# Patient Record
Sex: FEMALE | Race: BLACK OR AFRICAN AMERICAN | NOT HISPANIC OR LATINO | Employment: FULL TIME | URBAN - METROPOLITAN AREA
[De-identification: names, ages, dates, MRNs, and addresses within clinical notes are randomized per-mention and may not be internally consistent; named-entity substitution may affect disease eponyms.]

---

## 2017-12-14 DIAGNOSIS — G47.33 OSA (OBSTRUCTIVE SLEEP APNEA): ICD-10-CM

## 2017-12-14 DIAGNOSIS — E66.01 OBESITY, CLASS III, BMI 40-49.9 (MORBID OBESITY) (HCC): Primary | ICD-10-CM

## 2017-12-14 DIAGNOSIS — I10 ESSENTIAL HYPERTENSION: ICD-10-CM

## 2017-12-14 DIAGNOSIS — R53.82 CHRONIC FATIGUE: ICD-10-CM

## 2017-12-14 DIAGNOSIS — R60.9 EDEMA, UNSPECIFIED TYPE: ICD-10-CM

## 2017-12-14 PROBLEM — E66.813 OBESITY, CLASS III, BMI 40-49.9 (MORBID OBESITY): Status: ACTIVE | Noted: 2017-12-14

## 2017-12-19 ENCOUNTER — CONSULT (OUTPATIENT)
Dept: BARIATRICS/WEIGHT MGMT | Facility: CLINIC | Age: 42
End: 2017-12-19

## 2017-12-19 ENCOUNTER — APPOINTMENT (OUTPATIENT)
Dept: LAB | Facility: HOSPITAL | Age: 42
End: 2017-12-19

## 2017-12-19 VITALS
SYSTOLIC BLOOD PRESSURE: 157 MMHG | RESPIRATION RATE: 16 BRPM | WEIGHT: 293 LBS | BODY MASS INDEX: 48.82 KG/M2 | HEART RATE: 68 BPM | DIASTOLIC BLOOD PRESSURE: 97 MMHG | TEMPERATURE: 98.5 F | HEIGHT: 65 IN

## 2017-12-19 DIAGNOSIS — R10.13 DYSPEPSIA: ICD-10-CM

## 2017-12-19 DIAGNOSIS — R53.82 CHRONIC FATIGUE: ICD-10-CM

## 2017-12-19 DIAGNOSIS — G47.33 OSA (OBSTRUCTIVE SLEEP APNEA): ICD-10-CM

## 2017-12-19 DIAGNOSIS — I10 ESSENTIAL HYPERTENSION: ICD-10-CM

## 2017-12-19 DIAGNOSIS — R60.9 EDEMA, UNSPECIFIED TYPE: ICD-10-CM

## 2017-12-19 DIAGNOSIS — E66.01 MORBID OBESITY WITH BMI OF 50.0-59.9, ADULT (HCC): Primary | ICD-10-CM

## 2017-12-19 PROBLEM — E66.813 OBESITY, CLASS III, BMI 40-49.9 (MORBID OBESITY): Status: RESOLVED | Noted: 2017-12-14 | Resolved: 2017-12-19

## 2017-12-19 LAB
ALBUMIN SERPL-MCNC: 4 G/DL (ref 3.5–5.2)
ALBUMIN/GLOB SERPL: 1.2 G/DL
ALP SERPL-CCNC: 78 U/L (ref 39–117)
ALT SERPL W P-5'-P-CCNC: 15 U/L (ref 1–33)
ANION GAP SERPL CALCULATED.3IONS-SCNC: 12.9 MMOL/L
AST SERPL-CCNC: 12 U/L (ref 1–32)
BASOPHILS # BLD AUTO: 0.06 10*3/MM3 (ref 0–0.2)
BASOPHILS NFR BLD AUTO: 0.6 % (ref 0–1.5)
BILIRUB SERPL-MCNC: 0.4 MG/DL (ref 0.1–1.2)
BUN BLD-MCNC: 18 MG/DL (ref 6–20)
BUN/CREAT SERPL: 15.4 (ref 7–25)
CALCIUM SPEC-SCNC: 9.3 MG/DL (ref 8.6–10.5)
CHLORIDE SERPL-SCNC: 101 MMOL/L (ref 98–107)
CHOLEST SERPL-MCNC: 150 MG/DL (ref 0–200)
CO2 SERPL-SCNC: 27.1 MMOL/L (ref 22–29)
CREAT BLD-MCNC: 1.17 MG/DL (ref 0.57–1)
DEPRECATED RDW RBC AUTO: 45.7 FL (ref 37–54)
EOSINOPHIL # BLD AUTO: 0.24 10*3/MM3 (ref 0–0.7)
EOSINOPHIL NFR BLD AUTO: 2.6 % (ref 0.3–6.2)
ERYTHROCYTE [DISTWIDTH] IN BLOOD BY AUTOMATED COUNT: 14.8 % (ref 11.7–13)
GFR SERPL CREATININE-BSD FRML MDRD: 61 ML/MIN/1.73
GLOBULIN UR ELPH-MCNC: 3.3 GM/DL
GLUCOSE BLD-MCNC: 117 MG/DL (ref 65–99)
HBA1C MFR BLD: 6.4 % (ref 4.8–5.6)
HCT VFR BLD AUTO: 37.6 % (ref 35.6–45.5)
HDLC SERPL-MCNC: 35 MG/DL (ref 40–60)
HGB BLD-MCNC: 12.6 G/DL (ref 11.9–15.5)
IMM GRANULOCYTES # BLD: 0.04 10*3/MM3 (ref 0–0.03)
IMM GRANULOCYTES NFR BLD: 0.4 % (ref 0–0.5)
LDLC SERPL CALC-MCNC: 92 MG/DL (ref 0–100)
LDLC/HDLC SERPL: 2.63 {RATIO}
LYMPHOCYTES # BLD AUTO: 3.48 10*3/MM3 (ref 0.9–4.8)
LYMPHOCYTES NFR BLD AUTO: 37.5 % (ref 19.6–45.3)
MCH RBC QN AUTO: 28.1 PG (ref 26.9–32)
MCHC RBC AUTO-ENTMCNC: 33.5 G/DL (ref 32.4–36.3)
MCV RBC AUTO: 83.9 FL (ref 80.5–98.2)
MONOCYTES # BLD AUTO: 0.5 10*3/MM3 (ref 0.2–1.2)
MONOCYTES NFR BLD AUTO: 5.4 % (ref 5–12)
NEUTROPHILS # BLD AUTO: 4.96 10*3/MM3 (ref 1.9–8.1)
NEUTROPHILS NFR BLD AUTO: 53.5 % (ref 42.7–76)
PLATELET # BLD AUTO: 410 10*3/MM3 (ref 140–500)
PMV BLD AUTO: 10.3 FL (ref 6–12)
POTASSIUM BLD-SCNC: 4.7 MMOL/L (ref 3.5–5.2)
PROT SERPL-MCNC: 7.3 G/DL (ref 6–8.5)
RBC # BLD AUTO: 4.48 10*6/MM3 (ref 3.9–5.2)
SODIUM BLD-SCNC: 141 MMOL/L (ref 136–145)
TRIGL SERPL-MCNC: 114 MG/DL (ref 0–150)
TSH SERPL DL<=0.05 MIU/L-ACNC: 0.92 MIU/ML (ref 0.27–4.2)
VLDLC SERPL-MCNC: 22.8 MG/DL (ref 5–40)
WBC NRBC COR # BLD: 9.28 10*3/MM3 (ref 4.5–10.7)

## 2017-12-19 PROCEDURE — 83036 HEMOGLOBIN GLYCOSYLATED A1C: CPT | Performed by: NURSE PRACTITIONER

## 2017-12-19 PROCEDURE — 85025 COMPLETE CBC W/AUTO DIFF WBC: CPT | Performed by: NURSE PRACTITIONER

## 2017-12-19 PROCEDURE — 94690 O2 UPTK REST INDIRECT: CPT | Performed by: NURSE PRACTITIONER

## 2017-12-19 PROCEDURE — 99205 OFFICE O/P NEW HI 60 MIN: CPT | Performed by: NURSE PRACTITIONER

## 2017-12-19 PROCEDURE — 36415 COLL VENOUS BLD VENIPUNCTURE: CPT | Performed by: NURSE PRACTITIONER

## 2017-12-19 PROCEDURE — 80061 LIPID PANEL: CPT | Performed by: NURSE PRACTITIONER

## 2017-12-19 PROCEDURE — 80053 COMPREHEN METABOLIC PANEL: CPT | Performed by: NURSE PRACTITIONER

## 2017-12-19 PROCEDURE — 84443 ASSAY THYROID STIM HORMONE: CPT | Performed by: NURSE PRACTITIONER

## 2017-12-19 RX ORDER — HYDRALAZINE HYDROCHLORIDE 10 MG/1
TABLET, FILM COATED ORAL
Refills: 5 | COMMUNITY
Start: 2017-11-15 | End: 2018-04-11

## 2017-12-19 NOTE — PROGRESS NOTES
[facility]AT[facility] BARIATRIC SURGERY  [address]  [facility] [id number] [phone]  [address]  [facility] [address] [phone]  Dept: [phone]  12/19/2017      Shruthi Dockery.  [id number]  [id number]  1975  female      Chief Complaint of weight gain; unable to maintain weight loss    History of Present Illness:   Shruthi is a 42 y.o. female who presents today for evaluation, education and consultation regarding weight loss surgery. The patient is interested in the sleeve gastrectomy.      Diet History:Shruthi has been overweight for at least 15 years, has been 35 pounds or more overweight for at least 15 years, has been 100 pounds or more overweight for 15 or more years and started dieting at age 28.  The most weight Shruthi lost was 50 pounds on WW and maintained the weight loss for 6 months. Shruthi describes her eating habits as snacker and sweets eater. Shruthi Overall has tried Weight Watchers, Fasting and medications among others with success of losing up to 50 pounds, but in each instance regained the weight.   See dietician documentation for complete history.    Bariatric Surgery Evaluation: The patient is being seen for an initial visit for bariatric surgery evaluation.     Bariatric Co-morbidities:  sleep apnea, hypertension and edema    Patient Active Problem List   Diagnosis   • Chronic fatigue   • Essential hypertension   • Edema   • TERRI (obstructive sleep apnea)   • Morbid obesity with BMI of 50.0-59.9, adult   • Dyspepsia       Past Medical History:   Diagnosis Date   • Hemorrhoids    • HTN (hypertension)    • TERRI (obstructive sleep apnea)    • Weight gain        Past Surgical History:   Procedure Laterality Date   • TUBAL ABDOMINAL LIGATION     • VAGINAL HYSTERECTOMY         Allergies   Allergen Reactions   • Penicillins    • Skelaxin [Metaxalone]    • Sulfa Antibiotics          Current Outpatient Prescriptions:   •   Cholecalciferol (VITAMIN D) 2000 units capsule, Take  by mouth., Disp: , Rfl:   •  hydrALAZINE (APRESOLINE) 10 MG tablet, TAKE ONE TABLET BY MOUTH THREE TIMES A DAY, Disp: , Rfl: 5  •  nebivolol (BYSTOLIC) 20 MG tablet, Take 20 mg by mouth Daily., Disp: , Rfl:   •  spironolactone (ALDACTONE) 25 MG tablet, Take 25 mg by mouth Daily., Disp: , Rfl:     Social History     Social History   • Marital status:      Spouse name: N/A   • Number of children: 2   • Years of education: N/A     Occupational History   • Not on file.     Social History Main Topics   • Smoking status: Never Smoker   • Smokeless tobacco: Never Used   • Alcohol use No   • Drug use: No   • Sexual activity: Defer     Other Topics Concern   • Not on file     Social History Narrative       Family History   Problem Relation Age of Onset   • Obesity Mother    • Hypertension Mother    • Stroke Mother    • Coronary artery disease Mother    • Sleep apnea Mother    • Hypertension Sister    • Hypertension Maternal Grandfather          Review of Systems:  Review of Systems   All other systems reviewed and are negative.      Physical Exam:  Vital Signs:  Weight: (!) 138 kg (304 lb)   Body mass index is 50.59 kg/(m^2).  Temp: 98.5 °F (36.9 °C)   Heart Rate: 68   BP: 157/97     Physical Exam   Constitutional: She is oriented to person, place, and time. She appears well-developed and well-nourished.   HENT:   Head: Normocephalic and atraumatic.   Eyes: EOM are normal.   Cardiovascular: Normal rate, regular rhythm and normal heart sounds.    Pulmonary/Chest: Effort normal and breath sounds normal.   Abdominal: Soft. Bowel sounds are normal. She exhibits no distension. There is no tenderness.   Musculoskeletal: Normal range of motion.   Neurological: She is alert and oriented to person, place, and time.   Skin: Skin is warm and dry.   Psychiatric: She has a normal mood and affect. Her behavior is normal. Judgment and thought content normal.   Vitals  reviewed.         Assessment:         Shruthi Dockery is a 42 y.o. year old female with medically complicated severe obesity. Weight: (!) 138 kg (304 lb), Body mass index is 50.59 kg/(m^2). and weight related problems including sleep apnea, hypertension and edema.    I explained in detail the procedures that we are performing.  All of those procedures can be performed laparoscopically but there is a chance to convert to open if any technical challenges or complications do occur.  Bariatric surgery is not cosmetic surgery but rather a tool to help a patient make a life-long commitment lifestyle changes including diet, exercise, behavior changes, and taking supplemental vitamins and minerals.    Due to the patient's BMI and co-morbidities they are at a high risk for surgery and will obtain the following:  The patient has been advised that a letter of medical support and a history and physical must be obtained from her primary care physician. A psychological evaluation will be arranged for this patient. CBC, CMP, FLP, TSH and HgbA1C will be drawn. Shruthi Dockery will obtain a pre-operative CXR and EKG.     Shruthi Dockery will be set up for a pre-operative diagnostic esophagogastroduodenoscopy with biopsy for evaluation. The risks and benefits of the procedure were discussed with the patient in detail and all questions were answered.  Possibility of perforation, bleeding, aspiration, anoxic brain injury, respiratory and/or cardiac arrest and death were discussed.   She received handouts regarding, all questions were answered and informed consent was obtained.     The risks, benefits, alternatives, and potential complications of all of the procedures were explained in detail including, but not limited to death, anesthesia and medication adverse effect/DVT, pulmonary embolism, trocar site/incisional hernia, wound infection, abdominal infection, bleeding, failure to lose weight or gain weight and change in body image,  metabolic complications with calcium, thiamine, vitamin B12, folate, iron, and anemia.    The patient was advised to start a high protein, low fat and low carbohydrate diet. The patient was given individualized information by our dietician along with general group information and handouts.     The patient had the Basal Metabolic Rate test performed in our office today then I reviewed the results with them including changes to help increase metabolism and a recommended daily caloric intake range- see scanned results.     The patient was given information regarding the LAI educational video. LAI is an internet based educational video which explains the surgical procedure and answers basic questions regarding the procedure. The patient was provided with instructions and a password to watch the video.    The patient was encouraged to start routine exercise including but not limited to 150 minutes per week. The patient received a resistance band along with a handout of exercises.     The consultation plan was reviewed with the patient.    The patient understands the surgical procedures and the different surgical options that are available.  She understands the lifestyle changes that would be required after surgery and has agreed to participate in a pre-operative and postoperative weight management program.  She also expressed understanding of possible risks, had several questions answered and desires to proceed.    I think she is a good candidate for this surgery, and is interested in a sleeve gastrectomy.    Encounter Diagnoses   Name Primary?   • Morbid obesity with BMI of 50.0-59.9, adult Yes   • Essential hypertension    • Edema, unspecified type    • TERRI (obstructive sleep apnea)    • Dyspepsia    • Chronic fatigue        Plan:    Patient will have evaluations and follow up with bariatric dieticians and a psychologist before undergoing a multidisciplinary review of her candidacy.  We also discussed the weight loss  requirement and rationale, and other program requirements.      Adelaida Sierra, APRN  12/19/2017

## 2017-12-20 ENCOUNTER — TELEPHONE (OUTPATIENT)
Dept: BARIATRICS/WEIGHT MGMT | Facility: CLINIC | Age: 42
End: 2017-12-20

## 2018-01-24 ENCOUNTER — OUTSIDE FACILITY SERVICE (OUTPATIENT)
Dept: BARIATRICS/WEIGHT MGMT | Facility: CLINIC | Age: 43
End: 2018-01-24

## 2018-01-24 ENCOUNTER — LAB REQUISITION (OUTPATIENT)
Dept: LAB | Facility: HOSPITAL | Age: 43
End: 2018-01-24

## 2018-01-24 DIAGNOSIS — Z00.00 ENCOUNTER FOR GENERAL ADULT MEDICAL EXAMINATION WITHOUT ABNORMAL FINDINGS: ICD-10-CM

## 2018-01-24 PROCEDURE — 87081 CULTURE SCREEN ONLY: CPT | Performed by: SURGERY

## 2018-01-24 PROCEDURE — 43239 EGD BIOPSY SINGLE/MULTIPLE: CPT | Performed by: SURGERY

## 2018-01-25 ENCOUNTER — TELEPHONE (OUTPATIENT)
Dept: BARIATRICS/WEIGHT MGMT | Facility: CLINIC | Age: 43
End: 2018-01-25

## 2018-01-25 LAB — UREASE TISS QL: NEGATIVE

## 2018-01-25 NOTE — TELEPHONE ENCOUNTER
----- Message from Christ Sunshine Jr., MD sent at 1/25/2018 11:07 AM EST -----  Please call the patient regarding her abnormal result and if abnormal treat per protocol. Make sure she has follow up appointment.

## 2018-03-23 ENCOUNTER — HOSPITAL ENCOUNTER (OUTPATIENT)
Dept: GENERAL RADIOLOGY | Facility: HOSPITAL | Age: 43
Discharge: HOME | End: 2018-03-23

## 2018-03-23 ENCOUNTER — HOSPITAL ENCOUNTER (OUTPATIENT)
Dept: CARDIOLOGY | Facility: HOSPITAL | Age: 43
Discharge: HOME | End: 2018-03-23
Admitting: NURSE PRACTITIONER

## 2018-03-23 DIAGNOSIS — I10 ESSENTIAL HYPERTENSION: ICD-10-CM

## 2018-03-23 DIAGNOSIS — G47.33 OSA (OBSTRUCTIVE SLEEP APNEA): ICD-10-CM

## 2018-03-23 DIAGNOSIS — R53.82 CHRONIC FATIGUE: ICD-10-CM

## 2018-03-23 DIAGNOSIS — R60.9 EDEMA, UNSPECIFIED TYPE: ICD-10-CM

## 2018-03-23 DIAGNOSIS — R10.13 DYSPEPSIA: ICD-10-CM

## 2018-03-23 DIAGNOSIS — E66.01 MORBID OBESITY WITH BMI OF 50.0-59.9, ADULT (HCC): ICD-10-CM

## 2018-03-23 PROCEDURE — 93010 ELECTROCARDIOGRAM REPORT: CPT | Performed by: INTERNAL MEDICINE

## 2018-03-23 PROCEDURE — 71046 X-RAY EXAM CHEST 2 VIEWS: CPT

## 2018-03-23 PROCEDURE — 93005 ELECTROCARDIOGRAM TRACING: CPT | Performed by: NURSE PRACTITIONER

## 2018-04-02 ENCOUNTER — TRANSCRIBE ORDERS (OUTPATIENT)
Dept: ADMINISTRATIVE | Facility: HOSPITAL | Age: 43
End: 2018-04-02

## 2018-04-02 DIAGNOSIS — H47.10 OPTIC DISC EDEMA: Primary | ICD-10-CM

## 2018-04-04 ENCOUNTER — PREP FOR SURGERY (OUTPATIENT)
Dept: OTHER | Facility: HOSPITAL | Age: 43
End: 2018-04-04

## 2018-04-04 RX ORDER — METOCLOPRAMIDE HYDROCHLORIDE 5 MG/ML
10 INJECTION INTRAMUSCULAR; INTRAVENOUS ONCE
Status: CANCELLED | OUTPATIENT
Start: 2018-04-18 | End: 2018-04-18

## 2018-04-04 RX ORDER — ACETAMINOPHEN 160 MG/5ML
975 SOLUTION ORAL ONCE
Status: CANCELLED | OUTPATIENT
Start: 2018-04-18 | End: 2018-04-18

## 2018-04-04 RX ORDER — SODIUM CHLORIDE 0.9 % (FLUSH) 0.9 %
1-10 SYRINGE (ML) INJECTION AS NEEDED
Status: CANCELLED | OUTPATIENT
Start: 2018-04-18

## 2018-04-04 RX ORDER — CHLORHEXIDINE GLUCONATE 0.12 MG/ML
15 RINSE ORAL SEE ADMIN INSTRUCTIONS
Status: CANCELLED | OUTPATIENT
Start: 2018-04-18

## 2018-04-04 RX ORDER — VANCOMYCIN HYDROCHLORIDE 1 G/200ML
1000 INJECTION, SOLUTION INTRAVENOUS
Status: CANCELLED | OUTPATIENT
Start: 2018-04-18

## 2018-04-04 RX ORDER — SODIUM CHLORIDE, SODIUM LACTATE, POTASSIUM CHLORIDE, CALCIUM CHLORIDE 600; 310; 30; 20 MG/100ML; MG/100ML; MG/100ML; MG/100ML
100 INJECTION, SOLUTION INTRAVENOUS CONTINUOUS
Status: CANCELLED | OUTPATIENT
Start: 2018-04-18

## 2018-04-04 RX ORDER — FAMOTIDINE 10 MG/ML
20 INJECTION, SOLUTION INTRAVENOUS ONCE
Status: CANCELLED | OUTPATIENT
Start: 2018-04-18 | End: 2018-04-18

## 2018-04-04 RX ORDER — SCOLOPAMINE TRANSDERMAL SYSTEM 1 MG/1
1 PATCH, EXTENDED RELEASE TRANSDERMAL ONCE
Status: CANCELLED | OUTPATIENT
Start: 2018-04-18 | End: 2018-04-18

## 2018-04-05 ENCOUNTER — CONSULT (OUTPATIENT)
Dept: BARIATRICS/WEIGHT MGMT | Facility: CLINIC | Age: 43
End: 2018-04-05

## 2018-04-05 VITALS
HEART RATE: 73 BPM | HEIGHT: 65 IN | DIASTOLIC BLOOD PRESSURE: 81 MMHG | RESPIRATION RATE: 16 BRPM | WEIGHT: 293 LBS | SYSTOLIC BLOOD PRESSURE: 155 MMHG | BODY MASS INDEX: 48.82 KG/M2 | TEMPERATURE: 98.6 F

## 2018-04-05 DIAGNOSIS — E66.01 MORBID OBESITY WITH BMI OF 50.0-59.9, ADULT (HCC): Primary | ICD-10-CM

## 2018-04-05 DIAGNOSIS — G47.33 OSA (OBSTRUCTIVE SLEEP APNEA): ICD-10-CM

## 2018-04-05 DIAGNOSIS — R10.13 DYSPEPSIA: ICD-10-CM

## 2018-04-05 DIAGNOSIS — R53.82 CHRONIC FATIGUE: ICD-10-CM

## 2018-04-05 DIAGNOSIS — R60.0 LOCALIZED EDEMA: ICD-10-CM

## 2018-04-05 DIAGNOSIS — I10 ESSENTIAL HYPERTENSION: ICD-10-CM

## 2018-04-05 PROCEDURE — 99215 OFFICE O/P EST HI 40 MIN: CPT | Performed by: SURGERY

## 2018-04-05 RX ORDER — URSODIOL 300 MG/1
300 CAPSULE ORAL 2 TIMES DAILY
Qty: 180 CAPSULE | Refills: 1 | Status: SHIPPED | OUTPATIENT
Start: 2018-04-05 | End: 2018-10-17

## 2018-04-05 NOTE — H&P
Bariatric Consult:  Referred by José Miguel Truong MD    Shruthi Dockery is here today for consult on Consult (Consult GS)      History of Present Illness:     Shruthi Dockery is a 42 y.o. female with morbid obesity with co-morbidities including sleep apnea, hypertension, back pain, knee pain and edema who presents for surgical consultation for the above procedure. Shruthi has completed the initial intake visit and has been examined by our nurse practitioner, dietician, psychologist and underwent the extensive educational teaching process under the guidance of our bariatric coordinator and myself. Shruthi also has seen the educational video LAI on the surgical procedure if available. Shruthi attended today more educational teaching from our bariatric coordinator and myself. Shruthi has had an extensive medical workup including a visit with their primary care physician, EKG, chest radiograph, blood work, EGD or UGI and possibly further testing. These have been reviewed by me and discussed with the patient. Shruthi is now ready to proceed with surgery. Shruthi presently denies nausea, vomiting, fever, chills, chest pain, shortness of air, melena, hematochezia, hemetemesis, dysuria, frequency, hematuria, jaundice or abdominal pain.       Past Medical History:   Diagnosis Date   • Hemorrhoids    • HTN (hypertension)    • TERRI (obstructive sleep apnea)    • Weight gain        Encounter Diagnoses   Name Primary?   • Morbid obesity with BMI of 50.0-59.9, adult Yes   • Chronic fatigue    • Essential hypertension    • Localized edema    • TERRI (obstructive sleep apnea)    • Dyspepsia        Past Surgical History:   Procedure Laterality Date   • TUBAL ABDOMINAL LIGATION     • VAGINAL HYSTERECTOMY         Patient Active Problem List   Diagnosis   • Chronic fatigue   • Essential hypertension   • Edema   • TERRI (obstructive sleep apnea)   • Morbid obesity with BMI of 50.0-59.9, adult   • Dyspepsia       Allergies   Allergen  Reactions   • Penicillins    • Skelaxin [Metaxalone]    • Sulfa Antibiotics          Current Outpatient Prescriptions:   •  Cholecalciferol (VITAMIN D) 2000 units capsule, Take  by mouth., Disp: , Rfl:   •  hydrALAZINE (APRESOLINE) 10 MG tablet, TAKE ONE TABLET BY MOUTH THREE TIMES A DAY, Disp: , Rfl: 5  •  nebivolol (BYSTOLIC) 20 MG tablet, Take 20 mg by mouth Daily., Disp: , Rfl:   •  spironolactone (ALDACTONE) 25 MG tablet, Take 25 mg by mouth Daily., Disp: , Rfl:   •  folic acid-pyridoxine-cyanocobalamin (FOLBIC) 2.5-25-2 MG tablet tablet, Take 1 tablet by mouth Daily. Begin two weeks before surgery, Disp: 40 each, Rfl: 0  •  ursodiol (ACTIGALL) 300 MG capsule, Take 1 capsule by mouth 2 (Two) Times a Day. BEGIN TWO WEEKS BEFORE SURGERY, Disp: 180 capsule, Rfl: 1    Social History     Social History   • Marital status:      Spouse name: N/A   • Number of children: 2   • Years of education: N/A     Occupational History   • Not on file.     Social History Main Topics   • Smoking status: Never Smoker   • Smokeless tobacco: Never Used   • Alcohol use No   • Drug use: No   • Sexual activity: Defer     Other Topics Concern   • Not on file     Social History Narrative   • No narrative on file       Family History   Problem Relation Age of Onset   • Obesity Mother    • Hypertension Mother    • Stroke Mother    • Coronary artery disease Mother    • Sleep apnea Mother    • Hypertension Sister    • Hypertension Maternal Grandfather        Review of Systems:  Review of Systems   Constitutional: Positive for fatigue.   Musculoskeletal: Positive for arthralgias and back pain.   All other systems reviewed and are negative.        Physical Exam:    Vital Signs:  Weight: 136 kg (300 lb)   Body mass index is 49.92 kg/m².  Temp: 98.6 °F (37 °C)   Heart Rate: 73   BP: 155/81       Physical Exam   Constitutional: She is oriented to person, place, and time. She appears well-nourished.   HENT:   Head: Normocephalic and  atraumatic.   Mouth/Throat: Oropharynx is clear and moist.   Eyes: Conjunctivae and EOM are normal. Pupils are equal, round, and reactive to light. No scleral icterus.   Neck: Normal range of motion. Neck supple. No thyromegaly present.   Cardiovascular: Normal rate and regular rhythm.    Pulmonary/Chest: Effort normal and breath sounds normal.   Abdominal: Soft. Bowel sounds are normal. She exhibits no distension and no mass. There is no tenderness. There is no rebound and no guarding. No hernia.   Musculoskeletal: Normal range of motion. She exhibits edema.   Lymphadenopathy:     She has no cervical adenopathy.   Neurological: She is alert and oriented to person, place, and time. No cranial nerve deficit. Coordination normal.   Skin: Skin is warm and dry. No erythema.   Psychiatric: She has a normal mood and affect. Her behavior is normal.   Vitals reviewed.        Assessment:    Shruthi Dockery is a 42 y.o. year old female with medically complicated severe obesity with a BMI of Body mass index is 49.92 kg/m². and multiple co-morbidities listed in the encounter diagnosis.    I think she is an appropriate candidate for this surgery, and is ready to proceed.      Plan/Discussion/Summary:  No hiatal hernia per me.  No PPI.  Helicobacter pylori negative    The patient has returned to the office for a surgical consultation and has requested to proceed with a laparoscopic gastric sleeve.  I have had the opportunity to obtain a history, examine the patient and review the patient's chart.    The patient understands that surgery is a tool and that weight loss is not guaranteed but only seen in the context of appropriate use, regular follow up, exercise and making appropriate food choices.     I personally discussed the potential complications of the laparoscopic gastric sleeve with this patient.  The patient is well aware of potential complications of the surgery that include but not limited to bleeding, infections, deep  vein thrombosis, pulmonary embolism, pulmonary complications such as pneumonia, cardiac event, hernias, small bowel obstruction, damage to the spleen or other organs, bowel injury, disfiguring scars, failure to lose weight, need for additional surgery, conversion to an open procedure and death.  The patient is also aware of complications which apply in particular to the gastric sleeve and can include but not limited to the leakage of gastric contents at the staple line, the development of an intra-abdominal abscess, gastroesophageal reflux disease, Rivas's esophagus, ulcers, vitamin/mineral deficiencies, strictures, and the possibility of converting this procedure to a Manny-en-Y gastric bypass. The patient also understands the possibility of requiring an acid reducer medication for the rest of their life.    The risks, benefits, potential complications and alternative therapies were discussed at great length as outlined in our extensive consent forms, online consent and educational teaching processes.    The patient has confirmed the participation in the programs extensive educational activities.    All questions and concerns were answered to patient's satisfaction.  The patient now wishes to proceed with surgery.    Patient has declined the pre-operative insertion of an IVC filter.     The patient has declined a postoperative course of anitcoagulant therapy.        I instructed patient to start on a H2 blocker or proton pump inhibitor if not already on one of these medications.    I explained in detail the procedures that we perform.  All of these procedures have a chance to convert to open if any technical challenges or complications do occur.  Bariatric surgery is not cosmetic surgery but rather a tool to help a patient make a life-long commitment lifestyle change including diet, exercise, behavior changes, and taking supplemental vitamins and minerals.    Problems after surgery may require more operations to  correct them.    The risks, benefits, alternatives, and potential complications of all of the procedures were explained in detail including, but not limited to death, anesthesia and medication adverse effect, deep venous thrombosis, pulmonary embolism, trocar site/incisional hernia, wound infection, abdominal infection, bleeding, failure to lose weight, gain weight, a change in body image, metabolic complications with vitamin deficiences and anemia.    Weight loss expectations were discussed with the patient in detail. The weight loss operations most commonly performed are the sleeve gastrectomy and the Manny-en-Y gastric bypass. These operations result in weight losses up to approximately 25-35% of initial body weight 12 to 24 months after surgery with the gastric bypass usually the higher percent of weight loss but depends on patient using the tool.    For the gastric bypass and loop duodenal switch (SETH-S) the risks include but not limited to the following early complications:  Anastomotic leak/peritonitis, Manny/Alimentary/biliopancreatic limb obstruction, severe & minor wound infection/seroma, and nausea/vomiting.  Late complications can include but are not limited to malnutrition, vitamin deficiencies, frequent loose stools,  stomal stenosis, marginal ulcer, bowel obstruction, intussusception, internal, and incisional hernia.    Regarding the gastric sleeve, there is less long-term outcome data and higher risk of dysphagia and reflux compared to a gastric bypass, as well as risk of internal visceral/organ injury, splenectomy, bleeding, infection, leak (which could require further intervention possible conversion to Manny-en-Y gastric bypass), stenosis and possibility of regaining weight.    Shruthi was counseled regarding diagnostic results, instructions for management, risk factor reductions, prognosis, patient and family education, impressions, risks and benefits of treatment options and importance of  compliance with treatment. Total face to face time of the encounter was over 45 minutes and over 30 minutes was spent counseling.     [place] Report   As part of this patient's treatment plan I am prescribing controlled substances. The patient has been made aware of appropriate use of such medications, including potential risk of somnolence, limited ability to drive and /or work safely, and potential for dependence or overdose. It has also been made clear that these medications are for use by this patient only, without concomitant use of alcohol or other substances unless prescribed.    Shruthi has completed prescribing agreement detailing terms of continued prescribing of controlled substances, including monitoring APOLLO reports, urine drug screening, and pill counts if necessary. Shruthi is aware that inappropriate use will result in cessation of prescribing such medications.    APOLLO report has been reviewed      History and physical exam exhibit continued safe and appropriate use of controlled substances.      Shruthi understands the surgical procedures and the different surgical options that are available.  She understands the lifestyle changes that are required after surgery and has agreed to follow the guidelines outlined in the weight management program.  She also expressed understanding of the risks involved and had all of female questions answered and desires to proceed.      Christ Sunshine MD  4/5/2018

## 2018-04-05 NOTE — PATIENT INSTRUCTIONS
Bariatric Manual    You were provided a manual specific to the procedure that you have chosen.  Please refer to that with any questions or call the office at [phone]

## 2018-04-09 ENCOUNTER — APPOINTMENT (OUTPATIENT)
Dept: MRI IMAGING | Facility: HOSPITAL | Age: 43
End: 2018-04-09

## 2018-04-11 ENCOUNTER — APPOINTMENT (OUTPATIENT)
Dept: PREADMISSION TESTING | Facility: HOSPITAL | Age: 43
End: 2018-04-11

## 2018-04-11 VITALS
HEART RATE: 64 BPM | DIASTOLIC BLOOD PRESSURE: 82 MMHG | TEMPERATURE: 98.1 F | SYSTOLIC BLOOD PRESSURE: 125 MMHG | HEIGHT: 65 IN | RESPIRATION RATE: 16 BRPM | OXYGEN SATURATION: 100 %

## 2018-04-11 LAB
ALBUMIN SERPL-MCNC: 4.2 G/DL (ref 3.5–5.2)
ALBUMIN/GLOB SERPL: 1.4 G/DL
ALP SERPL-CCNC: 64 U/L (ref 39–117)
ALT SERPL W P-5'-P-CCNC: 19 U/L (ref 1–33)
ANION GAP SERPL CALCULATED.3IONS-SCNC: 12.2 MMOL/L
AST SERPL-CCNC: 12 U/L (ref 1–32)
BILIRUB SERPL-MCNC: 0.3 MG/DL (ref 0.1–1.2)
BUN BLD-MCNC: 26 MG/DL (ref 6–20)
BUN/CREAT SERPL: 23.9 (ref 7–25)
CALCIUM SPEC-SCNC: 9.6 MG/DL (ref 8.6–10.5)
CHLORIDE SERPL-SCNC: 102 MMOL/L (ref 98–107)
CO2 SERPL-SCNC: 26.8 MMOL/L (ref 22–29)
CREAT BLD-MCNC: 1.09 MG/DL (ref 0.57–1)
DEPRECATED RDW RBC AUTO: 43.4 FL (ref 37–54)
ERYTHROCYTE [DISTWIDTH] IN BLOOD BY AUTOMATED COUNT: 13.8 % (ref 11.7–13)
GFR SERPL CREATININE-BSD FRML MDRD: 66 ML/MIN/1.73
GLOBULIN UR ELPH-MCNC: 3 GM/DL
GLUCOSE BLD-MCNC: 95 MG/DL (ref 65–99)
HCT VFR BLD AUTO: 39.4 % (ref 35.6–45.5)
HGB BLD-MCNC: 12.8 G/DL (ref 11.9–15.5)
MCH RBC QN AUTO: 27.9 PG (ref 26.9–32)
MCHC RBC AUTO-ENTMCNC: 32.5 G/DL (ref 32.4–36.3)
MCV RBC AUTO: 86 FL (ref 80.5–98.2)
PLATELET # BLD AUTO: 369 10*3/MM3 (ref 140–500)
PMV BLD AUTO: 10.1 FL (ref 6–12)
POTASSIUM BLD-SCNC: 4.9 MMOL/L (ref 3.5–5.2)
PROT SERPL-MCNC: 7.2 G/DL (ref 6–8.5)
RBC # BLD AUTO: 4.58 10*6/MM3 (ref 3.9–5.2)
SODIUM BLD-SCNC: 141 MMOL/L (ref 136–145)
WBC NRBC COR # BLD: 8.73 10*3/MM3 (ref 4.5–10.7)

## 2018-04-11 PROCEDURE — 85027 COMPLETE CBC AUTOMATED: CPT | Performed by: SURGERY

## 2018-04-11 PROCEDURE — 80053 COMPREHEN METABOLIC PANEL: CPT | Performed by: SURGERY

## 2018-04-11 PROCEDURE — 36415 COLL VENOUS BLD VENIPUNCTURE: CPT

## 2018-04-11 RX ORDER — HYDRALAZINE HYDROCHLORIDE 10 MG/1
10 TABLET, FILM COATED ORAL 3 TIMES DAILY
COMMUNITY
End: 2019-10-03

## 2018-04-11 NOTE — DISCHARGE INSTRUCTIONS
Take the following medications the morning of surgery with a small sip of water:  BYSTOLIC, HYDRALAZINE    General Instructions:   • You may have up to 20 oz of clear-artificially sweetened liquid (to include Powerade Zero, Water, Tea/Coffee with no cream or milk added).  Nothing red in color.  Drink must be completed 4 hours before the start of your surgery- nothing to drink within 4 hours of surgery.    • Patients who avoid smoking, chewing tobacco and alcohol for 4 weeks prior to surgery have a reduced risk of post-operative complications.  Quit smoking as many days before surgery as you can.  • Do not smoke, use chewing tobacco or drink alcohol the day of surgery.   • If applicable bring your C-PAP/ BI-PAP machine.  • Bring any papers given to you in the doctor’s office.  • Wear clean comfortable clothes and socks.  • Do not wear contact lenses or make-up.  Bring a case for your glasses.   • Bring crutches or walker if applicable.  • Remove all piercings.  Leave jewelry and any other valuables at home.  • Hair extensions with metal clips must be removed prior to surgery.  • The Pre-Admission Testing nurse will instruct you to bring medications if unable to obtain an accurate list in Pre-Admission Testing.        If you were given a blood bank ID arm band remember to bring it with you the day of surgery.    Preventing a Surgical Site Infection:  • For 2 to 3 days before surgery, avoid shaving with a razor because the razor can irritate skin and make it easier to develop an infection. Take a regular shower using a fresh bar of anti-bacterial soap (such as Dial) and clean washcloth followed by Hibiclens.  • The day before surgery take a regular shower using a fresh bar of anti-bacterial soap (such as Dial) and clean washcloth followed by Hibiclens.  Then utilize one of the cloths given to you in PAT.    • The night prior to surgery sleep in a clean bed with clean clothing.  Do not allow pets to sleep with  you.  • The morning of surgery take a shower, rinse with Hibiclens and then utilize the 2nd cloth given to you in PAT.  Dry with a clean towel and dress in clean clothing.  • Do not use any cologne, deodorant or powder morning of surgery.    • Ask your surgeon if you will be receiving antibiotics prior to surgery.  • Make sure you, your family, and all healthcare providers clean their hands with soap and water or an alcohol based hand  before caring for you or your wound.    Day of surgery:  Upon arrival, a Pre-op nurse and Anesthesiologist will review your health history, obtain vital signs, and answer questions you may have.  The only belongings needed at this time will be your home medications and if applicable your C-PAP/BI-PAP machine.  If you are staying overnight your family can leave the rest of your belongings in the car and bring them to your room later.  A Pre-op nurse will start an IV and you may receive medication in preparation for surgery, including something to help you relax.  Your family will be able to see you in the Pre-op area.  While you are in surgery your family should notify the waiting room  if they leave the waiting room area and provide a contact phone number.    Please be aware that surgery does come with discomfort.  We want to make every effort to control your discomfort so please discuss any uncontrolled symptoms with your nurse.   Your doctor will most likely have prescribed pain medications.      If you are going home after surgery you will receive individualized written care instructions before being discharged.  A responsible adult must drive you to and from the hospital on the day of your surgery and stay with you for 24 hours.    If you are staying overnight following surgery, you will be transported to your hospital room following the recovery period.  [facility] has all private rooms.    If you have any questions please call Pre-Admission  Testing at [phone].  Deductibles and co-payments are collected on the day of service. Please be prepared to pay the required co-pay, deductible or deposit on the day of service as defined by your plan.

## 2018-04-12 ENCOUNTER — TELEPHONE (OUTPATIENT)
Dept: BARIATRICS/WEIGHT MGMT | Facility: CLINIC | Age: 43
End: 2018-04-12

## 2018-04-12 NOTE — TELEPHONE ENCOUNTER
----- Message from Christ Sunshine Jr., MD sent at 4/11/2018  4:54 PM EDT -----  I left patient a voicemail and instructed her to increase her fluid intake regarding her elevated BUN and creatinine.  I instructed her to call the office if she has any questions.

## 2018-04-18 ENCOUNTER — ANESTHESIA EVENT (OUTPATIENT)
Dept: PERIOP | Facility: HOSPITAL | Age: 43
End: 2018-04-18

## 2018-04-18 ENCOUNTER — HOSPITAL ENCOUNTER (INPATIENT)
Facility: HOSPITAL | Age: 43
LOS: 1 days | Discharge: HOME | End: 2018-04-19
Attending: SURGERY | Admitting: SURGERY

## 2018-04-18 ENCOUNTER — ANESTHESIA (OUTPATIENT)
Dept: PERIOP | Facility: HOSPITAL | Age: 43
End: 2018-04-18

## 2018-04-18 LAB
ANION GAP SERPL CALCULATED.3IONS-SCNC: 11.6 MMOL/L
BUN BLD-MCNC: 23 MG/DL (ref 6–20)
BUN/CREAT SERPL: 18.7 (ref 7–25)
CALCIUM SPEC-SCNC: 9.3 MG/DL (ref 8.6–10.5)
CHLORIDE SERPL-SCNC: 100 MMOL/L (ref 98–107)
CO2 SERPL-SCNC: 25.4 MMOL/L (ref 22–29)
CREAT BLD-MCNC: 1.23 MG/DL (ref 0.57–1)
GFR SERPL CREATININE-BSD FRML MDRD: 57 ML/MIN/1.73
GLUCOSE BLD-MCNC: 83 MG/DL (ref 65–99)
POTASSIUM BLD-SCNC: 5 MMOL/L (ref 3.5–5.2)
SODIUM BLD-SCNC: 137 MMOL/L (ref 136–145)

## 2018-04-18 PROCEDURE — 43775 LAP SLEEVE GASTRECTOMY: CPT | Performed by: SURGERY

## 2018-04-18 PROCEDURE — 43775 LAP SLEEVE GASTRECTOMY: CPT | Performed by: NURSE PRACTITIONER

## 2018-04-18 PROCEDURE — 25010000002 ONDANSETRON PER 1 MG: Performed by: ANESTHESIOLOGY

## 2018-04-18 PROCEDURE — 25010000002 DEXAMETHASONE PER 1 MG: Performed by: ANESTHESIOLOGY

## 2018-04-18 PROCEDURE — 94799 UNLISTED PULMONARY SVC/PX: CPT

## 2018-04-18 PROCEDURE — 0DB64Z3 EXCISION OF STOMACH, PERCUTANEOUS ENDOSCOPIC APPROACH, VERTICAL: ICD-10-PCS | Performed by: SURGERY

## 2018-04-18 PROCEDURE — 25010000002 VANCOMYCIN PER 500 MG: Performed by: SURGERY

## 2018-04-18 PROCEDURE — 25010000002 METOCLOPRAMIDE PER 10 MG: Performed by: SURGERY

## 2018-04-18 PROCEDURE — 25010000002 HYDROMORPHONE PER 4 MG: Performed by: ANESTHESIOLOGY

## 2018-04-18 PROCEDURE — 25010000002 PROPOFOL 10 MG/ML EMULSION: Performed by: ANESTHESIOLOGY

## 2018-04-18 PROCEDURE — 94640 AIRWAY INHALATION TREATMENT: CPT

## 2018-04-18 PROCEDURE — 25010000002 SUCCINYLCHOLINE PER 20 MG: Performed by: ANESTHESIOLOGY

## 2018-04-18 PROCEDURE — 25010000002 FENTANYL CITRATE (PF) 100 MCG/2ML SOLUTION: Performed by: ANESTHESIOLOGY

## 2018-04-18 PROCEDURE — 25010000002 KETOROLAC TROMETHAMINE PER 15 MG: Performed by: SURGERY

## 2018-04-18 PROCEDURE — 25010000002 ENOXAPARIN PER 10 MG: Performed by: SURGERY

## 2018-04-18 PROCEDURE — 80048 BASIC METABOLIC PNL TOTAL CA: CPT | Performed by: SURGERY

## 2018-04-18 PROCEDURE — 88307 TISSUE EXAM BY PATHOLOGIST: CPT | Performed by: SURGERY

## 2018-04-18 PROCEDURE — 25010000002 NEOSTIGMINE 10 MG/10ML SOLUTION: Performed by: ANESTHESIOLOGY

## 2018-04-18 PROCEDURE — 25010000002 MIDAZOLAM PER 1 MG: Performed by: ANESTHESIOLOGY

## 2018-04-18 DEVICE — SEALANT FIBRIN TISSEEL FZ 4ML: Type: IMPLANTABLE DEVICE | Site: STOMACH | Status: FUNCTIONAL

## 2018-04-18 DEVICE — PERI-STRIPS DRY WITH VERITAS COLLAGEN MATRIX (PSD-V) IS PREPARED FROM DEHYDRATED BOVINE PERICARDIUM PROCURED FROM CATTLE UNDER 30 MONTHS OF AGE IN THE UNITED STATES. ONE (1) TUBE OF PSD GEL (GEL) IS PROVIDED FOR EVERY TWO (2) POUCHES OF PSD-V. THE GEL IS USED TO CREATE A TEMPORARY BOND BETWEEN THE PSD-V BUTTRESS AND THE SURGICAL STAPLER JAWS UNTIL THE STAPLER IS POSITIONED AND FIRED.
Type: IMPLANTABLE DEVICE | Site: STOMACH | Status: FUNCTIONAL
Brand: PERI-STRIPS DRY WITH VERITAS COLLAGEN MATRIX

## 2018-04-18 RX ORDER — FAMOTIDINE 10 MG/ML
20 INJECTION, SOLUTION INTRAVENOUS EVERY 12 HOURS SCHEDULED
Status: DISCONTINUED | OUTPATIENT
Start: 2018-04-18 | End: 2018-04-19 | Stop reason: HOSPADM

## 2018-04-18 RX ORDER — ROCURONIUM BROMIDE 10 MG/ML
INJECTION, SOLUTION INTRAVENOUS AS NEEDED
Status: DISCONTINUED | OUTPATIENT
Start: 2018-04-18 | End: 2018-04-18 | Stop reason: SURG

## 2018-04-18 RX ORDER — LORAZEPAM 1 MG/1
1 TABLET ORAL EVERY 12 HOURS PRN
Status: DISCONTINUED | OUTPATIENT
Start: 2018-04-18 | End: 2018-04-19 | Stop reason: HOSPADM

## 2018-04-18 RX ORDER — FENTANYL CITRATE 50 UG/ML
INJECTION, SOLUTION INTRAMUSCULAR; INTRAVENOUS AS NEEDED
Status: DISCONTINUED | OUTPATIENT
Start: 2018-04-18 | End: 2018-04-18 | Stop reason: SURG

## 2018-04-18 RX ORDER — ACETAMINOPHEN 650 MG/1
650 SUPPOSITORY RECTAL EVERY 4 HOURS PRN
Status: DISCONTINUED | OUTPATIENT
Start: 2018-04-18 | End: 2018-04-19 | Stop reason: HOSPADM

## 2018-04-18 RX ORDER — DIPHENHYDRAMINE HYDROCHLORIDE 50 MG/ML
12.5 INJECTION INTRAMUSCULAR; INTRAVENOUS
Status: DISCONTINUED | OUTPATIENT
Start: 2018-04-18 | End: 2018-04-18 | Stop reason: HOSPADM

## 2018-04-18 RX ORDER — NALOXONE HCL 0.4 MG/ML
0.4 VIAL (ML) INJECTION
Status: DISCONTINUED | OUTPATIENT
Start: 2018-04-18 | End: 2018-04-19 | Stop reason: HOSPADM

## 2018-04-18 RX ORDER — PROMETHAZINE HYDROCHLORIDE 25 MG/ML
6.25 INJECTION, SOLUTION INTRAMUSCULAR; INTRAVENOUS ONCE AS NEEDED
Status: DISCONTINUED | OUTPATIENT
Start: 2018-04-18 | End: 2018-04-19 | Stop reason: HOSPADM

## 2018-04-18 RX ORDER — EPHEDRINE SULFATE 50 MG/ML
5 INJECTION, SOLUTION INTRAVENOUS ONCE AS NEEDED
Status: DISCONTINUED | OUTPATIENT
Start: 2018-04-18 | End: 2018-04-18 | Stop reason: HOSPADM

## 2018-04-18 RX ORDER — PROPOFOL 10 MG/ML
VIAL (ML) INTRAVENOUS AS NEEDED
Status: DISCONTINUED | OUTPATIENT
Start: 2018-04-18 | End: 2018-04-18 | Stop reason: SURG

## 2018-04-18 RX ORDER — HYDRALAZINE HYDROCHLORIDE 20 MG/ML
5 INJECTION INTRAMUSCULAR; INTRAVENOUS
Status: DISCONTINUED | OUTPATIENT
Start: 2018-04-18 | End: 2018-04-19 | Stop reason: HOSPADM

## 2018-04-18 RX ORDER — LIDOCAINE HYDROCHLORIDE 10 MG/ML
0.5 INJECTION, SOLUTION EPIDURAL; INFILTRATION; INTRACAUDAL; PERINEURAL ONCE AS NEEDED
Status: DISCONTINUED | OUTPATIENT
Start: 2018-04-18 | End: 2018-04-18 | Stop reason: HOSPADM

## 2018-04-18 RX ORDER — NALOXONE HCL 0.4 MG/ML
0.2 VIAL (ML) INJECTION AS NEEDED
Status: DISCONTINUED | OUTPATIENT
Start: 2018-04-18 | End: 2018-04-18 | Stop reason: HOSPADM

## 2018-04-18 RX ORDER — PROMETHAZINE HYDROCHLORIDE 25 MG/1
25 SUPPOSITORY RECTAL ONCE AS NEEDED
Status: DISCONTINUED | OUTPATIENT
Start: 2018-04-18 | End: 2018-04-18 | Stop reason: HOSPADM

## 2018-04-18 RX ORDER — DIPHENHYDRAMINE HYDROCHLORIDE 50 MG/ML
12.5 INJECTION INTRAMUSCULAR; INTRAVENOUS
Status: DISCONTINUED | OUTPATIENT
Start: 2018-04-18 | End: 2018-04-19 | Stop reason: HOSPADM

## 2018-04-18 RX ORDER — PROMETHAZINE HYDROCHLORIDE 25 MG/1
25 TABLET ORAL ONCE AS NEEDED
Status: DISCONTINUED | OUTPATIENT
Start: 2018-04-18 | End: 2018-04-19 | Stop reason: HOSPADM

## 2018-04-18 RX ORDER — GLYCOPYRROLATE 0.2 MG/ML
INJECTION INTRAMUSCULAR; INTRAVENOUS AS NEEDED
Status: DISCONTINUED | OUTPATIENT
Start: 2018-04-18 | End: 2018-04-18 | Stop reason: SURG

## 2018-04-18 RX ORDER — ONDANSETRON 4 MG/1
4 TABLET, FILM COATED ORAL EVERY 4 HOURS PRN
Status: DISCONTINUED | OUTPATIENT
Start: 2018-04-18 | End: 2018-04-19 | Stop reason: HOSPADM

## 2018-04-18 RX ORDER — HYDROMORPHONE HYDROCHLORIDE 1 MG/ML
0.5 INJECTION, SOLUTION INTRAMUSCULAR; INTRAVENOUS; SUBCUTANEOUS
Status: DISCONTINUED | OUTPATIENT
Start: 2018-04-18 | End: 2018-04-18 | Stop reason: HOSPADM

## 2018-04-18 RX ORDER — METOCLOPRAMIDE HYDROCHLORIDE 5 MG/ML
10 INJECTION INTRAMUSCULAR; INTRAVENOUS EVERY 6 HOURS
Status: DISCONTINUED | OUTPATIENT
Start: 2018-04-18 | End: 2018-04-19 | Stop reason: HOSPADM

## 2018-04-18 RX ORDER — HYDROMORPHONE HYDROCHLORIDE 1 MG/ML
0.5 INJECTION, SOLUTION INTRAMUSCULAR; INTRAVENOUS; SUBCUTANEOUS
Status: DISCONTINUED | OUTPATIENT
Start: 2018-04-18 | End: 2018-04-19 | Stop reason: HOSPADM

## 2018-04-18 RX ORDER — PROMETHAZINE HYDROCHLORIDE 25 MG/1
25 TABLET ORAL ONCE AS NEEDED
Status: DISCONTINUED | OUTPATIENT
Start: 2018-04-18 | End: 2018-04-18 | Stop reason: HOSPADM

## 2018-04-18 RX ORDER — SODIUM CHLORIDE, SODIUM LACTATE, POTASSIUM CHLORIDE, CALCIUM CHLORIDE 600; 310; 30; 20 MG/100ML; MG/100ML; MG/100ML; MG/100ML
100 INJECTION, SOLUTION INTRAVENOUS CONTINUOUS
Status: DISCONTINUED | OUTPATIENT
Start: 2018-04-18 | End: 2018-04-18 | Stop reason: HOSPADM

## 2018-04-18 RX ORDER — HYDRALAZINE HYDROCHLORIDE 20 MG/ML
5 INJECTION INTRAMUSCULAR; INTRAVENOUS
Status: DISCONTINUED | OUTPATIENT
Start: 2018-04-18 | End: 2018-04-18 | Stop reason: HOSPADM

## 2018-04-18 RX ORDER — NALBUPHINE HCL 10 MG/ML
2 AMPUL (ML) INJECTION EVERY 4 HOURS PRN
Status: DISCONTINUED | OUTPATIENT
Start: 2018-04-18 | End: 2018-04-19 | Stop reason: HOSPADM

## 2018-04-18 RX ORDER — OXYCODONE AND ACETAMINOPHEN 7.5; 325 MG/1; MG/1
1 TABLET ORAL ONCE AS NEEDED
Status: DISCONTINUED | OUTPATIENT
Start: 2018-04-18 | End: 2018-04-18 | Stop reason: HOSPADM

## 2018-04-18 RX ORDER — ACETAMINOPHEN 160 MG/5ML
650 SOLUTION ORAL EVERY 4 HOURS PRN
Status: DISCONTINUED | OUTPATIENT
Start: 2018-04-18 | End: 2018-04-19 | Stop reason: HOSPADM

## 2018-04-18 RX ORDER — ACETAMINOPHEN 650 MG/1
650 SUPPOSITORY RECTAL ONCE AS NEEDED
Status: DISCONTINUED | OUTPATIENT
Start: 2018-04-18 | End: 2018-04-19 | Stop reason: HOSPADM

## 2018-04-18 RX ORDER — NALOXONE HCL 0.4 MG/ML
0.4 VIAL (ML) INJECTION AS NEEDED
Status: DISCONTINUED | OUTPATIENT
Start: 2018-04-18 | End: 2018-04-19 | Stop reason: HOSPADM

## 2018-04-18 RX ORDER — FENTANYL CITRATE 50 UG/ML
50 INJECTION, SOLUTION INTRAMUSCULAR; INTRAVENOUS
Status: DISCONTINUED | OUTPATIENT
Start: 2018-04-18 | End: 2018-04-18 | Stop reason: HOSPADM

## 2018-04-18 RX ORDER — SUCCINYLCHOLINE CHLORIDE 20 MG/ML
INJECTION INTRAMUSCULAR; INTRAVENOUS AS NEEDED
Status: DISCONTINUED | OUTPATIENT
Start: 2018-04-18 | End: 2018-04-18 | Stop reason: SURG

## 2018-04-18 RX ORDER — MAGNESIUM HYDROXIDE 1200 MG/15ML
LIQUID ORAL AS NEEDED
Status: DISCONTINUED | OUTPATIENT
Start: 2018-04-18 | End: 2018-04-18 | Stop reason: HOSPADM

## 2018-04-18 RX ORDER — PROMETHAZINE HYDROCHLORIDE 25 MG/ML
12.5 INJECTION, SOLUTION INTRAMUSCULAR; INTRAVENOUS EVERY 4 HOURS PRN
Status: DISCONTINUED | OUTPATIENT
Start: 2018-04-18 | End: 2018-04-19 | Stop reason: HOSPADM

## 2018-04-18 RX ORDER — ONDANSETRON 2 MG/ML
4 INJECTION INTRAMUSCULAR; INTRAVENOUS ONCE AS NEEDED
Status: DISCONTINUED | OUTPATIENT
Start: 2018-04-18 | End: 2018-04-18 | Stop reason: HOSPADM

## 2018-04-18 RX ORDER — FLUMAZENIL 0.1 MG/ML
0.2 INJECTION INTRAVENOUS AS NEEDED
Status: DISCONTINUED | OUTPATIENT
Start: 2018-04-18 | End: 2018-04-18 | Stop reason: HOSPADM

## 2018-04-18 RX ORDER — MIDAZOLAM HYDROCHLORIDE 1 MG/ML
1 INJECTION INTRAMUSCULAR; INTRAVENOUS
Status: DISCONTINUED | OUTPATIENT
Start: 2018-04-18 | End: 2018-04-18 | Stop reason: HOSPADM

## 2018-04-18 RX ORDER — LIDOCAINE HYDROCHLORIDE 20 MG/ML
INJECTION, SOLUTION INFILTRATION; PERINEURAL AS NEEDED
Status: DISCONTINUED | OUTPATIENT
Start: 2018-04-18 | End: 2018-04-18 | Stop reason: SURG

## 2018-04-18 RX ORDER — PROMETHAZINE HYDROCHLORIDE 25 MG/1
25 SUPPOSITORY RECTAL ONCE AS NEEDED
Status: DISCONTINUED | OUTPATIENT
Start: 2018-04-18 | End: 2018-04-19 | Stop reason: HOSPADM

## 2018-04-18 RX ORDER — CHLORHEXIDINE GLUCONATE 0.12 MG/ML
15 RINSE ORAL SEE ADMIN INSTRUCTIONS
Status: COMPLETED | OUTPATIENT
Start: 2018-04-18 | End: 2018-04-18

## 2018-04-18 RX ORDER — VANCOMYCIN HYDROCHLORIDE 1 G/200ML
1000 INJECTION, SOLUTION INTRAVENOUS
Status: COMPLETED | OUTPATIENT
Start: 2018-04-18 | End: 2018-04-18

## 2018-04-18 RX ORDER — NEOSTIGMINE METHYLSULFATE 1 MG/ML
INJECTION, SOLUTION INTRAVENOUS AS NEEDED
Status: DISCONTINUED | OUTPATIENT
Start: 2018-04-18 | End: 2018-04-18 | Stop reason: SURG

## 2018-04-18 RX ORDER — ONDANSETRON 4 MG/1
4 TABLET, ORALLY DISINTEGRATING ORAL EVERY 4 HOURS PRN
Status: DISCONTINUED | OUTPATIENT
Start: 2018-04-18 | End: 2018-04-19 | Stop reason: HOSPADM

## 2018-04-18 RX ORDER — HYDROMORPHONE HYDROCHLORIDE 2 MG/1
2 TABLET ORAL EVERY 4 HOURS PRN
Status: DISCONTINUED | OUTPATIENT
Start: 2018-04-18 | End: 2018-04-19 | Stop reason: HOSPADM

## 2018-04-18 RX ORDER — CLONIDINE HYDROCHLORIDE 0.1 MG/1
0.1 TABLET ORAL EVERY 6 HOURS PRN
Status: DISCONTINUED | OUTPATIENT
Start: 2018-04-18 | End: 2018-04-19 | Stop reason: HOSPADM

## 2018-04-18 RX ORDER — DIPHENHYDRAMINE HYDROCHLORIDE 50 MG/ML
25 INJECTION INTRAMUSCULAR; INTRAVENOUS EVERY 4 HOURS PRN
Status: DISCONTINUED | OUTPATIENT
Start: 2018-04-18 | End: 2018-04-19 | Stop reason: HOSPADM

## 2018-04-18 RX ORDER — NITROGLYCERIN 0.4 MG/1
0.4 TABLET SUBLINGUAL
Status: DISCONTINUED | OUTPATIENT
Start: 2018-04-18 | End: 2018-04-19 | Stop reason: HOSPADM

## 2018-04-18 RX ORDER — LABETALOL HYDROCHLORIDE 5 MG/ML
5 INJECTION, SOLUTION INTRAVENOUS
Status: DISCONTINUED | OUTPATIENT
Start: 2018-04-18 | End: 2018-04-18 | Stop reason: HOSPADM

## 2018-04-18 RX ORDER — NALBUPHINE HCL 10 MG/ML
10 AMPUL (ML) INJECTION EVERY 4 HOURS PRN
Status: DISCONTINUED | OUTPATIENT
Start: 2018-04-18 | End: 2018-04-19 | Stop reason: HOSPADM

## 2018-04-18 RX ORDER — CYANOCOBALAMIN 1000 UG/ML
1000 INJECTION, SOLUTION INTRAMUSCULAR; SUBCUTANEOUS ONCE
Status: COMPLETED | OUTPATIENT
Start: 2018-04-19 | End: 2018-04-19

## 2018-04-18 RX ORDER — ONDANSETRON 2 MG/ML
4 INJECTION INTRAMUSCULAR; INTRAVENOUS EVERY 4 HOURS PRN
Status: DISCONTINUED | OUTPATIENT
Start: 2018-04-18 | End: 2018-04-19 | Stop reason: HOSPADM

## 2018-04-18 RX ORDER — HYDROCODONE BITARTRATE AND ACETAMINOPHEN 7.5; 325 MG/1; MG/1
1 TABLET ORAL ONCE AS NEEDED
Status: DISCONTINUED | OUTPATIENT
Start: 2018-04-18 | End: 2018-04-18 | Stop reason: HOSPADM

## 2018-04-18 RX ORDER — PROMETHAZINE HYDROCHLORIDE 25 MG/1
12.5 TABLET ORAL ONCE AS NEEDED
Status: DISCONTINUED | OUTPATIENT
Start: 2018-04-18 | End: 2018-04-18 | Stop reason: HOSPADM

## 2018-04-18 RX ORDER — ACETAMINOPHEN 160 MG/5ML
975 SOLUTION ORAL ONCE
Status: COMPLETED | OUTPATIENT
Start: 2018-04-18 | End: 2018-04-18

## 2018-04-18 RX ORDER — SODIUM CHLORIDE 0.9 % (FLUSH) 0.9 %
1-10 SYRINGE (ML) INJECTION AS NEEDED
Status: DISCONTINUED | OUTPATIENT
Start: 2018-04-18 | End: 2018-04-18 | Stop reason: HOSPADM

## 2018-04-18 RX ORDER — SCOLOPAMINE TRANSDERMAL SYSTEM 1 MG/1
1 PATCH, EXTENDED RELEASE TRANSDERMAL ONCE
Status: DISCONTINUED | OUTPATIENT
Start: 2018-04-18 | End: 2018-04-18

## 2018-04-18 RX ORDER — PROMETHAZINE HYDROCHLORIDE 25 MG/ML
12.5 INJECTION, SOLUTION INTRAMUSCULAR; INTRAVENOUS ONCE AS NEEDED
Status: DISCONTINUED | OUTPATIENT
Start: 2018-04-18 | End: 2018-04-18 | Stop reason: HOSPADM

## 2018-04-18 RX ORDER — NALOXONE HCL 0.4 MG/ML
0.1 VIAL (ML) INJECTION
Status: DISCONTINUED | OUTPATIENT
Start: 2018-04-18 | End: 2018-04-19 | Stop reason: HOSPADM

## 2018-04-18 RX ORDER — BUPIVACAINE HYDROCHLORIDE AND EPINEPHRINE 5; 5 MG/ML; UG/ML
INJECTION, SOLUTION PERINEURAL AS NEEDED
Status: DISCONTINUED | OUTPATIENT
Start: 2018-04-18 | End: 2018-04-18 | Stop reason: HOSPADM

## 2018-04-18 RX ORDER — DEXAMETHASONE SODIUM PHOSPHATE 10 MG/ML
INJECTION INTRAMUSCULAR; INTRAVENOUS AS NEEDED
Status: DISCONTINUED | OUTPATIENT
Start: 2018-04-18 | End: 2018-04-18 | Stop reason: SURG

## 2018-04-18 RX ORDER — LABETALOL HYDROCHLORIDE 5 MG/ML
10 INJECTION, SOLUTION INTRAVENOUS
Status: DISCONTINUED | OUTPATIENT
Start: 2018-04-18 | End: 2018-04-19 | Stop reason: HOSPADM

## 2018-04-18 RX ORDER — MORPHINE SULFATE 2 MG/ML
2 INJECTION, SOLUTION INTRAMUSCULAR; INTRAVENOUS
Status: DISCONTINUED | OUTPATIENT
Start: 2018-04-18 | End: 2018-04-19 | Stop reason: HOSPADM

## 2018-04-18 RX ORDER — ALBUTEROL SULFATE 2.5 MG/3ML
2.5 SOLUTION RESPIRATORY (INHALATION)
Status: DISCONTINUED | OUTPATIENT
Start: 2018-04-18 | End: 2018-04-19 | Stop reason: HOSPADM

## 2018-04-18 RX ORDER — NEBIVOLOL 10 MG/1
20 TABLET ORAL DAILY
Status: DISCONTINUED | OUTPATIENT
Start: 2018-04-18 | End: 2018-04-19 | Stop reason: HOSPADM

## 2018-04-18 RX ORDER — FENTANYL CITRATE 50 UG/ML
50 INJECTION, SOLUTION INTRAMUSCULAR; INTRAVENOUS
Status: DISCONTINUED | OUTPATIENT
Start: 2018-04-18 | End: 2018-04-19 | Stop reason: HOSPADM

## 2018-04-18 RX ORDER — ACETAMINOPHEN 325 MG/1
650 TABLET ORAL ONCE AS NEEDED
Status: DISCONTINUED | OUTPATIENT
Start: 2018-04-18 | End: 2018-04-19 | Stop reason: HOSPADM

## 2018-04-18 RX ORDER — FAMOTIDINE 10 MG/ML
20 INJECTION, SOLUTION INTRAVENOUS ONCE
Status: COMPLETED | OUTPATIENT
Start: 2018-04-18 | End: 2018-04-18

## 2018-04-18 RX ORDER — KETOROLAC TROMETHAMINE 30 MG/ML
30 INJECTION, SOLUTION INTRAMUSCULAR; INTRAVENOUS 4 TIMES DAILY
Status: DISCONTINUED | OUTPATIENT
Start: 2018-04-18 | End: 2018-04-19 | Stop reason: HOSPADM

## 2018-04-18 RX ORDER — SODIUM CHLORIDE 9 MG/ML
INJECTION, SOLUTION INTRAVENOUS AS NEEDED
Status: DISCONTINUED | OUTPATIENT
Start: 2018-04-18 | End: 2018-04-18 | Stop reason: HOSPADM

## 2018-04-18 RX ORDER — ONDANSETRON 2 MG/ML
INJECTION INTRAMUSCULAR; INTRAVENOUS AS NEEDED
Status: DISCONTINUED | OUTPATIENT
Start: 2018-04-18 | End: 2018-04-18 | Stop reason: SURG

## 2018-04-18 RX ORDER — MIDAZOLAM HYDROCHLORIDE 1 MG/ML
2 INJECTION INTRAMUSCULAR; INTRAVENOUS
Status: DISCONTINUED | OUTPATIENT
Start: 2018-04-18 | End: 2018-04-18 | Stop reason: HOSPADM

## 2018-04-18 RX ORDER — SODIUM CHLORIDE, SODIUM LACTATE, POTASSIUM CHLORIDE, CALCIUM CHLORIDE 600; 310; 30; 20 MG/100ML; MG/100ML; MG/100ML; MG/100ML
150 INJECTION, SOLUTION INTRAVENOUS CONTINUOUS
Status: DISCONTINUED | OUTPATIENT
Start: 2018-04-18 | End: 2018-04-19 | Stop reason: HOSPADM

## 2018-04-18 RX ORDER — ACETAMINOPHEN 325 MG/1
650 TABLET ORAL EVERY 4 HOURS PRN
Status: DISCONTINUED | OUTPATIENT
Start: 2018-04-18 | End: 2018-04-19 | Stop reason: HOSPADM

## 2018-04-18 RX ORDER — METOCLOPRAMIDE HYDROCHLORIDE 5 MG/ML
10 INJECTION INTRAMUSCULAR; INTRAVENOUS ONCE
Status: COMPLETED | OUTPATIENT
Start: 2018-04-18 | End: 2018-04-18

## 2018-04-18 RX ORDER — SODIUM CHLORIDE, SODIUM LACTATE, POTASSIUM CHLORIDE, CALCIUM CHLORIDE 600; 310; 30; 20 MG/100ML; MG/100ML; MG/100ML; MG/100ML
9 INJECTION, SOLUTION INTRAVENOUS CONTINUOUS
Status: DISCONTINUED | OUTPATIENT
Start: 2018-04-18 | End: 2018-04-18 | Stop reason: HOSPADM

## 2018-04-18 RX ORDER — OXYCODONE HYDROCHLORIDE AND ACETAMINOPHEN 5; 325 MG/1; MG/1
1 TABLET ORAL ONCE AS NEEDED
Status: DISCONTINUED | OUTPATIENT
Start: 2018-04-18 | End: 2018-04-19 | Stop reason: HOSPADM

## 2018-04-18 RX ADMIN — ONDANSETRON 4 MG: 2 INJECTION INTRAMUSCULAR; INTRAVENOUS at 15:33

## 2018-04-18 RX ADMIN — METOCLOPRAMIDE 10 MG: 5 INJECTION, SOLUTION INTRAMUSCULAR; INTRAVENOUS at 13:15

## 2018-04-18 RX ADMIN — FOLIC ACID 250 ML/HR: 5 INJECTION, SOLUTION INTRAMUSCULAR; INTRAVENOUS; SUBCUTANEOUS at 23:21

## 2018-04-18 RX ADMIN — ENOXAPARIN SODIUM 40 MG: 40 INJECTION SUBCUTANEOUS at 15:00

## 2018-04-18 RX ADMIN — SODIUM CHLORIDE, POTASSIUM CHLORIDE, SODIUM LACTATE AND CALCIUM CHLORIDE 500 ML: 600; 310; 30; 20 INJECTION, SOLUTION INTRAVENOUS at 13:15

## 2018-04-18 RX ADMIN — LIDOCAINE HYDROCHLORIDE 50 MG: 20 INJECTION, SOLUTION INFILTRATION; PERINEURAL at 15:09

## 2018-04-18 RX ADMIN — SODIUM CHLORIDE, POTASSIUM CHLORIDE, SODIUM LACTATE AND CALCIUM CHLORIDE 150 ML/HR: 600; 310; 30; 20 INJECTION, SOLUTION INTRAVENOUS at 18:45

## 2018-04-18 RX ADMIN — GLYCOPYRROLATE 0.2 MG: 0.2 INJECTION INTRAMUSCULAR; INTRAVENOUS at 15:55

## 2018-04-18 RX ADMIN — HYDROMORPHONE HYDROCHLORIDE 0.5 MG: 1 INJECTION, SOLUTION INTRAMUSCULAR; INTRAVENOUS; SUBCUTANEOUS at 17:05

## 2018-04-18 RX ADMIN — PROPOFOL 200 MG: 10 INJECTION, EMULSION INTRAVENOUS at 15:09

## 2018-04-18 RX ADMIN — NEOSTIGMINE METHYLSULFATE 2 MG: 1 INJECTION INTRAVENOUS at 15:55

## 2018-04-18 RX ADMIN — SODIUM CHLORIDE, POTASSIUM CHLORIDE, SODIUM LACTATE AND CALCIUM CHLORIDE: 600; 310; 30; 20 INJECTION, SOLUTION INTRAVENOUS at 15:55

## 2018-04-18 RX ADMIN — ROCURONIUM BROMIDE 25 MG: 10 INJECTION INTRAVENOUS at 15:15

## 2018-04-18 RX ADMIN — SCOPALAMINE 1 PATCH: 1 PATCH, EXTENDED RELEASE TRANSDERMAL at 13:14

## 2018-04-18 RX ADMIN — NEBIVOLOL HYDROCHLORIDE 20 MG: 10 TABLET ORAL at 21:31

## 2018-04-18 RX ADMIN — SODIUM CHLORIDE 2 G: 9 INJECTION, SOLUTION INTRAVENOUS at 15:15

## 2018-04-18 RX ADMIN — ALBUTEROL SULFATE 2.5 MG: 2.5 SOLUTION RESPIRATORY (INHALATION) at 20:16

## 2018-04-18 RX ADMIN — FAMOTIDINE 20 MG: 10 INJECTION INTRAVENOUS at 21:31

## 2018-04-18 RX ADMIN — ACETAMINOPHEN 975 MG: 325 SOLUTION ORAL at 13:19

## 2018-04-18 RX ADMIN — FENTANYL CITRATE 100 MCG: 50 INJECTION INTRAMUSCULAR; INTRAVENOUS at 15:09

## 2018-04-18 RX ADMIN — METOCLOPRAMIDE 10 MG: 5 INJECTION, SOLUTION INTRAMUSCULAR; INTRAVENOUS at 18:45

## 2018-04-18 RX ADMIN — DEXAMETHASONE SODIUM PHOSPHATE 8 MG: 10 INJECTION INTRAMUSCULAR; INTRAVENOUS at 15:15

## 2018-04-18 RX ADMIN — SUCCINYLCHOLINE CHLORIDE 160 MG: 20 INJECTION, SOLUTION INTRAMUSCULAR; INTRAVENOUS; PARENTERAL at 15:09

## 2018-04-18 RX ADMIN — VANCOMYCIN HYDROCHLORIDE 1000 MG: 1 INJECTION, SOLUTION INTRAVENOUS at 14:19

## 2018-04-18 RX ADMIN — CHLORHEXIDINE GLUCONATE 15 ML: 1.2 RINSE ORAL at 13:22

## 2018-04-18 RX ADMIN — GLYCOPYRROLATE 0.2 MG: 0.2 INJECTION INTRAMUSCULAR; INTRAVENOUS at 15:09

## 2018-04-18 RX ADMIN — HYOSCYAMINE SULFATE 125 MCG: 0.12 TABLET ORAL at 21:31

## 2018-04-18 RX ADMIN — MIDAZOLAM HYDROCHLORIDE 1 MG: 2 INJECTION, SOLUTION INTRAMUSCULAR; INTRAVENOUS at 13:54

## 2018-04-18 RX ADMIN — ROCURONIUM BROMIDE 5 MG: 10 INJECTION INTRAVENOUS at 15:09

## 2018-04-18 RX ADMIN — FAMOTIDINE 20 MG: 10 INJECTION INTRAVENOUS at 13:15

## 2018-04-18 RX ADMIN — KETOROLAC TROMETHAMINE 30 MG: 30 INJECTION, SOLUTION INTRAMUSCULAR at 21:31

## 2018-04-18 NOTE — ANESTHESIA POSTPROCEDURE EVALUATION
Patient: Shruthi HEWITT Overall    Procedure Summary     Date:  04/18/18 Room / Location:   [facility]  [facility]    Anesthesia Start:  1504 Anesthesia Stop:  1612    Procedure:  GASTRIC SLEEVE LAPAROSCOPIC (N/A Abdomen) Diagnosis:       BMI 50.0-59.9, adult      (BMI 50.0-59.9, adult [Z68.43])    Surgeon:  Christ Sunshine Jr., MD Provider:  Sg Beyer MD    Anesthesia Type:  general ASA Status:  3          Anesthesia Type: general  Last vitals  BP   152/77 (04/18/18 1700)   Temp   36.8 °C (98.3 °F) (04/18/18 1609)   Pulse   61 (04/18/18 1700)   Resp   20 (04/18/18 1700)     SpO2   99 % (04/18/18 1700)     Post Anesthesia Care and Evaluation    Patient location during evaluation: bedside  Patient participation: complete - patient participated  Level of consciousness: awake and alert  Pain management: adequate  Airway patency: patent  Anesthetic complications: No anesthetic complications    Cardiovascular status: acceptable  Respiratory status: acceptable  Hydration status: acceptable    Comments: /77   Pulse 61   Temp 36.8 °C (98.3 °F) (Temporal Artery )   Resp 20   SpO2 99%

## 2018-04-18 NOTE — OP NOTE
PREOPERATIVE DIAGNOSIS:  Morbid obesity with multiple comorbidities as referenced in the most recent history and physical.    POSTOPERATIVE DIAGNOSIS:  Morbid obesity with multiple comorbidities as referenced in the most recent history and physical.    PROCEDURES PERFORMED:  1.  Laparoscopic sleeve gastrectomy.  2.  Tisseel application.     SURGEON:  Christ Sunshine Jr., MD    ASSISTANT:  CRAOLYN Gordillo, [facility]    Surgery assisted and facilitated by a certified physician assistant, who directly resulted in a decreased operative time, anesthetic time, wound exposure, and possibly of an operative wound infection, thereby decreasing patient morbidity and ultimately total expenditures.  The surgical assistant assisted in placement of trochars, take down of the gastrocolic omentum, short gastric vessels and dissection at the angle of His.  Also assisted in retraction of the stomach during stapling so as not to kink the gastric sleeve.  Also assisted in removing of the gastric specimen, closure of the fascial defect as well as closure of the skin incisions.    ANESTHESIA:  General endotracheal.    ESTIMATED BLOOD LOSS:   Less than 25 mL unless dictated below.    FLUIDS:  Crystalloids.    SPECIMENS:  Gastric remnant    DRAINS:  None.    COUNTS:  Correct.    COMPLICATIONS:  None.    INDICATIONS:  This patient with morbid obesity and associated comorbidities presents for elective laparoscopic, possible open sleeve gastrectomy.  The patient has received medical clearance to proceed.  The patient has undergone our extensive educational process and consent process and wishes to proceed.    DESCRIPTION OF PROCEDURE:  The patient was brought to the operating room and placed supine upon the operating room table. SCD hose were placed.  The patient underwent uneventful general endotracheal anesthesia per the anesthesiology staff. The abdomen was prepped with ChloraPrep and draped in the usual sterile fashion.  An Ioban was  used as well if not allergic. Anesthesia staff then passed a 36-Tongan ViSiGi bougie into the stomach to decompress and then was pulled back to the mouth.    A 5-10 mm transverse incision was made a few centimeters above and to the left of the umbilicus and the peritoneal cavity entered under direct camera visualization using a 5 or 10 mm 0° laparoscope and an Optiview trocar.  The abdomen was then insufflated to a pressure of 15-16 mmHg with CO2 gas.  Exploratory laparoscopy revealed no evidence of injury from the entrance technique and no significant abnormalities unless addendum dictated below.  An angled laparoscope was then used.  The patient was placed in reverse Trendelenburg position.  Under direct camera visualization a 5 mm trocar was placed in the right lateral subcostal position.  A 12 mm trocar was placed in the right midabdominal position.  A 5 mm trocar was placed in the left midabdominal position. A Beatrice retractor was placed through an epigastric incision and used to elevate the left lobe of the liver.  The fat pad was elevated and the left archie exposed.  At this point, approximately correction along the greater curvature, the gastrocolic omentum was divided with the Enseal and this proceeded superiorly to the angle of His taking down the short gastric vessels.  All posterior attachments of the lesser sac and posterior aspect of the stomach to the pancreas were taken down as well.  The left archie was exposed along its length.  Dissection then proceeded medially taking down the greater curvature with an Enseal until just proximal to the pylorus.  The 36-Tongan ViSiGi bougie was passed back down into the stomach by anesthesia and the sleeve gastrectomy was then performed.  The 1st load was a black, thick tissue load on the Valmeyer Flex stapler with Veritas Esha-Strip and this was placed 3-4 cm proximal to the pylorus and up against the bougie pulling it anteriorly and posteriorly up against the bougie  but paying close attention not to narrow the incisura.  The next 5-7 loads were green with absorbable Veritas Esha-Strips depending on the size of the stomach. Careful attention was made to stay about 1 cm from the esophagus. Areas of the reinforced staple line were oversewn with absorbable sutures as needed for bleeding or questionable staple lines.  At this point, the gastrectomy specimen was withdrawn through the 12 mm trocar site incision. The specimen was then opened up on a back table and the staple line was inspected and was intact.  If no abnormalities were seen in the specimen the gastric specimen was then discarded.  If abnormalities were seen the specimen was then sent off to pathology.  At this point, the sleeve was submerged under saline and using the ViSiGi bougie to insufflate the stomach, a leak test was performed.  This revealed the sleeve to be watertight, no air bubbles, no leak, and no bleeding seen from the staple lines and no significant abnormalities.  Irrigation fluid from the abdomen was then suctioned.  The gastric sleeve staple line was then treated with 4 mL Tisseel fibrin glue. The fascia at the 12 mm trocar site incision was closed with a single 0 Vicryl suture in a figure-of-eight fashion placed under direct laparoscopic camera visualization with a suture passer and tying the knot extracorporeally.  The fascia in the area was infiltrated with local anesthesia. All incisions were then infiltrated with local anesthetic. The remaining trocars were removed under direct camera visualization with no bleeding noted from their sites.  The abdomen was desufflated of gas. The skin in each incision was closed using 4-0 antibiotic impregnated Monocryl in a subcuticular fashion followed by Dermabond.  The patient tolerated the procedure well without complication and was taken to the recovery room in stable condition.  All sponge, needle and instrument counts were correct.     The hiatus was checked  for a hernia and no hernia was detected.

## 2018-04-18 NOTE — ANESTHESIA PREPROCEDURE EVALUATION
Anesthesia Evaluation     NPO Solid Status: > 8 hours             Airway   Mallampati: I  TM distance: >3 FB  Neck ROM: full  No difficulty expected  Dental - normal exam     Pulmonary - normal exam   (+) sleep apnea,   Cardiovascular     Rhythm: regular    (+) hypertension,       Neuro/Psych  (+) psychiatric history,     GI/Hepatic/Renal/Endo    (+) obesity, morbid obesity,      Musculoskeletal     Abdominal  - normal exam  (+) obese,    Substance History      OB/GYN          Other                        Anesthesia Plan    ASA 3     general   (  D/W R&B of GA including but not limited to: heart, lung, liver, kidney, neurologic problems, positioning injuries, dental damage, corneal abrasion and TMJ.  .)  intravenous induction   Anesthetic plan and risks discussed with patient.

## 2018-04-18 NOTE — ANESTHESIA PROCEDURE NOTES
Airway  Airway not difficult    General Information and Staff    Anesthesiologist: IVAN ROBLERO    Indications and Patient Condition    Preoxygenated: yes  Mask difficulty assessment: 1 - vent by mask    Final Airway Details  Final airway type: endotracheal airway      Successful airway: ETT  Cuffed: yes   Successful intubation technique: direct laryngoscopy  Endotracheal tube insertion site: oral  Blade: Teague  Blade size: #2  ETT size: 7.0 mm  Cormack-Lehane Classification: grade I - full view of glottis  Placement verified by: chest auscultation and capnometry   Measured from: teeth  ETT to teeth (cm): 22    Additional Comments  Teeth checked after intubation, no damage noted.

## 2018-04-18 NOTE — ANESTHESIA POSTPROCEDURE EVALUATION
Patient: Shruthi HEWITT Overall    Procedure Summary     Date:  04/18/18 Room / Location:   [facility]    Anesthesia Start:  1504 Anesthesia Stop:      Procedure:  GASTRIC SLEEVE LAPAROSCOPIC (N/A Abdomen) Diagnosis:       BMI 50.0-59.9, adult      (BMI 50.0-59.9, adult [Z68.43])    Surgeon:  Christ Sunshine Jr., MD Provider:  Sg Beyer MD    Anesthesia Type:  general ASA Status:  3          Anesthesia Type: general  Last vitals  BP   127/59 (04/18/18 1215)   Temp   36.7 °C (98 °F) (04/18/18 1215)   Pulse   72 (04/18/18 1359)   Resp   16 (04/18/18 1215)     SpO2   97 % (04/18/18 1359)     Post Anesthesia Care and Evaluation    Patient location during evaluation: bedside  Patient participation: complete - patient participated  Level of consciousness: awake and alert  Pain management: adequate  Airway patency: patent  Anesthetic complications: No anesthetic complications    Cardiovascular status: acceptable  Respiratory status: acceptable  Hydration status: acceptable    Comments: /59   Pulse 72   Temp 36.7 °C (98 °F) (Oral)   Resp 16   SpO2 97%     Pt seen in PACU prior toDC

## 2018-04-18 NOTE — PLAN OF CARE
Problem: Patient Care Overview  Goal: Plan of Care Review  Outcome: Ongoing (interventions implemented as appropriate)   04/18/18 6020   Coping/Psychosocial   Plan of Care Reviewed With patient   Plan of Care Review   Progress no change   OTHER   Outcome Summary Admitted to [facility] post gastric sleeve. Monitor overnight.      Goal: Individualization and Mutuality  Outcome: Ongoing (interventions implemented as appropriate)    Goal: Discharge Needs Assessment  Outcome: Ongoing (interventions implemented as appropriate)    Goal: Interprofessional Rounds/Family Conf  Outcome: Ongoing (interventions implemented as appropriate)      Problem: Bariatric Surgery (Adult,Pediatric)  Goal: Signs and Symptoms of Listed Potential Problems Will be Absent, Minimized or Managed (Bariatric Surgery)  Outcome: Outcome(s) achieved Date Met: 04/18/18    Goal: Anesthesia/Sedation Recovery  Outcome: Outcome(s) achieved Date Met: 04/18/18

## 2018-04-19 VITALS
OXYGEN SATURATION: 91 % | HEART RATE: 68 BPM | TEMPERATURE: 98.9 F | RESPIRATION RATE: 16 BRPM | SYSTOLIC BLOOD PRESSURE: 165 MMHG | BODY MASS INDEX: 48.82 KG/M2 | HEIGHT: 65 IN | WEIGHT: 293 LBS | DIASTOLIC BLOOD PRESSURE: 82 MMHG

## 2018-04-19 LAB
ALBUMIN SERPL-MCNC: 4 G/DL (ref 3.5–5.2)
ALBUMIN/GLOB SERPL: 1.3 G/DL
ALP SERPL-CCNC: 54 U/L (ref 39–117)
ALT SERPL W P-5'-P-CCNC: 27 U/L (ref 1–33)
ANION GAP SERPL CALCULATED.3IONS-SCNC: 13.4 MMOL/L
AST SERPL-CCNC: 20 U/L (ref 1–32)
BASOPHILS # BLD AUTO: 0 10*3/MM3 (ref 0–0.2)
BASOPHILS NFR BLD AUTO: 0 % (ref 0–1.5)
BILIRUB SERPL-MCNC: 0.3 MG/DL (ref 0.1–1.2)
BUN BLD-MCNC: 15 MG/DL (ref 6–20)
BUN/CREAT SERPL: 13.5 (ref 7–25)
CALCIUM SPEC-SCNC: 9 MG/DL (ref 8.6–10.5)
CHLORIDE SERPL-SCNC: 103 MMOL/L (ref 98–107)
CO2 SERPL-SCNC: 21.6 MMOL/L (ref 22–29)
CREAT BLD-MCNC: 1.11 MG/DL (ref 0.57–1)
DEPRECATED RDW RBC AUTO: 42.3 FL (ref 37–54)
EOSINOPHIL # BLD AUTO: 0 10*3/MM3 (ref 0–0.7)
EOSINOPHIL NFR BLD AUTO: 0 % (ref 0.3–6.2)
ERYTHROCYTE [DISTWIDTH] IN BLOOD BY AUTOMATED COUNT: 14 % (ref 11.7–13)
GFR SERPL CREATININE-BSD FRML MDRD: 65 ML/MIN/1.73
GLOBULIN UR ELPH-MCNC: 3.1 GM/DL
GLUCOSE BLD-MCNC: 118 MG/DL (ref 65–99)
HCT VFR BLD AUTO: 35.5 % (ref 35.6–45.5)
HGB BLD-MCNC: 11.8 G/DL (ref 11.9–15.5)
IMM GRANULOCYTES # BLD: 0.05 10*3/MM3 (ref 0–0.03)
IMM GRANULOCYTES NFR BLD: 0.4 % (ref 0–0.5)
LYMPHOCYTES # BLD AUTO: 1.47 10*3/MM3 (ref 0.9–4.8)
LYMPHOCYTES NFR BLD AUTO: 12.7 % (ref 19.6–45.3)
MAGNESIUM SERPL-MCNC: 2 MG/DL (ref 1.6–2.6)
MCH RBC QN AUTO: 27.8 PG (ref 26.9–32)
MCHC RBC AUTO-ENTMCNC: 33.2 G/DL (ref 32.4–36.3)
MCV RBC AUTO: 83.5 FL (ref 80.5–98.2)
MONOCYTES # BLD AUTO: 0.18 10*3/MM3 (ref 0.2–1.2)
MONOCYTES NFR BLD AUTO: 1.6 % (ref 5–12)
NEUTROPHILS # BLD AUTO: 9.85 10*3/MM3 (ref 1.9–8.1)
NEUTROPHILS NFR BLD AUTO: 85.3 % (ref 42.7–76)
PHOSPHATE SERPL-MCNC: 2.3 MG/DL (ref 2.5–4.5)
PLATELET # BLD AUTO: 432 10*3/MM3 (ref 140–500)
PMV BLD AUTO: 10 FL (ref 6–12)
POTASSIUM BLD-SCNC: 4.6 MMOL/L (ref 3.5–5.2)
PROT SERPL-MCNC: 7.1 G/DL (ref 6–8.5)
RBC # BLD AUTO: 4.25 10*6/MM3 (ref 3.9–5.2)
SODIUM BLD-SCNC: 138 MMOL/L (ref 136–145)
WBC NRBC COR # BLD: 11.55 10*3/MM3 (ref 4.5–10.7)

## 2018-04-19 PROCEDURE — 94799 UNLISTED PULMONARY SVC/PX: CPT

## 2018-04-19 PROCEDURE — 83735 ASSAY OF MAGNESIUM: CPT | Performed by: SURGERY

## 2018-04-19 PROCEDURE — 25010000002 ENOXAPARIN PER 10 MG: Performed by: SURGERY

## 2018-04-19 PROCEDURE — 25010000002 METOCLOPRAMIDE PER 10 MG: Performed by: SURGERY

## 2018-04-19 PROCEDURE — 25010000002 THIAMINE PER 100 MG: Performed by: SURGERY

## 2018-04-19 PROCEDURE — 80053 COMPREHEN METABOLIC PANEL: CPT | Performed by: SURGERY

## 2018-04-19 PROCEDURE — 25010000002 CYANOCOBALAMIN PER 1000 MCG: Performed by: SURGERY

## 2018-04-19 PROCEDURE — 25010000002 KETOROLAC TROMETHAMINE PER 15 MG: Performed by: SURGERY

## 2018-04-19 PROCEDURE — 25010000002 PYRIDOXINE PER 100 MG: Performed by: SURGERY

## 2018-04-19 PROCEDURE — 85025 COMPLETE CBC W/AUTO DIFF WBC: CPT | Performed by: SURGERY

## 2018-04-19 PROCEDURE — 84100 ASSAY OF PHOSPHORUS: CPT | Performed by: SURGERY

## 2018-04-19 RX ORDER — ONDANSETRON 4 MG/1
4 TABLET, FILM COATED ORAL EVERY 8 HOURS PRN
Qty: 25 TABLET | Refills: 0 | Status: SHIPPED | OUTPATIENT
Start: 2018-04-19 | End: 2018-05-23

## 2018-04-19 RX ADMIN — ACETAMINOPHEN 650 MG: 325 TABLET ORAL at 04:31

## 2018-04-19 RX ADMIN — METOCLOPRAMIDE 10 MG: 5 INJECTION, SOLUTION INTRAMUSCULAR; INTRAVENOUS at 00:14

## 2018-04-19 RX ADMIN — FAMOTIDINE 20 MG: 10 INJECTION INTRAVENOUS at 08:12

## 2018-04-19 RX ADMIN — HYOSCYAMINE SULFATE 125 MCG: 0.12 TABLET ORAL at 08:11

## 2018-04-19 RX ADMIN — ALBUTEROL SULFATE 2.5 MG: 2.5 SOLUTION RESPIRATORY (INHALATION) at 06:30

## 2018-04-19 RX ADMIN — CYANOCOBALAMIN 1000 MCG: 1000 INJECTION, SOLUTION INTRAMUSCULAR at 08:12

## 2018-04-19 RX ADMIN — NEBIVOLOL HYDROCHLORIDE 20 MG: 10 TABLET ORAL at 08:11

## 2018-04-19 RX ADMIN — KETOROLAC TROMETHAMINE 30 MG: 30 INJECTION, SOLUTION INTRAMUSCULAR at 08:12

## 2018-04-19 RX ADMIN — ENOXAPARIN SODIUM 40 MG: 40 INJECTION SUBCUTANEOUS at 08:12

## 2018-04-19 RX ADMIN — METOCLOPRAMIDE 10 MG: 5 INJECTION, SOLUTION INTRAMUSCULAR; INTRAVENOUS at 06:45

## 2018-04-19 RX ADMIN — SODIUM CHLORIDE, POTASSIUM CHLORIDE, SODIUM LACTATE AND CALCIUM CHLORIDE 150 ML/HR: 600; 310; 30; 20 INJECTION, SOLUTION INTRAVENOUS at 03:27

## 2018-04-19 NOTE — PLAN OF CARE
Problem: Patient Care Overview  Goal: Plan of Care Review   04/19/18 0654   Coping/Psychosocial   Plan of Care Reviewed With patient   Plan of Care Review   Progress improving   OTHER   Outcome Summary vitals remain stable though blood pressure remains borderline elevated. Patient reports pain is markedly improved after first dose of toradol. offered pain medication, but patient states her pain is not high and she thinks tylenol will control it at this time. patient denies having any symptoms of nausea. bowel sounds present. Patient ambulated 4 times last night and has been using IS machine while awake averaging 2200. Patient remains alert and oriented. compliant with scd's. voiding spontaneously with no indications/symptoms of retention. will continue to monitor.

## 2018-04-19 NOTE — PLAN OF CARE
Problem: Patient Care Overview  Goal: Plan of Care Review  Outcome: Outcome(s) achieved Date Met: 04/19/18 04/19/18 0928   Coping/Psychosocial   Plan of Care Reviewed With patient   Plan of Care Review   Progress improving   OTHER   Outcome Summary No c/o pain or nausea. Tolerating PO liquids. Pt discharging home.     Goal: Individualization and Mutuality  Outcome: Outcome(s) achieved Date Met: 04/19/18    Goal: Discharge Needs Assessment  Outcome: Outcome(s) achieved Date Met: 04/19/18    Goal: Interprofessional Rounds/Family Conf  Outcome: Outcome(s) achieved Date Met: 04/19/18

## 2018-04-19 NOTE — DISCHARGE SUMMARY
"Discharge Summary    Patient name: Shruthi Dockery    Medical record number: [id number]    Admission date: 4/18/2018  Discharge date:      Attending physician: Dr. Christ Sunshine    Primary care physician: José Miguel Truong MD    Referring physician: Christ Sunshine Jr., MD  [address] [address]    Condition on discharge: Stable    Primary Diagnoses:  Morbid obesity with co-morbidities    Operative Procedure:  Lap gastric sleeve     Shruthi HEWITT Overall  is post op day one status post procedure listed. Patient denies shortness of air and lower extremity pain. Feels better than yesterday. No vomiting this am. Ambulating well and using incentive spirometer.          /85 (BP Location: Right arm, Patient Position: Lying)   Pulse 81   Temp 99.8 °F (37.7 °C) (Oral)   Resp 18   Ht 165.1 cm (65\")   Wt 135 kg (296 lb 15.4 oz)   SpO2 94%   BMI 49.42 kg/m²     General:  alert, appears stated age and cooperative   Abdomen: soft, bowel sounds active, appropriate tenderness   Incision:   healing well, no drainage, no erythema, no hernia, no seroma, no swelling, no dehiscence, incision well approximated   Heart: Regular rate   Lungs: Clear to auscultation bilaterally     I reviewed the patient's new clinical results.     Lab Results (last 24 hours)     Procedure Component Value Units Date/Time    Comprehensive Metabolic Panel [[id number]]  (Abnormal) Collected:  04/19/18 0315    Specimen:  Blood Updated:  04/19/18 0447     Glucose 118 (H) mg/dL      BUN 15 mg/dL      Creatinine 1.11 (H) mg/dL      Sodium 138 mmol/L      Potassium 4.6 mmol/L      Chloride 103 mmol/L      CO2 21.6 (L) mmol/L      Calcium 9.0 mg/dL      Total Protein 7.1 g/dL      Albumin 4.00 g/dL      ALT (SGPT) 27 U/L      AST (SGOT) 20 U/L      Alkaline Phosphatase 54 U/L      Total Bilirubin 0.3 mg/dL      eGFR  African Amer 65 mL/min/1.73      Globulin 3.1 gm/dL      A/G Ratio 1.3 g/dL      BUN/Creatinine Ratio 13.5     " Anion Gap 13.4 mmol/L     Phosphorus [[id number]]  (Abnormal) Collected:  04/19/18 0315    Specimen:  Blood Updated:  04/19/18 0444     Phosphorus 2.3 (L) mg/dL     Magnesium [[id number]]  (Normal) Collected:  04/19/18 0315    Specimen:  Blood Updated:  04/19/18 0444     Magnesium 2.0 mg/dL     CBC & Differential [[id number]] Collected:  04/19/18 0315    Specimen:  Blood Updated:  04/19/18 0428    Narrative:       The following orders were created for panel order CBC & Differential.  Procedure                               Abnormality         Status                     ---------                               -----------         ------                     CBC Auto Differential[[id number]]        Abnormal            Final result                 Please view results for these tests on the individual orders.    CBC Auto Differential [[id number]]  (Abnormal) Collected:  04/19/18 0315    Specimen:  Blood Updated:  04/19/18 0428     WBC 11.55 (H) 10*3/mm3      RBC 4.25 10*6/mm3      Hemoglobin 11.8 (L) g/dL      Hematocrit 35.5 (L) %      MCV 83.5 fL      MCH 27.8 pg      MCHC 33.2 g/dL      RDW 14.0 (H) %      RDW-SD 42.3 fl      MPV 10.0 fL      Platelets 432 10*3/mm3      Neutrophil % 85.3 (H) %      Lymphocyte % 12.7 (L) %      Monocyte % 1.6 (L) %      Eosinophil % 0.0 (L) %      Basophil % 0.0 %      Immature Grans % 0.4 %      Neutrophils, Absolute 9.85 (H) 10*3/mm3      Lymphocytes, Absolute 1.47 10*3/mm3      Monocytes, Absolute 0.18 (L) 10*3/mm3      Eosinophils, Absolute 0.00 10*3/mm3      Basophils, Absolute 0.00 10*3/mm3      Immature Grans, Absolute 0.05 (H) 10*3/mm3     Basic Metabolic Panel [[id number]]  (Abnormal) Collected:  04/18/18 1258    Specimen:  Blood Updated:  04/18/18 1353     Glucose 83 mg/dL      BUN 23 (H) mg/dL      Creatinine 1.23 (H) mg/dL      Sodium 137 mmol/L      Potassium 5.0 mmol/L      Chloride 100 mmol/L      CO2 25.4 mmol/L      Calcium 9.3 mg/dL      eGFR  African Amer 57 (L) mL/min/1.73       BUN/Creatinine Ratio 18.7     Anion Gap 11.6 mmol/L     Narrative:       GFR Normal >60  Chronic Kidney Disease <60  Kidney Failure <15             Assessment:      Doing well postoperatively.      Plan:   1. Continue Stage 1 diet  2. Continue with ambulation and Incentive spirometry  3. Plan for d/c home    Patient was seen and examined by Dr. Sunshine.    Hospital Course: The patient is a very pleasant 42 y.o. female that was admitted to the hospital with morbid obesity who underwent above procedure.  Postoperatively the patient was transferred to the bariatric unit per protocol.  Patient remained afebrile and hemodynamically stable.  Patient was up ambulating well and using incentive spirometer.  Postoperatively day 1 patient was started on stage I bariatric diet and continued with good ambulation.  Lovenox was continued.  Patient was then discharged home.        Discharge medications:    Overall, Shruthi HEWITT   Home Medication Instructions MAHIN:[id number]    Printed on:04/19/18 9632   Medication Information                      apixaban (ELIQUIS) 2.5 MG tablet tablet  Take 1 tablet by mouth Every 12 (Twelve) Hours for 30 days. TO BEGIN AFTER SURGERY             folic acid-pyridoxine-cyanocobalamin (FOLBIC) 2.5-25-2 MG tablet tablet  Take 1 tablet by mouth Daily. Begin two weeks before surgery             hydrALAZINE (APRESOLINE) 10 MG tablet  Take 10 mg by mouth 3 (Three) Times a Day.             HYDROcodone-acetaminophen (HYCET) 7.5-325 MG/15ML solution  Take 15 mL by mouth Every 4 (Four) Hours As Needed for Moderate Pain  for up to 3 days.             nebivolol (BYSTOLIC) 20 MG tablet  Take 20 mg by mouth Daily.             ondansetron (ZOFRAN) 4 MG tablet  Take 1 tablet by mouth Every 8 (Eight) Hours As Needed for Nausea or Vomiting.             spironolactone (ALDACTONE) 25 MG tablet  Take 25 mg by mouth Daily.             ursodiol (ACTIGALL) 300 MG capsule  Take 1 capsule by mouth 2 (Two) Times a Day.  BEGIN TWO WEEKS BEFORE SURGERY                 Discharge instructions:  Per Bariatric manual; per our protocol      Follow-up appointment: Follow up with Dr. Sunshine in the office as scheduled.  If not already scheduled call for appointment at [phone].

## 2018-04-20 LAB
CYTO UR: NORMAL
LAB AP CASE REPORT: NORMAL
Lab: NORMAL
PATH REPORT.FINAL DX SPEC: NORMAL
PATH REPORT.GROSS SPEC: NORMAL

## 2018-04-25 ENCOUNTER — OFFICE VISIT (OUTPATIENT)
Dept: BARIATRICS/WEIGHT MGMT | Facility: CLINIC | Age: 43
End: 2018-04-25

## 2018-04-25 VITALS
HEART RATE: 67 BPM | WEIGHT: 283 LBS | BODY MASS INDEX: 47.15 KG/M2 | RESPIRATION RATE: 16 BRPM | TEMPERATURE: 98.4 F | SYSTOLIC BLOOD PRESSURE: 144 MMHG | HEIGHT: 65 IN | DIASTOLIC BLOOD PRESSURE: 84 MMHG

## 2018-04-25 DIAGNOSIS — I10 ESSENTIAL HYPERTENSION: ICD-10-CM

## 2018-04-25 DIAGNOSIS — G47.33 OSA (OBSTRUCTIVE SLEEP APNEA): ICD-10-CM

## 2018-04-25 DIAGNOSIS — E66.01 OBESITY, CLASS III, BMI 40-49.9 (MORBID OBESITY) (HCC): Primary | ICD-10-CM

## 2018-04-25 DIAGNOSIS — R53.82 CHRONIC FATIGUE: ICD-10-CM

## 2018-04-25 PROBLEM — E66.813 OBESITY, CLASS III, BMI 40-49.9 (MORBID OBESITY): Status: ACTIVE | Noted: 2018-04-18

## 2018-04-25 PROCEDURE — 99024 POSTOP FOLLOW-UP VISIT: CPT | Performed by: NURSE PRACTITIONER

## 2018-04-25 NOTE — PROGRESS NOTES
[facility]  [address]  [facility] [address] [phone]  [address]  [facility] [address] [phone]  Dept: [phone]  4/25/2018      Shruthi HEWITT Overall.  [id number]  [id number]  1975  female      Chief Complaint   Patient presents with   • Follow-up     1 week postop [facility] Post-Op Bariatric Surgery:   Shruthi HEWITT Overall is status post laparopscopic Laparoscopic Sleeve procedure, performed on 4/18/18.     HPI:   Today's weight is 128 kg (283 lb) pounds, today's BMI is Body mass index is 47.09 kg/m²., she has a  loss of 17 pounds since the last visit and her weight loss since surgery is 17 pounds. The patient reports a decreased portion size and loss of appetite.  Shruthi HEWITT Overall denies nausea, vomiting, dysphagia, or heartburn. The patient c/o post-op pain that is improving. she is doing well with protein and water intake so far. Taking their vitamins, walking and using IS. Denies fevers, chills, chest pain or shortness of air.     Using BA shakes and planning to start newwhey.      Diet and Exercise: Diet history reviewed and discussed with the patient. Weight loss/gains to date discussed with the patient. No carbonated beverage consumption and exercising regularly- walking frequently.   Supplements: multivitamins, B-12, calcium, iron, B-1 and Vitamin D.     Review of Systems   Constitutional: Positive for appetite change. Negative for fatigue and unexpected weight change.   HENT: Negative.    Eyes: Negative.    Respiratory: Negative.    Cardiovascular: Negative.  Negative for leg swelling.   Gastrointestinal: Negative for abdominal distention, abdominal pain, constipation, diarrhea, nausea and vomiting.   Genitourinary: Negative for difficulty urinating, frequency and urgency.   Musculoskeletal: Negative for back pain.   Skin: Negative.    Psychiatric/Behavioral: Negative.    All other systems reviewed and  are negative.      Patient Active Problem List   Diagnosis   • Chronic fatigue   • Essential hypertension   • Edema   • TERRI (obstructive sleep apnea)   • Dyspepsia   • Obesity, Class III, BMI 40-49.9 (morbid obesity)       The following portions of the patient's history were reviewed and updated as appropriate: allergies, current medications, past family history, past medical history, past social history, past surgical history and problem list.    Vitals:    04/25/18 1005   BP: 144/84   Pulse: 67   Resp: 16   Temp: 98.4 °F (36.9 °C)       Physical Exam   Cardiovascular: Normal rate, regular rhythm, normal heart sounds and intact distal pulses.    Pulmonary/Chest: Effort normal and breath sounds normal. No respiratory distress. She has no wheezes.   Abdominal: Soft. Bowel sounds are normal. She exhibits no distension. There is no tenderness.   Skin: Skin is warm, dry and intact. No rash noted. There is erythema.   Incisions closed without drainage and appear to be healing well.        Assessment:   Post-op, the patient is doing well.     Encounter Diagnoses   Name Primary?   • Obesity, Class III, BMI 40-49.9 (morbid obesity) Yes   • TERRI (obstructive sleep apnea)    • Essential hypertension    • Chronic fatigue        Plan:   Reviewed with patient the importance of following the manual for diet progression. Increase activity as tolerated. Continue increasing daily intake of protein and water.   Return to work: the patient is to return to 3 weeks from their surgery date with no restrictions unless they develop medical problems in which we will see them back in the office. They received a note in our office today with their return to work date.  Activity restrictions: no lifting, pushing or pulling over 25lbs for 3 weeks.   Recommended patient be sure to get at least 70 grams of protein per day. Discussed with the patient the recommended amount of water per day to intake. Reviewed vitamin requirements. Be sure to do  routine exercise and increase activity as tolerated. No asa, nsaids or steroids for 8 weeks. Patient may use miralax as needed if necessary.     Instructions / Recommendations: dietary counseling recommended, recommended a daily protein intake of  grams, vitamin supplement(s) recommended, recommended exercising at least 150 minutes per week, behavior modifications recommended and instructed to call the office for concerns, questions, or problems.     The patient was instructed to follow up at one month follow up appt.     The patient was counseled regarding post op bariatric manual

## 2018-05-03 RX ORDER — APIXABAN 2.5 MG/1
2.5 TABLET, FILM COATED ORAL EVERY 12 HOURS SCHEDULED
Qty: 60 TABLET | Refills: 0 | OUTPATIENT
Start: 2018-05-03 | End: 2018-06-02

## 2018-05-09 RX ORDER — FOLIC ACID-PYRIDOXINE-CYANOCOBALAMIN TAB 2.5-25-2 MG 2.5-25-2 MG
1 TAB ORAL DAILY
Qty: 40 TABLET | Refills: 0 | OUTPATIENT
Start: 2018-05-09

## 2018-05-09 RX ORDER — APIXABAN 2.5 MG/1
2.5 TABLET, FILM COATED ORAL EVERY 12 HOURS SCHEDULED
Qty: 60 TABLET | Refills: 0 | OUTPATIENT
Start: 2018-05-09 | End: 2018-06-08

## 2018-05-12 ENCOUNTER — HOSPITAL ENCOUNTER (OUTPATIENT)
Dept: MRI IMAGING | Facility: HOSPITAL | Age: 43
Discharge: HOME | End: 2018-05-12
Admitting: OPHTHALMOLOGY

## 2018-05-12 ENCOUNTER — HOSPITAL ENCOUNTER (OUTPATIENT)
Dept: MRI IMAGING | Facility: HOSPITAL | Age: 43
Discharge: HOME | End: 2018-05-12

## 2018-05-12 DIAGNOSIS — H47.10 OPTIC DISC EDEMA: ICD-10-CM

## 2018-05-12 PROCEDURE — 0 GADOBENATE DIMEGLUMINE 529 MG/ML SOLUTION: Performed by: OPHTHALMOLOGY

## 2018-05-12 PROCEDURE — 70553 MRI BRAIN STEM W/O & W/DYE: CPT

## 2018-05-12 PROCEDURE — 70543 MRI ORBT/FAC/NCK W/O &W/DYE: CPT

## 2018-05-12 PROCEDURE — A9577 INJ MULTIHANCE: HCPCS | Performed by: OPHTHALMOLOGY

## 2018-05-12 RX ADMIN — GADOBENATE DIMEGLUMINE 20 ML: 529 INJECTION, SOLUTION INTRAVENOUS at 13:05

## 2018-05-14 RX ORDER — FOLIC ACID-PYRIDOXINE-CYANOCOBALAMIN TAB 2.5-25-2 MG 2.5-25-2 MG
1 TAB ORAL DAILY
Qty: 40 TABLET | Refills: 0 | OUTPATIENT
Start: 2018-05-14

## 2018-05-23 ENCOUNTER — OFFICE VISIT (OUTPATIENT)
Dept: BARIATRICS/WEIGHT MGMT | Facility: CLINIC | Age: 43
End: 2018-05-23

## 2018-05-23 VITALS
BODY MASS INDEX: 44.65 KG/M2 | HEART RATE: 52 BPM | RESPIRATION RATE: 16 BRPM | DIASTOLIC BLOOD PRESSURE: 87 MMHG | TEMPERATURE: 98.9 F | SYSTOLIC BLOOD PRESSURE: 147 MMHG | WEIGHT: 268 LBS | HEIGHT: 65 IN

## 2018-05-23 DIAGNOSIS — G47.33 OSA (OBSTRUCTIVE SLEEP APNEA): ICD-10-CM

## 2018-05-23 DIAGNOSIS — E66.01 OBESITY, CLASS III, BMI 40-49.9 (MORBID OBESITY) (HCC): Primary | ICD-10-CM

## 2018-05-23 DIAGNOSIS — R60.0 LOCALIZED EDEMA: ICD-10-CM

## 2018-05-23 DIAGNOSIS — R53.82 CHRONIC FATIGUE: ICD-10-CM

## 2018-05-23 DIAGNOSIS — Z71.3 DIETARY COUNSELING: ICD-10-CM

## 2018-05-23 DIAGNOSIS — I10 ESSENTIAL HYPERTENSION: ICD-10-CM

## 2018-05-23 PROBLEM — R10.13 DYSPEPSIA: Status: RESOLVED | Noted: 2017-12-19 | Resolved: 2018-05-23

## 2018-05-23 PROCEDURE — 99024 POSTOP FOLLOW-UP VISIT: CPT | Performed by: NURSE PRACTITIONER

## 2018-05-23 RX ORDER — OMEPRAZOLE 20 MG/1
20 CAPSULE, DELAYED RELEASE ORAL DAILY
COMMUNITY
End: 2018-10-17

## 2018-05-23 NOTE — PROGRESS NOTES
[facility] BARIATRIC [facility] BARIATRIC SURGERY  [address]  [facility] [address] [phone]  [address]  [facility] [address] [phone]  Dept: [phone]  5/23/2018      Shruthi oDckery.  [id number]  [id number]  1975  female      Chief Complaint   Patient presents with   • Follow-up     1 month postop GS        Post-Op Bariatric Surgery:   Shruthi Dockery is status post Laparoscopic Sleeve procedure, performed on 4/18/18     HPI:   Today's weight is 122 kg (268 lb) pounds, today's BMI is Body mass index is 44.6 kg/m²., she has a  loss of 15 pounds since the last visit and her weight loss since surgery is 32 pounds. The patient reports a decreased portion size and loss of appetite.      Shruthi Dockery denies nausea, vomiting, dysphagia, abdominal pain or heartburn. Tolerating her diet advancement without problems so far.     Diet and Exercise: Diet history reviewed and discussed with the patient. Weight loss/gains to date discussed with the patient. The patient states they are eating 70-80 grams of protein per day. She reports eating 3 meals per day, a typical portion size of 1/2 cup, eating 2 snacks per day, drinking 5+ or more 8-oz. glasses of water per day, no carbonated beverage consumption and exercising regularly- walking 3 times per week.     Supplements: BA MVI with iron and calcium.     Review of Systems   All other systems reviewed and are negative.      Patient Active Problem List   Diagnosis   • Chronic fatigue   • Essential hypertension   • Edema   • TERRI (obstructive sleep apnea)   • Obesity, Class III, BMI 40-49.9 (morbid obesity)   • Dietary counseling       Past Medical History:   Diagnosis Date   • Anemia    • Hemorrhoids    • HTN (hypertension)    • TERRI (obstructive sleep apnea)    • Weight gain        The following portions of the patient's history were reviewed and updated as appropriate: allergies, current medications,  past family history, past medical history, past social history, past surgical history and problem list.    Vitals:    05/23/18 0939   BP: 147/87   Pulse: 52   Resp: 16   Temp: 98.9 °F (37.2 °C)       Physical Exam   Constitutional: She is oriented to person, place, and time. She appears well-developed and well-nourished.   HENT:   Head: Normocephalic and atraumatic.   Eyes: EOM are normal.   Cardiovascular: Normal rate, regular rhythm and normal heart sounds.    Pulmonary/Chest: Effort normal and breath sounds normal.   Abdominal: Soft. Bowel sounds are normal. She exhibits no distension. There is no tenderness.   Musculoskeletal: Normal range of motion.   Neurological: She is alert and oriented to person, place, and time.   Skin: Skin is warm and dry.   Psychiatric: She has a normal mood and affect. Her behavior is normal. Judgment and thought content normal.   Vitals reviewed.      Assessment:   Post-op, the patient is doing well.     Encounter Diagnoses   Name Primary?   • Obesity, Class III, BMI 40-49.9 (morbid obesity) Yes   • Dietary counseling    • Essential hypertension    • TERRI (obstructive sleep apnea)    • Chronic fatigue    • Localized edema        Plan:     Encouraged patient to be sure to get plenty of lean protein per day through small frequent meals all with a protein source. Continue with diet advancement per the manual. Increase exercise as tolerated. Reviewed goal weight and time frame with patient; 12 mo goal 202#. Recommended wean off PPI.  Activity restrictions: none.   Recommended patient be sure to get at least 70 grams of protein per day by eating small, frequent meals all with high lean protein choices. Be sure to limit/cut back on daily carbohydrate intake. Discussed with the patient the recommended amount of water per day to intake- half of body weight in ounces. Reviewed vitamin requirements. Be sure to do routine exercise, 150 minutes per week minimum, including both cardio and strength  training.     Instructions / Recommendations: dietary counseling recommended, recommended a daily protein intake of  grams, vitamin supplement(s) recommended, recommended exercising at least 150 minutes per week, behavior modifications recommended and instructed to call the office for concerns, questions, or problems.     The patient was instructed to follow up in 2 months.     The patient was counseled regarding. Total time spent face to face was 15 minutes and 10 minutes was spent counseling.

## 2018-07-12 ENCOUNTER — LAB (OUTPATIENT)
Dept: LAB | Facility: HOSPITAL | Age: 43
End: 2018-07-12

## 2018-07-12 DIAGNOSIS — E66.01 OBESITY, CLASS III, BMI 40-49.9 (MORBID OBESITY) (HCC): ICD-10-CM

## 2018-07-12 DIAGNOSIS — G47.33 OSA (OBSTRUCTIVE SLEEP APNEA): ICD-10-CM

## 2018-07-12 DIAGNOSIS — I10 ESSENTIAL HYPERTENSION: ICD-10-CM

## 2018-07-12 DIAGNOSIS — R60.0 LOCALIZED EDEMA: ICD-10-CM

## 2018-07-12 DIAGNOSIS — R53.82 CHRONIC FATIGUE: ICD-10-CM

## 2018-07-12 LAB
25(OH)D3 SERPL-MCNC: 35.4 NG/ML (ref 30–100)
ALBUMIN SERPL-MCNC: 4.1 G/DL (ref 3.5–5.2)
ALBUMIN/GLOB SERPL: 1.5 G/DL
ALP SERPL-CCNC: 71 U/L (ref 39–117)
ALT SERPL W P-5'-P-CCNC: 15 U/L (ref 1–33)
ANION GAP SERPL CALCULATED.3IONS-SCNC: 9.9 MMOL/L
AST SERPL-CCNC: 12 U/L (ref 1–32)
BASOPHILS # BLD AUTO: 0.04 10*3/MM3 (ref 0–0.2)
BASOPHILS NFR BLD AUTO: 0.5 % (ref 0–1.5)
BILIRUB SERPL-MCNC: 0.4 MG/DL (ref 0.1–1.2)
BUN BLD-MCNC: 13 MG/DL (ref 6–20)
BUN/CREAT SERPL: 9.5 (ref 7–25)
CALCIUM SPEC-SCNC: 9.6 MG/DL (ref 8.6–10.5)
CHLORIDE SERPL-SCNC: 105 MMOL/L (ref 98–107)
CO2 SERPL-SCNC: 25.1 MMOL/L (ref 22–29)
CREAT BLD-MCNC: 1.37 MG/DL (ref 0.57–1)
DEPRECATED RDW RBC AUTO: 47.3 FL (ref 37–54)
EOSINOPHIL # BLD AUTO: 0.16 10*3/MM3 (ref 0–0.7)
EOSINOPHIL NFR BLD AUTO: 1.9 % (ref 0.3–6.2)
ERYTHROCYTE [DISTWIDTH] IN BLOOD BY AUTOMATED COUNT: 15.3 % (ref 11.7–13)
FERRITIN SERPL-MCNC: 150.2 NG/ML (ref 13–150)
FOLATE SERPL-MCNC: >20 NG/ML (ref 4.78–24.2)
GFR SERPL CREATININE-BSD FRML MDRD: 51 ML/MIN/1.73
GLOBULIN UR ELPH-MCNC: 2.7 GM/DL
GLUCOSE BLD-MCNC: 88 MG/DL (ref 65–99)
HCT VFR BLD AUTO: 36.2 % (ref 35.6–45.5)
HGB BLD-MCNC: 12.3 G/DL (ref 11.9–15.5)
IMM GRANULOCYTES # BLD: 0.01 10*3/MM3 (ref 0–0.03)
IMM GRANULOCYTES NFR BLD: 0.1 % (ref 0–0.5)
IRON 24H UR-MRATE: 43 MCG/DL (ref 37–145)
LYMPHOCYTES # BLD AUTO: 3.87 10*3/MM3 (ref 0.9–4.8)
LYMPHOCYTES NFR BLD AUTO: 45.8 % (ref 19.6–45.3)
MAGNESIUM SERPL-MCNC: 2 MG/DL (ref 1.6–2.6)
MCH RBC QN AUTO: 28.5 PG (ref 26.9–32)
MCHC RBC AUTO-ENTMCNC: 34 G/DL (ref 32.4–36.3)
MCV RBC AUTO: 83.8 FL (ref 80.5–98.2)
MONOCYTES # BLD AUTO: 0.37 10*3/MM3 (ref 0.2–1.2)
MONOCYTES NFR BLD AUTO: 4.4 % (ref 5–12)
NEUTROPHILS # BLD AUTO: 4.01 10*3/MM3 (ref 1.9–8.1)
NEUTROPHILS NFR BLD AUTO: 47.4 % (ref 42.7–76)
PHOSPHATE SERPL-MCNC: 3.2 MG/DL (ref 2.5–4.5)
PLATELET # BLD AUTO: 371 10*3/MM3 (ref 140–500)
PMV BLD AUTO: 10.6 FL (ref 6–12)
POTASSIUM BLD-SCNC: 4.3 MMOL/L (ref 3.5–5.2)
PREALB SERPL-MCNC: 22 MG/DL (ref 20–40)
PROT SERPL-MCNC: 6.8 G/DL (ref 6–8.5)
RBC # BLD AUTO: 4.32 10*6/MM3 (ref 3.9–5.2)
SODIUM BLD-SCNC: 140 MMOL/L (ref 136–145)
WBC NRBC COR # BLD: 8.45 10*3/MM3 (ref 4.5–10.7)

## 2018-07-12 PROCEDURE — 84425 ASSAY OF VITAMIN B-1: CPT

## 2018-07-12 PROCEDURE — 83921 ORGANIC ACID SINGLE QUANT: CPT

## 2018-07-12 PROCEDURE — 82728 ASSAY OF FERRITIN: CPT

## 2018-07-12 PROCEDURE — 80053 COMPREHEN METABOLIC PANEL: CPT

## 2018-07-12 PROCEDURE — 82306 VITAMIN D 25 HYDROXY: CPT

## 2018-07-12 PROCEDURE — 84590 ASSAY OF VITAMIN A: CPT

## 2018-07-12 PROCEDURE — 84134 ASSAY OF PREALBUMIN: CPT

## 2018-07-12 PROCEDURE — 84100 ASSAY OF PHOSPHORUS: CPT

## 2018-07-12 PROCEDURE — 84446 ASSAY OF VITAMIN E: CPT

## 2018-07-12 PROCEDURE — 36415 COLL VENOUS BLD VENIPUNCTURE: CPT

## 2018-07-12 PROCEDURE — 83735 ASSAY OF MAGNESIUM: CPT

## 2018-07-12 PROCEDURE — 83540 ASSAY OF IRON: CPT

## 2018-07-12 PROCEDURE — 84597 ASSAY OF VITAMIN K: CPT

## 2018-07-12 PROCEDURE — 85025 COMPLETE CBC W/AUTO DIFF WBC: CPT

## 2018-07-12 PROCEDURE — 84630 ASSAY OF ZINC: CPT

## 2018-07-12 PROCEDURE — 82746 ASSAY OF FOLIC ACID SERUM: CPT

## 2018-07-14 LAB — ZINC SERPL-MCNC: 114 UG/DL (ref 56–134)

## 2018-07-17 ENCOUNTER — OFFICE VISIT (OUTPATIENT)
Dept: BARIATRICS/WEIGHT MGMT | Facility: CLINIC | Age: 43
End: 2018-07-17

## 2018-07-17 VITALS
DIASTOLIC BLOOD PRESSURE: 75 MMHG | HEIGHT: 65 IN | SYSTOLIC BLOOD PRESSURE: 148 MMHG | BODY MASS INDEX: 42.32 KG/M2 | WEIGHT: 254 LBS | RESPIRATION RATE: 16 BRPM | TEMPERATURE: 98.5 F | HEART RATE: 48 BPM

## 2018-07-17 DIAGNOSIS — E66.01 OBESITY, CLASS III, BMI 40-49.9 (MORBID OBESITY) (HCC): Primary | ICD-10-CM

## 2018-07-17 DIAGNOSIS — G47.33 OSA (OBSTRUCTIVE SLEEP APNEA): ICD-10-CM

## 2018-07-17 DIAGNOSIS — Z71.3 DIETARY COUNSELING: ICD-10-CM

## 2018-07-17 DIAGNOSIS — I10 ESSENTIAL HYPERTENSION: ICD-10-CM

## 2018-07-17 DIAGNOSIS — R53.82 CHRONIC FATIGUE: ICD-10-CM

## 2018-07-17 LAB
Lab: NORMAL
METHYLMALONATE SERPL-SCNC: 94 NMOL/L (ref 0–378)

## 2018-07-17 PROCEDURE — 99024 POSTOP FOLLOW-UP VISIT: CPT | Performed by: NURSE PRACTITIONER

## 2018-07-17 NOTE — PROGRESS NOTES
[facility] BARIATRIC [facility] BARIATRIC SURGERY  [address]  [facility] [id number] [phone]  [address]  [facility] [id number] [phone]  Dept: [phone]  7/17/2018      Shruthi Dockery.  [id number]  [id number]  1975  female      Chief Complaint   Patient presents with   • Follow-up     3 month  follow up        Post-Op Bariatric Surgery:   Shruthi Dockery is status post Laparoscopic Sleeve procedure, performed on 4/18/18     HPI:   Today's weight is 115 kg (254 lb) pounds, today's BMI is Body mass index is 42.27 kg/m²., she has a  loss of 14 pounds since the last visit and her weight loss since surgery is 47 pounds. The patient reports a decreased portion size and loss of appetite.      Shruthi Dockery denies nausea, vomiting, dysphagia, abdominal pain or heartburn.     Diet and Exercise: Diet history reviewed and discussed with the patient. Weight loss/gains to date discussed with the patient. The patient states they are eating 40-60 grams of protein per day. She reports eating 3 meals per day, a typical portion size of 1/2-1 cup, eating 2 snacks per day, drinking 5-6 or more 8-oz. glasses of water per day, no carbonated beverage consumption and exercising regularly- crossfit 4 days per week.    She reports dizziness related to sinus pressure. Only notices when she is lying. She rarely does protein supplements but does feel that she gets plenty of protein throughout the day.     Supplements: BA MTV with iron and calcium.     Review of Systems   Constitutional: Positive for appetite change. Negative for fatigue and unexpected weight change.   HENT: Negative.    Eyes: Negative.    Respiratory: Negative.    Cardiovascular: Negative.  Negative for leg swelling.   Gastrointestinal: Negative for abdominal distention, abdominal pain, constipation, diarrhea, nausea and vomiting.   Genitourinary: Negative for difficulty urinating, frequency and  urgency.   Musculoskeletal: Negative for back pain.   Skin: Negative.    Neurological: Positive for dizziness.   Psychiatric/Behavioral: Negative.    All other systems reviewed and are negative.      Patient Active Problem List   Diagnosis   • Chronic fatigue   • Essential hypertension   • Edema   • TERRI (obstructive sleep apnea)   • Obesity, Class III, BMI 40-49.9 (morbid obesity) (CMS/[facility])   • Dietary counseling       Past Medical History:   Diagnosis Date   • Anemia    • Hemorrhoids    • HTN (hypertension)    • TERRI (obstructive sleep apnea)    • Weight gain        The following portions of the patient's history were reviewed and updated as appropriate: allergies, current medications, past family history, past medical history, past social history, past surgical history and problem list.    Vitals:    07/17/18 1035   BP: 148/75   Pulse: (!) 48   Resp: 16   Temp: 98.5 °F (36.9 °C)       Physical Exam   Constitutional: She appears well-developed and well-nourished.   Neck: No thyromegaly present.   Cardiovascular: Normal rate, regular rhythm and normal heart sounds.    Pulmonary/Chest: Effort normal and breath sounds normal. No respiratory distress. She has no wheezes.   Abdominal: Soft. Bowel sounds are normal. She exhibits no distension. There is no tenderness. There is no guarding. No hernia.   Musculoskeletal: She exhibits no edema or tenderness.   Neurological: She is alert.   Skin: Skin is warm and dry. No rash noted. No erythema.   Psychiatric: She has a normal mood and affect. Her behavior is normal.   Nursing note and vitals reviewed.      Assessment:   Post-op, the patient is doing well.     Encounter Diagnoses   Name Primary?   • Obesity, Class III, BMI 40-49.9 (morbid obesity) (CMS/[facility]) Yes   • TERRI (obstructive sleep apnea)    • Essential hypertension    • Chronic fatigue    • Dietary counseling        Plan:   Encouraged to focus on solid protein intake and high fiber foods to help keep her satisfied  throughout the day and prevent dips in her blood sugar as well as dizziness. Encouarged to keep up the great work with exercise, and fluid intake.  Encouraged patient to be sure to get plenty of lean protein per day through small frequent meals all with a protein source.   Activity restrictions: none.   Recommended patient be sure to get at least 70 grams of protein per day by eating small, frequent meals all with high lean protein choices. Be sure to limit/cut back on daily carbohydrate intake. Discussed with the patient the recommended amount of water per day to intake- half of body weight in ounces. Reviewed vitamin requirements. Be sure to do routine exercise, 150 minutes per week minimum, including both cardio and strength training.     Instructions / Recommendations: dietary counseling recommended, recommended a daily protein intake of  grams, vitamin supplement(s) recommended, recommended exercising at least 150 minutes per week, behavior modifications recommended and instructed to call the office for concerns, questions, or problems.     The patient was instructed to follow up in 3 months .     The patient was counseled regarding exercise, protein intake, fluid intake, follow up. Total time spent face to face was 20 minutes and 15 minutes was spent counseling.

## 2018-07-17 NOTE — PATIENT INSTRUCTIONS
.The patient was instructed to follow our dietary recommendations regarding a high lean protein, low carb and low fat diet. Reviewed appropriate amount of water to intake daily. Be sure to take vitamins as recommended. Patient was instructed to get at least 150 minutes of exercise weekly including both cardio and strength training.

## 2018-07-18 LAB — VIT B1 BLD-SCNC: 103.3 NMOL/L (ref 66.5–200)

## 2018-07-21 LAB
A-TOCOPHEROL VIT E SERPL-MCNC: 6.7 MG/L (ref 7–25.1)
GAMMA TOCOPHEROL SERPL-MCNC: 2.1 MG/L (ref 0.5–5.5)
VIT A SERPL-MCNC: 43.7 UG/DL (ref 33.1–100)

## 2018-07-23 LAB — PHYTONADIONE SERPL-MCNC: 0.65 NG/ML (ref 0.13–1.88)

## 2018-10-17 ENCOUNTER — OFFICE VISIT (OUTPATIENT)
Dept: BARIATRICS/WEIGHT MGMT | Facility: CLINIC | Age: 43
End: 2018-10-17

## 2018-10-17 VITALS
HEART RATE: 64 BPM | RESPIRATION RATE: 16 BRPM | SYSTOLIC BLOOD PRESSURE: 165 MMHG | DIASTOLIC BLOOD PRESSURE: 92 MMHG | HEIGHT: 65 IN | TEMPERATURE: 98.5 F | BODY MASS INDEX: 37.9 KG/M2 | WEIGHT: 227.5 LBS

## 2018-10-17 DIAGNOSIS — G47.33 OSA (OBSTRUCTIVE SLEEP APNEA): ICD-10-CM

## 2018-10-17 DIAGNOSIS — R53.82 CHRONIC FATIGUE: ICD-10-CM

## 2018-10-17 DIAGNOSIS — I10 ESSENTIAL HYPERTENSION: ICD-10-CM

## 2018-10-17 DIAGNOSIS — Z71.3 DIETARY COUNSELING: ICD-10-CM

## 2018-10-17 DIAGNOSIS — E66.9 OBESITY, CLASS II, BMI 35-39.9: Primary | ICD-10-CM

## 2018-10-17 DIAGNOSIS — Z71.82 EXERCISE COUNSELING: ICD-10-CM

## 2018-10-17 PROBLEM — E66.812 OBESITY, CLASS II, BMI 35-39.9: Status: ACTIVE | Noted: 2018-10-17

## 2018-10-17 PROBLEM — E66.813 OBESITY, CLASS III, BMI 40-49.9 (MORBID OBESITY): Status: RESOLVED | Noted: 2018-04-18 | Resolved: 2018-10-17

## 2018-10-17 PROBLEM — E66.01 OBESITY, CLASS III, BMI 40-49.9 (MORBID OBESITY) (HCC): Status: RESOLVED | Noted: 2018-04-18 | Resolved: 2018-10-17

## 2018-10-17 PROCEDURE — 99213 OFFICE O/P EST LOW 20 MIN: CPT | Performed by: NURSE PRACTITIONER

## 2018-10-17 NOTE — PROGRESS NOTES
[facility] BARIATRIC [facility] BARIATRIC SURGERY  [address]  [facility] [address] [phone]  [address]  [facility] [address] [phone]  Dept: [phone]  10/17/2018      Shruthi Dockery.  [id number]  [id number]  1975  female      Chief Complaint   Patient presents with   • Follow-up     6 month sleeve follow up       BH Post-Op Bariatric Surgery:   Shruthi Dockery is status post Laparoscopic Sleeve procedure, performed on 4/18/18     HPI:   Today's weight is 103 kg (227 lb 8 oz) pounds, today's BMI is Body mass index is 37.86 kg/m²., she has a  loss of 26.5 pounds since the last visit and her weight loss since surgery is 76.5 pounds. The patient reports a decreased portion size and loss of appetite.      Shruthi Dockery denies nausea, vomiting, dysphagia, abdominal pain or heartburn.     Diet and Exercise: Diet history reviewed and discussed with the patient. Weight loss/gains to date discussed with the patient. The patient states they are eating 70+ grams of protein per day. She reports eating 3 meals per day, a typical portion size of 1/2-1 cup, eating 2 snacks per day, drinking 5+ or more 8-oz. glasses of water per day, no carbonated beverage consumption and exercising regularly- crossfit 4 times per week.     Supplements: Bariatric MVI with iron and calcium.     Review of Systems   All other systems reviewed and are negative.      Patient Active Problem List   Diagnosis   • Chronic fatigue   • Essential hypertension   • Edema   • TERRI (obstructive sleep apnea)   • Dietary counseling   • Obesity, Class II, BMI 35-39.9   • Exercise counseling       Past Medical History:   Diagnosis Date   • Anemia    • Hemorrhoids    • HTN (hypertension)    • TERRI (obstructive sleep apnea)    • Weight gain        The following portions of the patient's history were reviewed and updated as appropriate: allergies, current medications, past family history, past  medical history, past social history, past surgical history and problem list.    Vitals:    10/17/18 1022   BP: 165/92   Pulse: 64   Resp: 16   Temp: 98.5 °F (36.9 °C)       Physical Exam   Constitutional: She is oriented to person, place, and time. She appears well-developed and well-nourished.   HENT:   Head: Normocephalic and atraumatic.   Eyes: EOM are normal.   Cardiovascular: Normal rate, regular rhythm and normal heart sounds.    Pulmonary/Chest: Effort normal and breath sounds normal.   Abdominal: Soft. Bowel sounds are normal. She exhibits no distension. There is no tenderness.   Musculoskeletal: Normal range of motion.   Neurological: She is alert and oriented to person, place, and time.   Skin: Skin is warm and dry.   Psychiatric: She has a normal mood and affect. Her behavior is normal. Judgment and thought content normal.   Vitals reviewed.      Assessment:   Post-op, the patient is doing well.     Encounter Diagnoses   Name Primary?   • Obesity, Class II, BMI 35-39.9 Yes   • Dietary counseling    • Exercise counseling    • Essential hypertension    • TERRI (obstructive sleep apnea)    • Chronic fatigue        Plan:     Encouraged patient to be sure to get plenty of lean protein per day through small frequent meals all with a protein source. Keep up the great work with vitamin, water and exercise. Reviewed goals.  Activity restrictions: none.   Recommended patient be sure to get at least 70 grams of protein per day by eating small, frequent meals all with high lean protein choices. Be sure to limit/cut back on daily carbohydrate intake. Discussed with the patient the recommended amount of water per day to intake- half of body weight in ounces. Reviewed vitamin requirements. Be sure to do routine exercise, 150 minutes per week minimum, including both cardio and strength training.     Instructions / Recommendations: dietary counseling recommended, recommended a daily protein intake of  grams, vitamin  supplement(s) recommended, recommended exercising at least 150 minutes per week, behavior modifications recommended and instructed to call the office for concerns, questions, or problems.     The patient was instructed to follow up in 3 months.     The patient was counseled regarding.

## 2019-01-25 ENCOUNTER — OFFICE VISIT (OUTPATIENT)
Dept: BARIATRICS/WEIGHT MGMT | Facility: CLINIC | Age: 44
End: 2019-01-25

## 2019-01-25 VITALS
RESPIRATION RATE: 18 BRPM | WEIGHT: 218 LBS | HEIGHT: 65 IN | BODY MASS INDEX: 36.32 KG/M2 | DIASTOLIC BLOOD PRESSURE: 74 MMHG | HEART RATE: 67 BPM | TEMPERATURE: 98.6 F | SYSTOLIC BLOOD PRESSURE: 124 MMHG

## 2019-01-25 DIAGNOSIS — Z71.3 DIETARY COUNSELING: ICD-10-CM

## 2019-01-25 DIAGNOSIS — Z71.82 EXERCISE COUNSELING: ICD-10-CM

## 2019-01-25 DIAGNOSIS — E66.9 OBESITY, CLASS II, BMI 35-39.9: Primary | ICD-10-CM

## 2019-01-25 PROCEDURE — 99213 OFFICE O/P EST LOW 20 MIN: CPT | Performed by: NURSE PRACTITIONER

## 2019-01-25 RX ORDER — MULTIPLE VITAMINS W/ MINERALS TAB 9MG-400MCG
1 TAB ORAL DAILY
COMMUNITY
End: 2020-10-12

## 2019-01-25 NOTE — PROGRESS NOTES
[facility] BARIATRIC [facility] BARIATRIC SURGERY  [address]  [facility] [address] [phone]  [address]  [facility] [address] [phone]  Dept: [phone]  1/25/2019      Shruthi HEWITT Overall.  [id number]  [id number]  1975  female      Chief Complaint   Patient presents with   • Follow-up     9 month sleeve follow up       BH Post-Op Bariatric Surgery:   Shruthi HEWITT Overall is status post Laparoscopic Sleeve procedure, performed on 4/18/18     HPI:   Today's weight is 98.9 kg (218 lb) pounds, today's BMI is Body mass index is 36.28 kg/m²., she has a  loss of 9.5 pounds since the last visit and her weight loss since surgery is 86 pounds. The patient reports a decreased portion size and loss of appetite.      Shruthi HEWITT Overall denies nausea, vomiting, dysphagia, abdominal pain or heartburn.     Diet and Exercise: Diet history reviewed and discussed with the patient. Weight loss/gains to date discussed with the patient. The patient states they are eating 70 grams of protein per day. She reports eating 3 meals per day, a typical portion size of 1 cup, eating 2 snacks per day, drinking 5+ or more 8-oz. glasses of water per day, no carbonated beverage consumption and exercising regularly- crossfit 4 times a week/.     Doing great with her choices, portion, plenty of protein and exercise.    Supplements: bariatric advantage per sleeve.     Review of Systems   All other systems reviewed and are negative.      Patient Active Problem List   Diagnosis   • Chronic fatigue   • Essential hypertension   • Edema   • TERRI (obstructive sleep apnea)   • Dietary counseling   • Obesity, Class II, BMI 35-39.9   • Exercise counseling       Past Medical History:   Diagnosis Date   • Anemia    • Hemorrhoids    • HTN (hypertension)    • TERRI (obstructive sleep apnea)    • Weight gain        The following portions of the patient's history were reviewed and updated as  appropriate: allergies, current medications, past family history, past medical history, past social history, past surgical history and problem list.    Vitals:    01/25/19 1035   BP: 124/74   Pulse: 67   Resp: 18   Temp: 98.6 °F (37 °C)       Physical Exam   Constitutional: She is oriented to person, place, and time. She appears well-developed and well-nourished.   HENT:   Head: Normocephalic and atraumatic.   Eyes: EOM are normal.   Cardiovascular: Normal rate, regular rhythm and normal heart sounds.   Pulmonary/Chest: Effort normal and breath sounds normal.   Abdominal: Soft. Bowel sounds are normal. She exhibits no distension. There is no tenderness.   Musculoskeletal: Normal range of motion.   Neurological: She is alert and oriented to person, place, and time.   Skin: Skin is warm and dry.   Psychiatric: She has a normal mood and affect. Her behavior is normal. Judgment and thought content normal.   Vitals reviewed.      Assessment:   Post-op, the patient is doing well.     Encounter Diagnoses   Name Primary?   • Obesity, Class II, BMI 35-39.9 Yes   • Dietary counseling    • Exercise counseling        Plan:     Encouraged patient to be sure to get plenty of lean protein per day through small frequent meals all with a protein source. Keep up the great work with dietary choices, habits, protein, water and exercise.  Activity restrictions: none.   Recommended patient be sure to get at least 70 grams of protein per day by eating small, frequent meals all with high lean protein choices. Be sure to limit/cut back on daily carbohydrate intake. Discussed with the patient the recommended amount of water per day to intake- half of body weight in ounces. Reviewed vitamin requirements. Be sure to do routine exercise, 150 minutes per week minimum, including both cardio and strength training.     Instructions / Recommendations: dietary counseling recommended, recommended a daily protein intake of  grams, vitamin  supplement(s) recommended, recommended exercising at least 150 minutes per week, behavior modifications recommended and instructed to call the office for concerns, questions, or problems.     The patient was instructed to follow up in 3 months.     The patient was counseled regarding. Total time spent face to face was 15 minutes and 10 minutes was spent counseling.

## 2019-09-09 ENCOUNTER — TELEPHONE (OUTPATIENT)
Dept: OTHER | Facility: OTHER | Age: 44
End: 2019-09-09

## 2019-09-09 DIAGNOSIS — I10 ESSENTIAL HYPERTENSION: Primary | ICD-10-CM

## 2019-09-09 RX ORDER — NEBIVOLOL 20 MG/1
20 TABLET ORAL DAILY
Qty: 30 TABLET | Refills: 3 | Status: SHIPPED | OUTPATIENT
Start: 2019-09-09 | End: 2019-09-12 | Stop reason: SDUPTHER

## 2019-09-09 NOTE — TELEPHONE ENCOUNTER
Patient states that her pharmacy has been trying to reach the office for a refill for about a week. She is completely out of her Bystolic and is needing a refill sent to [facility] pharmacy on file. Please advise.

## 2019-09-10 NOTE — TELEPHONE ENCOUNTER
Pt is completely out of medication since last Tues and has already received 3 loaner pills.    Prophylactic measure

## 2019-09-12 DIAGNOSIS — I10 ESSENTIAL HYPERTENSION: ICD-10-CM

## 2019-09-12 RX ORDER — NEBIVOLOL 20 MG/1
20 TABLET ORAL DAILY
Qty: 30 TABLET | Refills: 3 | Status: SHIPPED | OUTPATIENT
Start: 2019-09-12 | End: 2019-12-08 | Stop reason: SDUPTHER

## 2019-09-12 RX ORDER — NEBIVOLOL 20 MG/1
20 TABLET ORAL DAILY
Qty: 30 TABLET | Refills: 3 | Status: SHIPPED | OUTPATIENT
Start: 2019-09-12 | End: 2019-09-12 | Stop reason: SDUPTHER

## 2019-10-01 RX ORDER — SPIRONOLACTONE 50 MG/1
50 TABLET, FILM COATED ORAL DAILY
Start: 2019-10-01 | End: 2020-04-30 | Stop reason: SDUPTHER

## 2019-10-03 ENCOUNTER — OFFICE VISIT (OUTPATIENT)
Dept: FAMILY MEDICINE CLINIC | Facility: CLINIC | Age: 44
End: 2019-10-03

## 2019-10-03 VITALS
SYSTOLIC BLOOD PRESSURE: 130 MMHG | WEIGHT: 210.6 LBS | OXYGEN SATURATION: 99 % | HEART RATE: 60 BPM | BODY MASS INDEX: 35.09 KG/M2 | DIASTOLIC BLOOD PRESSURE: 80 MMHG | HEIGHT: 65 IN | TEMPERATURE: 98.3 F

## 2019-10-03 DIAGNOSIS — D50.9 IRON DEFICIENCY ANEMIA, UNSPECIFIED IRON DEFICIENCY ANEMIA TYPE: ICD-10-CM

## 2019-10-03 DIAGNOSIS — J30.1 SEASONAL ALLERGIC RHINITIS DUE TO POLLEN: ICD-10-CM

## 2019-10-03 DIAGNOSIS — E66.01 CLASS 2 SEVERE OBESITY DUE TO EXCESS CALORIES WITH SERIOUS COMORBIDITY AND BODY MASS INDEX (BMI) OF 35.0 TO 35.9 IN ADULT (HCC): ICD-10-CM

## 2019-10-03 DIAGNOSIS — I10 ESSENTIAL HYPERTENSION: ICD-10-CM

## 2019-10-03 DIAGNOSIS — I10 BENIGN ESSENTIAL HYPERTENSION: Primary | ICD-10-CM

## 2019-10-03 DIAGNOSIS — K90.9 INTESTINAL MALABSORPTION, UNSPECIFIED TYPE: ICD-10-CM

## 2019-10-03 PROBLEM — K90.49 MALABSORPTION DUE TO INTOLERANCE, NOT ELSEWHERE CLASSIFIED: Status: ACTIVE | Noted: 2019-10-03

## 2019-10-03 PROCEDURE — 90471 IMMUNIZATION ADMIN: CPT | Performed by: FAMILY MEDICINE

## 2019-10-03 PROCEDURE — 90686 IIV4 VACC NO PRSV 0.5 ML IM: CPT | Performed by: FAMILY MEDICINE

## 2019-10-03 PROCEDURE — 99214 OFFICE O/P EST MOD 30 MIN: CPT | Performed by: FAMILY MEDICINE

## 2019-10-03 NOTE — PROGRESS NOTES
Chief Complaint   Patient presents with   • Hypertension       Wale Dockery is a 43 y.o. female.     History of Present Illness   F/U obesity .  Down 99 lbs from 4/8/18.  Doing cross fit 4 days a week.  Seeing Dr. Sunshine.  F/U HTN.   NO orthostasis.  Doing well with meds.    F?u anemia.  On MVI with iron once a day. Hx of hysterectom.    Mammo 3/19 utd.     The following portions of the patient's history were reviewed and updated as appropriate: allergies, current medications, past family history, past medical history, past social history, past surgical history and problem list.    Review of Systems   Constitutional: Negative for appetite change and fatigue.   HENT: Negative for nosebleeds and sore throat.    Eyes: Negative for blurred vision and visual disturbance.   Respiratory: Negative for shortness of breath and wheezing.    Cardiovascular: Negative for chest pain and leg swelling.   Gastrointestinal: Negative for abdominal distention and abdominal pain.   Endocrine: Negative for cold intolerance and polyuria.   Genitourinary: Negative for dysuria and hematuria.   Musculoskeletal: Negative for arthralgias and myalgias.   Skin: Negative for color change and rash.   Neurological: Negative for weakness and confusion.   Psychiatric/Behavioral: Negative for agitation and depressed mood.       Patient Active Problem List   Diagnosis   • Chronic fatigue   • Essential hypertension   • Edema   • TERRI (obstructive sleep apnea)   • Dietary counseling   • Obesity, Class II, BMI 35-39.9   • Exercise counseling   • Allergic rhinitis   • Anemia   • Benign essential hypertension   • BPPV (benign paroxysmal positional vertigo)   • Hyperglycemia   • Obstructive sleep apnea syndrome   • Wheezing   • Obesity   • Palpitations   • Hx of bariatric surgery   • Menorrhagia   • Malabsorption due to intolerance, not elsewhere classified       Allergies   Allergen Reactions   • Lisinopril-Hydrochlorothiazide Cough   •  Penicillins Hives   • Skelaxin [Metaxalone] Hives   • Sulfa Antibiotics Hives         Current Outpatient Medications:   •  Multiple Vitamins-Minerals (MULTIVITAMIN WITH MINERALS) tablet tablet, Take 1 tablet by mouth Daily., Disp: , Rfl:   •  nebivolol (BYSTOLIC) 20 MG tablet, Take 1 tablet by mouth Daily., Disp: 30 tablet, Rfl: 3  •  spironolactone (ALDACTONE) 50 MG tablet, Take 1 tablet by mouth Daily., Disp: , Rfl:     Past Medical History:   Diagnosis Date   • Abnormal uterine bleeding    • Acute foot pain, left    • Allergic rhinitis    • Anemia    • Ankle joint pain    • Bacterial vaginosis    • Benign essential hypertension    • BMI 50.0-59.9, adult (CMS/[facility])    • BPPV (benign paroxysmal positional vertigo)    • Daytime hypersomnolence    • Edema    • Hemorrhoids    • History of blood transfusion    • History of chickenpox    • Hx of bariatric surgery    • Hyperglycemia    • Hypocalcemia    • Irregular menstrual cycle    • Irritability and anger    • Macromastia    • Menorrhagia    • Obesity    • Obstructive sleep apnea syndrome    • Optic nerve swelling    • Palpitations    • Sleep difficulties    • Snoring    • Swelling    • Swelling of both ankles    • Trapezius muscle spasm    • Weight gain    • Wheezing        Past Surgical History:   Procedure Laterality Date   • GASTRIC SLEEVE LAPAROSCOPIC N/A 4/18/2018    Procedure: GASTRIC SLEEVE LAPAROSCOPIC;  Surgeon: Christ Sunshine Jr., MD;  Location: [facility];  Service: Bariatric   • TUBAL ABDOMINAL LIGATION     • VAGINAL HYSTERECTOMY         Family History   Problem Relation Age of Onset   • Obesity Mother    • Hypertension Mother    • Stroke Mother    • Coronary artery disease Mother    • Sleep apnea Mother    • Hypertension Sister    • Hypertension Maternal Grandfather    • Malig Hyperthermia Neg Hx        Social History     Tobacco Use   • Smoking status: Never Smoker   • Smokeless tobacco: Never Used   Substance Use Topics   • Alcohol use: No             Objective     Vitals:    10/03/19 1315   BP: 130/80   Pulse: 60   Temp: 98.3 °F (36.8 °C)   SpO2: 99%     Body mass index is 35.05 kg/m².    Physical Exam   Constitutional: She is oriented to person, place, and time. She appears well-developed and well-nourished.   HENT:   Head: Normocephalic and atraumatic.   Right Ear: External ear normal.   Left Ear: External ear normal.   Nose: Nose normal.   Mouth/Throat: Oropharynx is clear and moist.   Eyes: Conjunctivae and EOM are normal. Pupils are equal, round, and reactive to light.   Neck: Normal range of motion. Neck supple. No tracheal deviation present. No thyromegaly present.   Cardiovascular: Normal rate, regular rhythm and normal heart sounds. Exam reveals no gallop and no friction rub.   No murmur heard.  Pulmonary/Chest: Effort normal and breath sounds normal. No respiratory distress. She exhibits no tenderness.   Abdominal: Soft. Bowel sounds are normal. She exhibits no distension. There is no tenderness.   Musculoskeletal: Normal range of motion. She exhibits no edema or tenderness.   Lymphadenopathy:     She has no cervical adenopathy.   Neurological: She is alert and oriented to person, place, and time. She displays normal reflexes. No cranial nerve deficit or sensory deficit. Coordination normal.   Skin: Skin is warm and dry.   Psychiatric: She has a normal mood and affect. Her behavior is normal. Judgment and thought content normal.   Nursing note and vitals reviewed.      Lab Results   Component Value Date    GLUCOSE 88 07/12/2018    BUN 13 07/12/2018    CREATININE 1.37 (H) 07/12/2018    EGFRIFAFRI 51 (L) 07/12/2018    BCR 9.5 07/12/2018    K 4.3 07/12/2018    CO2 25.1 07/12/2018    CALCIUM 9.6 07/12/2018    ALBUMIN 4.10 07/12/2018    AST 12 07/12/2018    ALT 15 07/12/2018       WBC   Date Value Ref Range Status   07/12/2018 8.45 4.50 - 10.70 10*3/mm3 Final     RBC   Date Value Ref Range Status   07/12/2018 4.32 3.90 - 5.20 10*6/mm3 Final      Hemoglobin   Date Value Ref Range Status   07/12/2018 12.3 11.9 - 15.5 g/dL Final     Hematocrit   Date Value Ref Range Status   07/12/2018 36.2 35.6 - 45.5 % Final     MCV   Date Value Ref Range Status   07/12/2018 83.8 80.5 - 98.2 fL Final     MCH   Date Value Ref Range Status   07/12/2018 28.5 26.9 - 32.0 pg Final     MCHC   Date Value Ref Range Status   07/12/2018 34.0 32.4 - 36.3 g/dL Final     RDW   Date Value Ref Range Status   07/12/2018 15.3 (H) 11.7 - 13.0 % Final     RDW-SD   Date Value Ref Range Status   07/12/2018 47.3 37.0 - 54.0 fl Final     MPV   Date Value Ref Range Status   07/12/2018 10.6 6.0 - 12.0 fL Final     Platelets   Date Value Ref Range Status   07/12/2018 371 140 - 500 10*3/mm3 Final     Neutrophil %   Date Value Ref Range Status   07/12/2018 47.4 42.7 - 76.0 % Final     Lymphocyte %   Date Value Ref Range Status   07/12/2018 45.8 (H) 19.6 - 45.3 % Final     Monocyte %   Date Value Ref Range Status   07/12/2018 4.4 (L) 5.0 - 12.0 % Final     Eosinophil %   Date Value Ref Range Status   07/12/2018 1.9 0.3 - 6.2 % Final     Basophil %   Date Value Ref Range Status   07/12/2018 0.5 0.0 - 1.5 % Final     Immature Grans %   Date Value Ref Range Status   07/12/2018 0.1 0.0 - 0.5 % Final     Neutrophils, Absolute   Date Value Ref Range Status   07/12/2018 4.01 1.90 - 8.10 10*3/mm3 Final     Lymphocytes, Absolute   Date Value Ref Range Status   07/12/2018 3.87 0.90 - 4.80 10*3/mm3 Final     Monocytes, Absolute   Date Value Ref Range Status   07/12/2018 0.37 0.20 - 1.20 10*3/mm3 Final     Eosinophils, Absolute   Date Value Ref Range Status   07/12/2018 0.16 0.00 - 0.70 10*3/mm3 Final     Basophils, Absolute   Date Value Ref Range Status   07/12/2018 0.04 0.00 - 0.20 10*3/mm3 Final     Immature Grans, Absolute   Date Value Ref Range Status   07/12/2018 0.01 0.00 - 0.03 10*3/mm3 Final       Lab Results   Component Value Date    HGBA1C 6.40 (H) 12/19/2017       No results found for:  [id number]    TSH   Date Value Ref Range Status   12/19/2017 0.915 0.270 - 4.200 mIU/mL Final       Lab Results   Component Value Date    CHOL 150 12/19/2017     Lab Results   Component Value Date    TRIG 114 12/19/2017     Lab Results   Component Value Date    HDL 35 (L) 12/19/2017     Lab Results   Component Value Date    LDL 92 12/19/2017     Lab Results   Component Value Date    VLDL 22.8 12/19/2017     Lab Results   Component Value Date    LDLHDL 2.63 12/19/2017         Procedures    Assessment/Plan   Problems Addressed this Visit        Cardiovascular and Mediastinum    Essential hypertension    Relevant Medications    spironolactone (ALDACTONE) 50 MG tablet    Other Relevant Orders    Lipid Panel With / Chol / HDL Ratio    Benign essential hypertension - Primary    Relevant Medications    spironolactone (ALDACTONE) 50 MG tablet    Other Relevant Orders    Lipid Panel With / Chol / HDL Ratio       Respiratory    Allergic rhinitis       Digestive    Obesity       Hematopoietic and Hemostatic    Anemia    Relevant Orders    CBC & Differential    Ferritin    Iron      Other Visit Diagnoses     Intestinal malabsorption, unspecified type        Relevant Orders    CBC & Differential    Ferritin    Iron    Comprehensive Metabolic Panel    Lipid Panel With / Chol / HDL Ratio    Methylmalonic Acid, Serum          Orders Placed This Encounter   Procedures   • Fluarix/Fluzone/Afluria Quad>6 Months   • Ferritin   • Iron   • Comprehensive Metabolic Panel   • Lipid Panel With / Chol / HDL Ratio   • Methylmalonic Acid, Serum   • CBC & Differential     Order Specific Question:   Manual Differential     Answer:   No       Current Outpatient Medications   Medication Sig Dispense Refill   • Multiple Vitamins-Minerals (MULTIVITAMIN WITH MINERALS) tablet tablet Take 1 tablet by mouth Daily.     • nebivolol (BYSTOLIC) 20 MG tablet Take 1 tablet by mouth Daily. 30 tablet 3   • spironolactone (ALDACTONE) 50 MG tablet Take 1 tablet by  mouth Daily.       No current facility-administered medications for this visit.        Shruthi Dockery had no medications administered during this visit.    Return in about 6 months (around 4/3/2020).    There are no Patient Instructions on file for this visit.

## 2019-10-04 ENCOUNTER — OFFICE VISIT (OUTPATIENT)
Dept: BARIATRICS/WEIGHT MGMT | Facility: CLINIC | Age: 44
End: 2019-10-04

## 2019-10-04 VITALS
SYSTOLIC BLOOD PRESSURE: 121 MMHG | TEMPERATURE: 97.3 F | WEIGHT: 209 LBS | BODY MASS INDEX: 34.82 KG/M2 | DIASTOLIC BLOOD PRESSURE: 70 MMHG | RESPIRATION RATE: 18 BRPM | HEIGHT: 65 IN | HEART RATE: 48 BPM

## 2019-10-04 DIAGNOSIS — Z98.84 S/P LAPAROSCOPIC SLEEVE GASTRECTOMY: ICD-10-CM

## 2019-10-04 DIAGNOSIS — E66.9 OBESITY, CLASS I, BMI 30-34.9: Primary | ICD-10-CM

## 2019-10-04 DIAGNOSIS — Z71.3 DIETARY COUNSELING: ICD-10-CM

## 2019-10-04 PROBLEM — E66.812 OBESITY, CLASS II, BMI 35-39.9: Status: RESOLVED | Noted: 2018-10-17 | Resolved: 2019-10-04

## 2019-10-04 PROBLEM — E66.811 OBESITY, CLASS I, BMI 30-34.9: Status: ACTIVE | Noted: 2019-10-04

## 2019-10-04 PROCEDURE — 99213 OFFICE O/P EST LOW 20 MIN: CPT | Performed by: NURSE PRACTITIONER

## 2019-10-04 NOTE — PROGRESS NOTES
[facility]  [address] [address] [phone]  [address] [address] [phone]  Dept: [phone]  10/4/2019      Shruthi Dockery.  [id number]  [id number]  1975  female      Chief Complaint   Patient presents with   • Follow-up     1.5 yr sleeve       BH Post-Op Bariatric Surgery:   Shruthi Dockery is status post Laparoscopic Sleeve procedure, performed on 4/18/18     HPI:   Today's weight is 94.8 kg (209 lb) pounds, today's BMI is Body mass index is 34.78 kg/m²., she has a  loss of 9 pounds since the last visit and her weight loss since surgery is 91 pounds. The patient reports a decreased portion size and loss of appetite.      Shruthi Dockery denies N/v/d/regurg/reflux/pain.      Diet and Exercise: Diet history reviewed and discussed with the patient. Weight loss/gains to date discussed with the patient. The patient states they are eating 70+ grams of protein per day. She reports eating 3 meals per day, a typical portion size of 1 cup, eating 2 snacks per day, drinking 5+ or more 8-oz. glasses of water per day, no carbonated beverage consumption and exercising regularly- crossfit 4 times a week.     Supplements: BA MVI with iron.     Review of Systems   All other systems reviewed and are negative.      Patient Active Problem List   Diagnosis   • Chronic fatigue   • Edema   • TERRI (obstructive sleep apnea)   • Dietary counseling   • Allergic rhinitis   • Anemia   • Benign essential hypertension   • BPPV (benign paroxysmal positional vertigo)   • Hyperglycemia   • Wheezing   • Palpitations   • Obesity, Class I, BMI 30-34.9   • S/P laparoscopic sleeve gastrectomy       Past Medical History:   Diagnosis Date   • Abnormal uterine bleeding    • Acute foot pain, left    • Allergic rhinitis    • Anemia    • Ankle joint pain    • Bacterial vaginosis    • Benign essential hypertension    • BMI  50.0-59.9, adult (CMS/[facility])    • BPPV (benign paroxysmal positional vertigo)    • Daytime hypersomnolence    • Edema    • Hemorrhoids    • History of blood transfusion    • History of chickenpox    • Hx of bariatric surgery    • Hyperglycemia    • Hypocalcemia    • Irregular menstrual cycle    • Irritability and anger    • Macromastia    • Menorrhagia    • Obesity    • Obstructive sleep apnea syndrome    • Optic nerve swelling    • Palpitations    • Sleep difficulties    • Snoring    • Swelling    • Swelling of both ankles    • Trapezius muscle spasm    • Weight gain    • Wheezing        The following portions of the patient's history were reviewed and updated as appropriate: allergies, current medications, past family history, past medical history, past social history, past surgical history and problem list.    Vitals:    10/04/19 1107   BP: 121/70   Pulse: (!) 48   Resp: 18   Temp: 97.3 °F (36.3 °C)       Physical Exam   Constitutional: She is oriented to person, place, and time. She appears well-developed and well-nourished.   HENT:   Head: Normocephalic and atraumatic.   Eyes: EOM are normal.   Cardiovascular: Normal rate, regular rhythm and normal heart sounds.   Pulmonary/Chest: Effort normal and breath sounds normal.   Abdominal: Soft. Bowel sounds are normal. She exhibits no distension. There is no tenderness.   Musculoskeletal: Normal range of motion.   Neurological: She is alert and oriented to person, place, and time.   Skin: Skin is warm and dry.   Psychiatric: She has a normal mood and affect. Her behavior is normal. Judgment and thought content normal.   Vitals reviewed.      Assessment:   Post-op, the patient is doing well.     Encounter Diagnoses   Name Primary?   • Obesity, Class I, BMI 30-34.9 Yes   • Dietary counseling    • S/P laparoscopic sleeve gastrectomy        Plan:     Encouraged patient to be sure to get plenty of lean protein per day through small frequent meals all with a protein source.  Patient doing well and feels great; reviewed her long term goals. Keep up with healthier dietary choices along with plenty of water, vitamins and exercise. PCP ordered her labs yesterday so we will watch for those.  Activity restrictions: none.   Recommended patient be sure to get at least 70 grams of protein per day by eating small, frequent meals all with high lean protein choices. Be sure to limit/cut back on daily carbohydrate intake. Discussed with the patient the recommended amount of water per day to intake- half of body weight in ounces. Reviewed vitamin requirements. Be sure to do routine exercise, 150 minutes per week minimum, including both cardio and strength training.     Instructions / Recommendations: dietary counseling recommended, recommended a daily protein intake of  grams, vitamin supplement(s) recommended, recommended exercising at least 150 minutes per week, behavior modifications recommended and instructed to call the office for concerns, questions, or problems.     The patient was instructed to follow up 6 months.     The patient was counseled regarding. Total time spent face to face was 20 minutes and 15 minutes was spent counseling.

## 2019-10-06 LAB
ALBUMIN SERPL-MCNC: 4.2 G/DL (ref 3.5–5.2)
ALBUMIN/GLOB SERPL: 1.8 G/DL
ALP SERPL-CCNC: 59 U/L (ref 39–117)
ALT SERPL-CCNC: 9 U/L (ref 1–33)
AST SERPL-CCNC: 12 U/L (ref 1–32)
BASOPHILS # BLD AUTO: (no result) 10*3/UL
BASOPHILS # BLD MANUAL: 0.08 10*3/MM3 (ref 0–0.2)
BASOPHILS NFR BLD MANUAL: 1 % (ref 0–1.5)
BILIRUB SERPL-MCNC: 0.5 MG/DL (ref 0.2–1.2)
BUN SERPL-MCNC: 12 MG/DL (ref 6–20)
BUN/CREAT SERPL: 10.4 (ref 7–25)
CALCIUM SERPL-MCNC: 9.4 MG/DL (ref 8.6–10.5)
CHLORIDE SERPL-SCNC: 104 MMOL/L (ref 98–107)
CHOLEST SERPL-MCNC: 136 MG/DL (ref 0–200)
CHOLEST/HDLC SERPL: 2.89 {RATIO}
CO2 SERPL-SCNC: 27.6 MMOL/L (ref 22–29)
CREAT SERPL-MCNC: 1.15 MG/DL (ref 0.57–1)
DIFFERENTIAL COMMENT: ABNORMAL
EOSINOPHIL # BLD AUTO: (no result) 10*3/UL
EOSINOPHIL # BLD MANUAL: 0.08 10*3/MM3 (ref 0–0.4)
EOSINOPHIL NFR BLD AUTO: (no result) %
EOSINOPHIL NFR BLD MANUAL: 1 % (ref 0.3–6.2)
ERYTHROCYTE [DISTWIDTH] IN BLOOD BY AUTOMATED COUNT: 13.6 % (ref 12.3–15.4)
FERRITIN SERPL-MCNC: 120 NG/ML (ref 13–150)
GLOBULIN SER CALC-MCNC: 2.3 GM/DL
GLUCOSE SERPL-MCNC: 79 MG/DL (ref 65–99)
HCT VFR BLD AUTO: 40.3 % (ref 34–46.6)
HDLC SERPL-MCNC: 47 MG/DL (ref 40–60)
HGB BLD-MCNC: 13.2 G/DL (ref 12–15.9)
IRON SERPL-MCNC: 79 MCG/DL (ref 37–145)
LDLC SERPL CALC-MCNC: 70 MG/DL (ref 0–100)
LYMPHOCYTES # BLD AUTO: (no result) 10*3/UL
LYMPHOCYTES # BLD MANUAL: 4.65 10*3/MM3 (ref 0.7–3.1)
LYMPHOCYTES NFR BLD AUTO: (no result) %
LYMPHOCYTES NFR BLD MANUAL: 55.6 % (ref 19.6–45.3)
Lab: NORMAL
MCH RBC QN AUTO: 29.6 PG (ref 26.6–33)
MCHC RBC AUTO-ENTMCNC: 32.8 G/DL (ref 31.5–35.7)
MCV RBC AUTO: 90.4 FL (ref 79–97)
METHYLMALONATE SERPL-SCNC: 112 NMOL/L (ref 0–378)
MONOCYTES # BLD MANUAL: 0.17 10*3/MM3 (ref 0.1–0.9)
MONOCYTES NFR BLD AUTO: (no result) %
MONOCYTES NFR BLD MANUAL: 2 % (ref 5–12)
NEUTROPHILS # BLD MANUAL: 3.38 10*3/MM3 (ref 1.7–7)
NEUTROPHILS NFR BLD AUTO: (no result) %
NEUTROPHILS NFR BLD MANUAL: 40.4 % (ref 42.7–76)
PLATELET # BLD AUTO: 327 10*3/MM3 (ref 140–450)
PLATELET BLD QL SMEAR: ABNORMAL
POTASSIUM SERPL-SCNC: 4.9 MMOL/L (ref 3.5–5.2)
PROT SERPL-MCNC: 6.5 G/DL (ref 6–8.5)
RBC # BLD AUTO: 4.46 10*6/MM3 (ref 3.77–5.28)
RBC MORPH BLD: ABNORMAL
SODIUM SERPL-SCNC: 139 MMOL/L (ref 136–145)
TRIGL SERPL-MCNC: 94 MG/DL (ref 0–150)
VLDLC SERPL CALC-MCNC: 18.8 MG/DL
WBC # BLD AUTO: 8.37 10*3/MM3 (ref 3.4–10.8)

## 2019-12-08 DIAGNOSIS — I10 ESSENTIAL HYPERTENSION: ICD-10-CM

## 2019-12-09 RX ORDER — NEBIVOLOL HYDROCHLORIDE 20 MG/1
TABLET ORAL
Qty: 90 TABLET | Refills: 0 | Status: SHIPPED | OUTPATIENT
Start: 2019-12-09 | End: 2020-03-10 | Stop reason: SDUPTHER

## 2020-03-10 ENCOUNTER — OFFICE VISIT (OUTPATIENT)
Dept: FAMILY MEDICINE CLINIC | Facility: CLINIC | Age: 45
End: 2020-03-10

## 2020-03-10 VITALS
TEMPERATURE: 98.2 F | HEART RATE: 55 BPM | OXYGEN SATURATION: 98 % | WEIGHT: 219 LBS | HEIGHT: 65 IN | SYSTOLIC BLOOD PRESSURE: 121 MMHG | BODY MASS INDEX: 36.49 KG/M2 | DIASTOLIC BLOOD PRESSURE: 56 MMHG

## 2020-03-10 DIAGNOSIS — R00.2 PALPITATIONS: ICD-10-CM

## 2020-03-10 DIAGNOSIS — J06.9 VIRAL UPPER RESPIRATORY TRACT INFECTION: ICD-10-CM

## 2020-03-10 DIAGNOSIS — I10 BENIGN ESSENTIAL HYPERTENSION: Primary | ICD-10-CM

## 2020-03-10 DIAGNOSIS — I10 ESSENTIAL HYPERTENSION: ICD-10-CM

## 2020-03-10 PROCEDURE — 99214 OFFICE O/P EST MOD 30 MIN: CPT | Performed by: FAMILY MEDICINE

## 2020-03-10 RX ORDER — NEBIVOLOL 5 MG/1
5 TABLET ORAL DAILY
Qty: 90 TABLET | Refills: 3 | Status: SHIPPED | OUTPATIENT
Start: 2020-03-10 | End: 2021-02-22

## 2020-03-10 NOTE — PROGRESS NOTES
Chief Complaint   Patient presents with   • Cough   • URI       Subjective   Shruthi HEWITT Overall is a 44 y.o. female.     History of Present Illness   C/o cough for 5 days.  Started with ST.  Post nasal drip.  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    F/U HTN/palpitations.  On BP meds.  Occ feels lightheaded at times.      The following portions of the patient's history were reviewed and updated as appropriate: allergies, current medications, past family history, past medical history, past social history, past surgical history and problem list.    Review of Systems   Constitutional: Negative for appetite change and fatigue.   HENT: Positive for rhinorrhea. Negative for nosebleeds and sore throat.    Eyes: Negative for blurred vision and visual disturbance.   Respiratory: Positive for cough. Negative for shortness of breath and wheezing.    Cardiovascular: Negative for chest pain and leg swelling.   Gastrointestinal: Negative for abdominal distention and abdominal pain.   Endocrine: Negative for cold intolerance and polyuria.   Genitourinary: Negative for dysuria and hematuria.   Musculoskeletal: Negative for arthralgias and myalgias.   Skin: Negative for color change and rash.   Neurological: Negative for weakness and confusion.   Psychiatric/Behavioral: Negative for agitation and depressed mood.       Patient Active Problem List   Diagnosis   • Chronic fatigue   • Edema   • TERRI (obstructive sleep apnea)   • Dietary counseling   • Allergic rhinitis   • Anemia   • Benign essential hypertension   • BPPV (benign paroxysmal positional vertigo)   • Hyperglycemia   • Wheezing   • Palpitations   • Obesity, Class I, BMI 30-34.9   • S/P laparoscopic sleeve gastrectomy   • Upper respiratory infection       Allergies   Allergen Reactions   • Lisinopril-Hydrochlorothiazide Cough   • Penicillins Hives   • Skelaxin [Metaxalone] Hives   • Sulfa Antibiotics Hives         Current Outpatient Medications:   •  Multiple Vitamins-Minerals  (MULTIVITAMIN WITH MINERALS) tablet tablet, Take 1 tablet by mouth Daily., Disp: , Rfl:   •  nebivolol (BYSTOLIC) 5 MG tablet, Take 1 tablet by mouth Daily., Disp: 90 tablet, Rfl: 3  •  spironolactone (ALDACTONE) 50 MG tablet, Take 1 tablet by mouth Daily., Disp: , Rfl:     Past Medical History:   Diagnosis Date   • Abnormal uterine bleeding    • Acute foot pain, left    • Allergic rhinitis    • Anemia    • Ankle joint pain    • Bacterial vaginosis    • Benign essential hypertension    • BMI 50.0-59.9, adult (CMS/HCC)    • BPPV (benign paroxysmal positional vertigo)    • Daytime hypersomnolence    • Edema    • Hemorrhoids    • History of blood transfusion    • History of chickenpox    • Hx of bariatric surgery    • Hyperglycemia    • Hypocalcemia    • Irregular menstrual cycle    • Irritability and anger    • Macromastia    • Menorrhagia    • Obesity    • Obstructive sleep apnea syndrome    • Optic nerve swelling    • Palpitations    • Sleep difficulties    • Snoring    • Swelling    • Swelling of both ankles    • Trapezius muscle spasm    • Weight gain    • Wheezing        Past Surgical History:   Procedure Laterality Date   • GASTRIC SLEEVE LAPAROSCOPIC N/A 4/18/2018    Procedure: GASTRIC SLEEVE LAPAROSCOPIC;  Surgeon: Christ Sunshine Jr., MD;  Location: [facility];  Service: Bariatric   • TUBAL ABDOMINAL LIGATION     • VAGINAL HYSTERECTOMY         Family History   Problem Relation Age of Onset   • Obesity Mother    • Hypertension Mother    • Stroke Mother    • Coronary artery disease Mother    • Sleep apnea Mother    • Hypertension Sister    • Hypertension Maternal Grandfather    • Malig Hyperthermia Neg Hx        Social History     Tobacco Use   • Smoking status: Never Smoker   • Smokeless tobacco: Never Used   Substance Use Topics   • Alcohol use: No            Objective     Vitals:    03/10/20 1535   BP: 121/56   Pulse: 55   Temp: 98.2 °F (36.8 °C)   SpO2: 98%     Body mass index is 36.44  kg/m².    Physical Exam   Constitutional: She appears well-developed and well-nourished.   HENT:   Head: Normocephalic and atraumatic.   Mouth/Throat: Oropharynx is clear and moist.   Eyes: Pupils are equal, round, and reactive to light. No scleral icterus.   Neck: No thyromegaly present.   Cardiovascular: Normal rate and regular rhythm. Exam reveals no gallop and no friction rub.   No murmur heard.  Pulmonary/Chest: Effort normal. No respiratory distress. She has no wheezes. She has no rales. She exhibits no tenderness.   Abdominal: Soft. Bowel sounds are normal. She exhibits no distension. There is no tenderness.   Musculoskeletal: Normal range of motion. She exhibits no edema or deformity.   Lymphadenopathy:     She has no cervical adenopathy.   Neurological: No cranial nerve deficit. She exhibits normal muscle tone.   Skin: Skin is warm and dry. No rash noted. She is not diaphoretic.   Vitals reviewed.      Lab Results   Component Value Date    GLUCOSE 88 07/12/2018    BUN 12 10/03/2019    CREATININE 1.15 (H) 10/03/2019    EGFRIFNONA 51 (L) 10/03/2019    EGFRIFAFRI 62 10/03/2019    BCR 10.4 10/03/2019    K 4.9 10/03/2019    CO2 27.6 10/03/2019    CALCIUM 9.4 10/03/2019    PROTENTOTREF 6.5 10/03/2019    ALBUMIN 4.20 10/03/2019    LABIL2 1.8 10/03/2019    AST 12 10/03/2019    ALT 9 10/03/2019       WBC   Date Value Ref Range Status   10/03/2019 8.37 3.40 - 10.80 10*3/mm3 Final     RBC   Date Value Ref Range Status   10/03/2019 4.46 3.77 - 5.28 10*6/mm3 Final     Hemoglobin   Date Value Ref Range Status   10/03/2019 13.2 12.0 - 15.9 g/dL Final   07/12/2018 12.3 11.9 - 15.5 g/dL Final     Hematocrit   Date Value Ref Range Status   10/03/2019 40.3 34.0 - 46.6 % Final   07/12/2018 36.2 35.6 - 45.5 % Final     MCV   Date Value Ref Range Status   10/03/2019 90.4 79.0 - 97.0 fL Final   07/12/2018 83.8 80.5 - 98.2 fL Final     MCH   Date Value Ref Range Status   10/03/2019 29.6 26.6 - 33.0 pg Final   07/12/2018 28.5 26.9  - 32.0 pg Final     MCHC   Date Value Ref Range Status   10/03/2019 32.8 31.5 - 35.7 g/dL Final   07/12/2018 34.0 32.4 - 36.3 g/dL Final     RDW   Date Value Ref Range Status   10/03/2019 13.6 12.3 - 15.4 % Final   07/12/2018 15.3 (H) 11.7 - 13.0 % Final     RDW-SD   Date Value Ref Range Status   07/12/2018 47.3 37.0 - 54.0 fl Final     MPV   Date Value Ref Range Status   07/12/2018 10.6 6.0 - 12.0 fL Final     Platelets   Date Value Ref Range Status   10/03/2019 327 140 - 450 10*3/mm3 Final   07/12/2018 371 140 - 500 10*3/mm3 Final     Neutrophil Rel %   Date Value Ref Range Status   10/03/2019 CANCELED       Comment:     Test not performed    Result canceled by the ancillary.       Neutrophil %   Date Value Ref Range Status   07/12/2018 47.4 42.7 - 76.0 % Final     Lymphocyte Rel %   Date Value Ref Range Status   10/03/2019 CANCELED       Comment:     Test not performed    Result canceled by the ancillary.       Lymphocyte %   Date Value Ref Range Status   07/12/2018 45.8 (H) 19.6 - 45.3 % Final     Monocyte Rel %   Date Value Ref Range Status   10/03/2019 CANCELED       Comment:     Test not performed    Result canceled by the ancillary.       Monocyte %   Date Value Ref Range Status   07/12/2018 4.4 (L) 5.0 - 12.0 % Final     Eosinophil Rel %   Date Value Ref Range Status   10/03/2019 CANCELED       Comment:     Test not performed    Result canceled by the ancillary.     10/03/2019 1.0 0.3 - 6.2 % Final     Eosinophil %   Date Value Ref Range Status   07/12/2018 1.9 0.3 - 6.2 % Final     Basophil %   Date Value Ref Range Status   07/12/2018 0.5 0.0 - 1.5 % Final     Immature Grans %   Date Value Ref Range Status   07/12/2018 0.1 0.0 - 0.5 % Final     Neutrophils Absolute   Date Value Ref Range Status   10/03/2019 3.38 1.70 - 7.00 10*3/mm3 Final     Neutrophils, Absolute   Date Value Ref Range Status   07/12/2018 4.01 1.90 - 8.10 10*3/mm3 Final     Lymphocytes Absolute   Date Value Ref Range Status   10/03/2019  CANCELED       Comment:     Test not performed    Result canceled by the ancillary.     10/03/2019 4.65 (H) 0.70 - 3.10 10*3/mm3 Final     Lymphocytes, Absolute   Date Value Ref Range Status   07/12/2018 3.87 0.90 - 4.80 10*3/mm3 Final     Monocytes Absolute   Date Value Ref Range Status   10/03/2019 0.17 0.10 - 0.90 10*3/mm3 Final     Monocytes, Absolute   Date Value Ref Range Status   07/12/2018 0.37 0.20 - 1.20 10*3/mm3 Final     Eosinophils Absolute   Date Value Ref Range Status   10/03/2019 CANCELED       Comment:     Test not performed    Result canceled by the ancillary.       Eosinophil Abs   Date Value Ref Range Status   10/03/2019 0.08 0.00 - 0.40 10*3/mm3 Final     Eosinophils, Absolute   Date Value Ref Range Status   07/12/2018 0.16 0.00 - 0.70 10*3/mm3 Final     Basophils Absolute   Date Value Ref Range Status   10/03/2019 CANCELED       Comment:     Test not performed    Result canceled by the ancillary.     10/03/2019 0.08 0.00 - 0.20 10*3/mm3 Final     Basophils, Absolute   Date Value Ref Range Status   07/12/2018 0.04 0.00 - 0.20 10*3/mm3 Final     Immature Grans, Absolute   Date Value Ref Range Status   07/12/2018 0.01 0.00 - 0.03 10*3/mm3 Final       Lab Results   Component Value Date    HGBA1C 6.40 (H) 12/19/2017       No results found for: [id number]    TSH   Date Value Ref Range Status   12/19/2017 0.915 0.270 - 4.200 mIU/mL Final       Lab Results   Component Value Date    CHOL 150 12/19/2017     Lab Results   Component Value Date    TRIG 94 10/03/2019     Lab Results   Component Value Date    HDL 47 10/03/2019     Lab Results   Component Value Date    LDL 70 10/03/2019     Lab Results   Component Value Date    VLDL 18.8 10/03/2019     Lab Results   Component Value Date    LDLHDL 2.63 12/19/2017         Procedures    Assessment/Plan   Problems Addressed this Visit        Cardiovascular and Mediastinum    Benign essential hypertension - Primary    Relevant Medications    nebivolol (BYSTOLIC) 5  MG tablet    Palpitations       Respiratory    Upper respiratory infection      Other Visit Diagnoses     Essential hypertension        Relevant Medications    nebivolol (BYSTOLIC) 5 MG tablet      BP with orthostatic symptoms.  Decrease bystolic to 5 mg a day  Delsym prn and zyrtec prn.  Off work 3/9-3/13/20.    No orders of the defined types were placed in this encounter.      Current Outpatient Medications   Medication Sig Dispense Refill   • Multiple Vitamins-Minerals (MULTIVITAMIN WITH MINERALS) tablet tablet Take 1 tablet by mouth Daily.     • nebivolol (BYSTOLIC) 5 MG tablet Take 1 tablet by mouth Daily. 90 tablet 3   • spironolactone (ALDACTONE) 50 MG tablet Take 1 tablet by mouth Daily.       No current facility-administered medications for this visit.        Shruthi Dockery had no medications administered during this visit.    Return in about 3 months (around 6/10/2020).    There are no Patient Instructions on file for this visit.

## 2020-04-30 RX ORDER — SPIRONOLACTONE 50 MG/1
50 TABLET, FILM COATED ORAL DAILY
Qty: 90 TABLET | Refills: 1 | Status: SHIPPED | OUTPATIENT
Start: 2020-04-30 | End: 2020-08-26

## 2020-08-25 VITALS
WEIGHT: 216 LBS | OXYGEN SATURATION: 100 % | DIASTOLIC BLOOD PRESSURE: 62 MMHG | SYSTOLIC BLOOD PRESSURE: 150 MMHG | SYSTOLIC BLOOD PRESSURE: 147 MMHG | SYSTOLIC BLOOD PRESSURE: 127 MMHG | HEART RATE: 61 BPM | DIASTOLIC BLOOD PRESSURE: 63 MMHG | HEART RATE: 56 BPM | SYSTOLIC BLOOD PRESSURE: 129 MMHG | TEMPERATURE: 97.5 F | SYSTOLIC BLOOD PRESSURE: 140 MMHG | RESPIRATION RATE: 8 BRPM | RESPIRATION RATE: 13 BRPM | DIASTOLIC BLOOD PRESSURE: 71 MMHG | TEMPERATURE: 97.4 F | HEART RATE: 65 BPM | HEART RATE: 70 BPM | HEART RATE: 50 BPM | HEART RATE: 57 BPM | SYSTOLIC BLOOD PRESSURE: 115 MMHG | DIASTOLIC BLOOD PRESSURE: 80 MMHG | DIASTOLIC BLOOD PRESSURE: 98 MMHG | HEIGHT: 65 IN | HEART RATE: 64 BPM | RESPIRATION RATE: 20 BRPM | OXYGEN SATURATION: 98 % | RESPIRATION RATE: 12 BRPM | RESPIRATION RATE: 16 BRPM

## 2020-08-26 ENCOUNTER — OFFICE VISIT (OUTPATIENT)
Dept: FAMILY MEDICINE CLINIC | Facility: CLINIC | Age: 45
End: 2020-08-26

## 2020-08-26 VITALS
HEIGHT: 65 IN | DIASTOLIC BLOOD PRESSURE: 84 MMHG | BODY MASS INDEX: 36.75 KG/M2 | TEMPERATURE: 97.8 F | WEIGHT: 220.6 LBS | HEART RATE: 54 BPM | OXYGEN SATURATION: 99 % | SYSTOLIC BLOOD PRESSURE: 126 MMHG

## 2020-08-26 DIAGNOSIS — Z00.00 ENCOUNTER FOR ANNUAL HEALTH EXAMINATION: Primary | ICD-10-CM

## 2020-08-26 DIAGNOSIS — I10 BENIGN ESSENTIAL HYPERTENSION: ICD-10-CM

## 2020-08-26 DIAGNOSIS — R73.9 HYPERGLYCEMIA: ICD-10-CM

## 2020-08-26 DIAGNOSIS — D50.9 IRON DEFICIENCY ANEMIA, UNSPECIFIED IRON DEFICIENCY ANEMIA TYPE: ICD-10-CM

## 2020-08-26 PROCEDURE — 90715 TDAP VACCINE 7 YRS/> IM: CPT | Performed by: FAMILY MEDICINE

## 2020-08-26 PROCEDURE — 90471 IMMUNIZATION ADMIN: CPT | Performed by: FAMILY MEDICINE

## 2020-08-26 PROCEDURE — 99396 PREV VISIT EST AGE 40-64: CPT | Performed by: FAMILY MEDICINE

## 2020-08-26 NOTE — PROGRESS NOTES
Patient here for annual physical exam  F/U HTN.    Subjective   Shruthi HEWITT Overall is a 44 y.o. female.     History of Present Illness   44 year old AA female here for annual.    The following portions of the patient's history were reviewed and updated as appropriate: allergies, current medications, past family history, past medical history, past social history, past surgical history and problem list  Denstist:  UTD.    Last colonoscopy:Upcoming next week.     Optometry: UTD.    Last PSA(if applicable):  Last mammo(if applicable):Normal 8/20.     Pap:  Normal 8/20    F/U HTN.  Spironolactone makes Short of breath.  It is better if I take half of it but still there.    FU obesity.  Lost 85 lbs since gastric sleeve.  I am exercising regulalry.  Doing crossfit 4 times a week.      FU anemia.  Has had hysterectomy.    Immunization History   Administered Date(s) Administered   • Flu Vaccine Intradermal Quad 18-64YR 11/19/2018   • Flulaval/Fluarix Quad 10/03/2019   • Hepatitis A 05/03/2018, 11/19/2018       Review of Systems   Constitutional: Negative for appetite change and fatigue.   HENT: Negative for nosebleeds and sore throat.    Eyes: Negative for blurred vision and visual disturbance.   Respiratory: Positive for shortness of breath. Negative for wheezing.    Cardiovascular: Negative for chest pain and leg swelling.   Gastrointestinal: Negative for abdominal distention and abdominal pain.   Endocrine: Negative for cold intolerance and polyuria.   Genitourinary: Negative for dysuria and hematuria.   Musculoskeletal: Negative for arthralgias and myalgias.   Skin: Negative for color change and rash.   Neurological: Negative for weakness and confusion.   Psychiatric/Behavioral: Negative for agitation and depressed mood.       Patient Active Problem List   Diagnosis   • Chronic fatigue   • Edema   • TERRI (obstructive sleep apnea)   • Dietary counseling   • Allergic rhinitis   • Anemia   • Benign essential hypertension    • BPPV (benign paroxysmal positional vertigo)   • Hyperglycemia   • Wheezing   • Palpitations   • Obesity, Class I, BMI 30-34.9   • S/P laparoscopic sleeve gastrectomy   • Upper respiratory infection   • Encounter for annual health examination       Allergies   Allergen Reactions   • Lisinopril-Hydrochlorothiazide Cough   • Penicillins Hives   • Skelaxin [Metaxalone] Hives   • Sulfa Antibiotics Hives         Current Outpatient Medications:   •  Multiple Vitamins-Minerals (MULTIVITAMIN WITH MINERALS) tablet tablet, Take 1 tablet by mouth Daily., Disp: , Rfl:   •  nebivolol (BYSTOLIC) 5 MG tablet, Take 1 tablet by mouth Daily., Disp: 90 tablet, Rfl: 3    Past Medical History:   Diagnosis Date   • Abnormal uterine bleeding    • Acute foot pain, left    • Allergic rhinitis    • Anemia    • Ankle joint pain    • Bacterial vaginosis    • Benign essential hypertension    • BMI 50.0-59.9, adult (CMS/HCC)    • BPPV (benign paroxysmal positional vertigo)    • Daytime hypersomnolence    • Edema    • Hemorrhoids    • History of blood transfusion    • History of chickenpox    • Hx of bariatric surgery    • Hyperglycemia    • Hypocalcemia    • Irregular menstrual cycle    • Irritability and anger    • Macromastia    • Menorrhagia    • Obesity    • Obstructive sleep apnea syndrome    • Optic nerve swelling    • Palpitations    • Sleep difficulties    • Snoring    • Swelling    • Swelling of both ankles    • Trapezius muscle spasm    • Weight gain    • Wheezing        Past Surgical History:   Procedure Laterality Date   • GASTRIC SLEEVE LAPAROSCOPIC N/A 4/18/2018    Procedure: GASTRIC SLEEVE LAPAROSCOPIC;  Surgeon: Christ Sunshine Jr., MD;  Location: [facility];  Service: Bariatric   • TUBAL ABDOMINAL LIGATION     • VAGINAL HYSTERECTOMY         Family History   Problem Relation Age of Onset   • Obesity Mother    • Hypertension Mother    • Stroke Mother    • Coronary artery disease Mother    • Sleep apnea Mother    •  Hypertension Sister    • Hypertension Maternal Grandfather    • Malig Hyperthermia Neg Hx        Social History     Tobacco Use   • Smoking status: Never Smoker   • Smokeless tobacco: Never Used   Substance Use Topics   • Alcohol use: No            Objective     Vitals:    08/26/20 1442   BP: 126/84   Pulse: 54   Temp: 97.8 °F (36.6 °C)   SpO2: 99%     Body mass index is 36.71 kg/m².    Physical Exam   Constitutional: She appears well-developed and well-nourished.   HENT:   Head: Normocephalic and atraumatic.   Mouth/Throat: Oropharynx is clear and moist.   Eyes: Pupils are equal, round, and reactive to light. No scleral icterus.   Neck: No thyromegaly present.   Cardiovascular: Normal rate and regular rhythm. Exam reveals no gallop and no friction rub.   No murmur heard.  Pulmonary/Chest: Effort normal. No respiratory distress. She has no wheezes. She has no rales. She exhibits no tenderness.   Abdominal: Soft. Bowel sounds are normal. She exhibits no distension. There is no tenderness.   Musculoskeletal: Normal range of motion. She exhibits no edema or deformity.   Lymphadenopathy:     She has no cervical adenopathy.   Neurological: No cranial nerve deficit. She exhibits normal muscle tone.   Skin: Skin is warm and dry. No rash noted. She is not diaphoretic.   Vitals reviewed.      Lab Results   Component Value Date    GLUCOSE 88 07/12/2018    BUN 12 10/03/2019    CREATININE 1.15 (H) 10/03/2019    EGFRIFNONA 51 (L) 10/03/2019    EGFRIFAFRI 62 10/03/2019    BCR 10.4 10/03/2019    K 4.9 10/03/2019    CO2 27.6 10/03/2019    CALCIUM 9.4 10/03/2019    PROTENTOTREF 6.5 10/03/2019    ALBUMIN 4.20 10/03/2019    LABIL2 1.8 10/03/2019    AST 12 10/03/2019    ALT 9 10/03/2019       WBC   Date Value Ref Range Status   10/03/2019 8.37 3.40 - 10.80 10*3/mm3 Final     RBC   Date Value Ref Range Status   10/03/2019 4.46 3.77 - 5.28 10*6/mm3 Final     Hemoglobin   Date Value Ref Range Status   10/03/2019 13.2 12.0 - 15.9 g/dL  Final   07/12/2018 12.3 11.9 - 15.5 g/dL Final     Hematocrit   Date Value Ref Range Status   10/03/2019 40.3 34.0 - 46.6 % Final   07/12/2018 36.2 35.6 - 45.5 % Final     MCV   Date Value Ref Range Status   10/03/2019 90.4 79.0 - 97.0 fL Final   07/12/2018 83.8 80.5 - 98.2 fL Final     MCH   Date Value Ref Range Status   10/03/2019 29.6 26.6 - 33.0 pg Final   07/12/2018 28.5 26.9 - 32.0 pg Final     MCHC   Date Value Ref Range Status   10/03/2019 32.8 31.5 - 35.7 g/dL Final   07/12/2018 34.0 32.4 - 36.3 g/dL Final     RDW   Date Value Ref Range Status   10/03/2019 13.6 12.3 - 15.4 % Final   07/12/2018 15.3 (H) 11.7 - 13.0 % Final     RDW-SD   Date Value Ref Range Status   07/12/2018 47.3 37.0 - 54.0 fl Final     MPV   Date Value Ref Range Status   07/12/2018 10.6 6.0 - 12.0 fL Final     Platelets   Date Value Ref Range Status   10/03/2019 327 140 - 450 10*3/mm3 Final   07/12/2018 371 140 - 500 10*3/mm3 Final     Neutrophil Rel %   Date Value Ref Range Status   10/03/2019 CANCELED       Comment:     Test not performed    Result canceled by the ancillary.       Neutrophil %   Date Value Ref Range Status   07/12/2018 47.4 42.7 - 76.0 % Final     Lymphocyte Rel %   Date Value Ref Range Status   10/03/2019 CANCELED       Comment:     Test not performed    Result canceled by the ancillary.       Lymphocyte %   Date Value Ref Range Status   07/12/2018 45.8 (H) 19.6 - 45.3 % Final     Monocyte Rel %   Date Value Ref Range Status   10/03/2019 CANCELED       Comment:     Test not performed    Result canceled by the ancillary.       Monocyte %   Date Value Ref Range Status   07/12/2018 4.4 (L) 5.0 - 12.0 % Final     Eosinophil Rel %   Date Value Ref Range Status   10/03/2019 CANCELED       Comment:     Test not performed    Result canceled by the ancillary.     10/03/2019 1.0 0.3 - 6.2 % Final     Eosinophil %   Date Value Ref Range Status   07/12/2018 1.9 0.3 - 6.2 % Final     Basophil %   Date Value Ref Range Status    07/12/2018 0.5 0.0 - 1.5 % Final     Immature Grans %   Date Value Ref Range Status   07/12/2018 0.1 0.0 - 0.5 % Final     Neutrophils Absolute   Date Value Ref Range Status   10/03/2019 3.38 1.70 - 7.00 10*3/mm3 Final     Neutrophils, Absolute   Date Value Ref Range Status   07/12/2018 4.01 1.90 - 8.10 10*3/mm3 Final     Lymphocytes Absolute   Date Value Ref Range Status   10/03/2019 CANCELED       Comment:     Test not performed    Result canceled by the ancillary.     10/03/2019 4.65 (H) 0.70 - 3.10 10*3/mm3 Final     Lymphocytes, Absolute   Date Value Ref Range Status   07/12/2018 3.87 0.90 - 4.80 10*3/mm3 Final     Monocytes Absolute   Date Value Ref Range Status   10/03/2019 0.17 0.10 - 0.90 10*3/mm3 Final     Monocytes, Absolute   Date Value Ref Range Status   07/12/2018 0.37 0.20 - 1.20 10*3/mm3 Final     Eosinophils Absolute   Date Value Ref Range Status   10/03/2019 CANCELED       Comment:     Test not performed    Result canceled by the ancillary.       Eosinophil Abs   Date Value Ref Range Status   10/03/2019 0.08 0.00 - 0.40 10*3/mm3 Final     Eosinophils, Absolute   Date Value Ref Range Status   07/12/2018 0.16 0.00 - 0.70 10*3/mm3 Final     Basophils Absolute   Date Value Ref Range Status   10/03/2019 CANCELED       Comment:     Test not performed    Result canceled by the ancillary.     10/03/2019 0.08 0.00 - 0.20 10*3/mm3 Final     Basophils, Absolute   Date Value Ref Range Status   07/12/2018 0.04 0.00 - 0.20 10*3/mm3 Final     Immature Grans, Absolute   Date Value Ref Range Status   07/12/2018 0.01 0.00 - 0.03 10*3/mm3 Final       Lab Results   Component Value Date    HGBA1C 6.40 (H) 12/19/2017       No results found for: [id number]    TSH   Date Value Ref Range Status   12/19/2017 0.915 0.270 - 4.200 mIU/mL Final       Lab Results   Component Value Date    CHOL 150 12/19/2017     Lab Results   Component Value Date    TRIG 94 10/03/2019     Lab Results   Component Value Date    HDL 47 10/03/2019      Lab Results   Component Value Date    LDL 70 10/03/2019     Lab Results   Component Value Date    VLDL 18.8 10/03/2019     Lab Results   Component Value Date    LDLHDL 2.63 12/19/2017         Procedures    Assessment/Plan   Problems Addressed this Visit        Cardiovascular and Mediastinum    Benign essential hypertension       Hematopoietic and Hemostatic    Anemia       Other    Hyperglycemia    Encounter for annual health examination - Primary        Stop spironolactone due to SOA.  Continue bystolic.     Preventive counseling.    Tdap Today.  Continue regular exercise.    Orders Placed This Encounter   Procedures   • Tdap Vaccine Greater Than or Equal To 6yo IM       Current Outpatient Medications   Medication Sig Dispense Refill   • Multiple Vitamins-Minerals (MULTIVITAMIN WITH MINERALS) tablet tablet Take 1 tablet by mouth Daily.     • nebivolol (BYSTOLIC) 5 MG tablet Take 1 tablet by mouth Daily. 90 tablet 3     No current facility-administered medications for this visit.        No follow-ups on file.    There are no Patient Instructions on file for this visit.

## 2020-08-27 ENCOUNTER — OFFICE (AMBULATORY)
Dept: URBAN - METROPOLITAN AREA LAB 2 | Facility: LAB | Age: 45
End: 2020-08-27
Payer: COMMERCIAL

## 2020-08-27 DIAGNOSIS — Z11.59 ENCOUNTER FOR SCREENING FOR OTHER VIRAL DISEASES: ICD-10-CM

## 2020-08-27 LAB
ALBUMIN SERPL-MCNC: 4.1 G/DL (ref 3.8–4.8)
ALBUMIN/GLOB SERPL: 1.9 {RATIO} (ref 1.2–2.2)
ALP SERPL-CCNC: 61 IU/L (ref 39–117)
ALT SERPL-CCNC: 10 IU/L (ref 0–32)
AST SERPL-CCNC: 16 IU/L (ref 0–40)
BASOPHILS # BLD AUTO: 0.1 X10E3/UL (ref 0–0.2)
BASOPHILS NFR BLD AUTO: 1 %
BILIRUB SERPL-MCNC: 0.5 MG/DL (ref 0–1.2)
BUN SERPL-MCNC: 14 MG/DL (ref 6–24)
BUN/CREAT SERPL: 11 (ref 9–23)
CALCIUM SERPL-MCNC: 9.2 MG/DL (ref 8.7–10.2)
CHLORIDE SERPL-SCNC: 103 MMOL/L (ref 96–106)
CHOLEST SERPL-MCNC: 140 MG/DL (ref 100–199)
CHOLEST/HDLC SERPL: 2.9 RATIO (ref 0–4.4)
CO2 SERPL-SCNC: 26 MMOL/L (ref 20–29)
CREAT SERPL-MCNC: 1.24 MG/DL (ref 0.57–1)
EOSINOPHIL # BLD AUTO: 0.1 X10E3/UL (ref 0–0.4)
EOSINOPHIL NFR BLD AUTO: 1 %
ERYTHROCYTE [DISTWIDTH] IN BLOOD BY AUTOMATED COUNT: 13.1 % (ref 11.7–15.4)
FERRITIN SERPL-MCNC: 96 NG/ML (ref 15–150)
GLOBULIN SER CALC-MCNC: 2.2 G/DL (ref 1.5–4.5)
GLUCOSE SERPL-MCNC: 75 MG/DL (ref 65–99)
HBA1C MFR BLD: 5.3 % (ref 4.8–5.6)
HCT VFR BLD AUTO: 38.8 % (ref 34–46.6)
HDLC SERPL-MCNC: 49 MG/DL
HGB BLD-MCNC: 13.2 G/DL (ref 11.1–15.9)
IMM GRANULOCYTES # BLD AUTO: 0 X10E3/UL (ref 0–0.1)
IMM GRANULOCYTES NFR BLD AUTO: 0 %
IRON SERPL-MCNC: 57 UG/DL (ref 27–159)
LDLC SERPL CALC-MCNC: 79 MG/DL (ref 0–99)
LYMPHOCYTES # BLD AUTO: 4.2 X10E3/UL (ref 0.7–3.1)
LYMPHOCYTES NFR BLD AUTO: 52 %
MCH RBC QN AUTO: 30 PG (ref 26.6–33)
MCHC RBC AUTO-ENTMCNC: 34 G/DL (ref 31.5–35.7)
MCV RBC AUTO: 88 FL (ref 79–97)
MONOCYTES # BLD AUTO: 0.4 X10E3/UL (ref 0.1–0.9)
MONOCYTES NFR BLD AUTO: 5 %
NEUTROPHILS # BLD AUTO: 3.3 X10E3/UL (ref 1.4–7)
NEUTROPHILS NFR BLD AUTO: 41 %
PLATELET # BLD AUTO: 301 X10E3/UL (ref 150–450)
POTASSIUM SERPL-SCNC: 4.4 MMOL/L (ref 3.5–5.2)
PROT SERPL-MCNC: 6.3 G/DL (ref 6–8.5)
RBC # BLD AUTO: 4.4 X10E6/UL (ref 3.77–5.28)
SODIUM SERPL-SCNC: 142 MMOL/L (ref 134–144)
TRIGL SERPL-MCNC: 61 MG/DL (ref 0–149)
VLDLC SERPL CALC-MCNC: 12 MG/DL (ref 5–40)
WBC # BLD AUTO: 8.1 X10E3/UL (ref 3.4–10.8)

## 2020-08-27 PROCEDURE — 87633 RESP VIRUS 12-25 TARGETS: CPT

## 2020-08-28 PROBLEM — Z12.11 SCREENING FOR COLONIC NEOPLASIA: Status: ACTIVE | Noted: 2020-08-31

## 2020-08-31 ENCOUNTER — AMBULATORY SURGICAL CENTER (AMBULATORY)
Dept: URBAN - METROPOLITAN AREA SURGERY 17 | Facility: SURGERY | Age: 45
End: 2020-08-31
Payer: COMMERCIAL

## 2020-08-31 DIAGNOSIS — K64.9 UNSPECIFIED HEMORRHOIDS: ICD-10-CM

## 2020-08-31 DIAGNOSIS — Z12.11 ENCOUNTER FOR SCREENING FOR MALIGNANT NEOPLASM OF COLON: ICD-10-CM

## 2020-08-31 PROCEDURE — 45378 DIAGNOSTIC COLONOSCOPY: CPT | Mod: 33

## 2020-09-18 ENCOUNTER — CLINICAL SUPPORT (OUTPATIENT)
Dept: FAMILY MEDICINE CLINIC | Facility: CLINIC | Age: 45
End: 2020-09-18

## 2020-09-18 DIAGNOSIS — Z23 IMMUNIZATION DUE: Primary | ICD-10-CM

## 2020-09-18 PROCEDURE — 90471 IMMUNIZATION ADMIN: CPT | Performed by: FAMILY MEDICINE

## 2020-09-18 PROCEDURE — 90686 IIV4 VACC NO PRSV 0.5 ML IM: CPT | Performed by: FAMILY MEDICINE

## 2020-10-12 ENCOUNTER — OFFICE VISIT (OUTPATIENT)
Dept: BARIATRICS/WEIGHT MGMT | Facility: CLINIC | Age: 45
End: 2020-10-12

## 2020-10-12 VITALS
TEMPERATURE: 97.8 F | BODY MASS INDEX: 36.99 KG/M2 | WEIGHT: 222 LBS | HEIGHT: 65 IN | SYSTOLIC BLOOD PRESSURE: 142 MMHG | DIASTOLIC BLOOD PRESSURE: 83 MMHG | RESPIRATION RATE: 18 BRPM | HEART RATE: 62 BPM

## 2020-10-12 DIAGNOSIS — R53.82 CHRONIC FATIGUE: ICD-10-CM

## 2020-10-12 DIAGNOSIS — E66.9 OBESITY, CLASS II, BMI 35-39.9: Primary | ICD-10-CM

## 2020-10-12 DIAGNOSIS — Z98.84 S/P LAPAROSCOPIC SLEEVE GASTRECTOMY: ICD-10-CM

## 2020-10-12 DIAGNOSIS — G47.33 OSA (OBSTRUCTIVE SLEEP APNEA): ICD-10-CM

## 2020-10-12 PROBLEM — E66.811 OBESITY, CLASS I, BMI 30-34.9: Status: RESOLVED | Noted: 2019-10-04 | Resolved: 2020-10-12

## 2020-10-12 PROCEDURE — 99213 OFFICE O/P EST LOW 20 MIN: CPT | Performed by: NURSE PRACTITIONER

## 2020-10-12 RX ORDER — SPIRONOLACTONE 50 MG/1
50 TABLET, FILM COATED ORAL DAILY
COMMUNITY
Start: 2020-04-30 | End: 2020-10-12

## 2020-10-12 NOTE — PROGRESS NOTES
[facility] BARIATRIC [facility] BARIATRIC SURGERY  [address] [facility] [address]  [facility] [address]  [phone]  [address] [facility] [address]  [facility] [id number] [phone]  Dept: [phone]  10/12/2020      Shruthi HEWITT Overall.  [id number]  [id number]  1975  female      Chief Complaint   Patient presents with   • Follow-up     2.5 year follow up sleeve       BH Post-Op Bariatric Surgery:   Shruthi HEWITT Overall is status post Laparoscopic Sleeve procedure, performed on 4/18/18     HPI:   Today's weight is 101 kg (222 lb) pounds, today's BMI is Body mass index is 36.94 kg/m²., she has a  gain of 13 pounds since the last visit and her weight loss since surgery is 82 pounds. The patient reports a decreased portion size and loss of appetite.       Diet and Exercise: Diet history reviewed and discussed with the patient. Weight loss/gains to date discussed with the patient. The patient states they are eating 60 grams of protein per day. She reports eating 3 meals per day, a typical portion size of 1/2 cup, eating 3 snacks per day, drinking 3 or more 8-oz. glasses of water per day, no carbonated beverage consumption and exercising regularly. She is doing crossfit a couple of days per week and spinning at home a couple of days per week    Usually has a protein shake for breakfast will add fruit and spinach. She usually gets a hardboiled egg, maybe a baby bell cheese, serving size of grapes. She gets off at 1130 and can go home and eat lunch. She usually has a meat and a veggie, 1 serving of pomegranate. She will get a meat and a veggie for dinner. She is getting around 6oz of meat per meal.     Supplements: BA ULTA solo with iron.     Review of Systems   Constitutional: Negative for appetite change, fatigue and unexpected weight change.   HENT: Negative.    Eyes: Negative.    Respiratory: Negative.    Cardiovascular: Negative.  Negative for leg swelling.   Gastrointestinal: Negative for abdominal  distention, abdominal pain, constipation, diarrhea, nausea and vomiting.   Genitourinary: Negative for difficulty urinating, frequency and urgency.   Musculoskeletal: Negative for back pain.   Skin: Positive for rash (itching, moisture, under abdominal pannus).   Psychiatric/Behavioral: Negative.    All other systems reviewed and are negative.      Patient Active Problem List   Diagnosis   • Chronic fatigue   • Edema   • TERRI (obstructive sleep apnea)   • Dietary counseling   • Obesity, Class II, BMI 35-39.9   • Allergic rhinitis   • Anemia   • Benign essential hypertension   • BPPV (benign paroxysmal positional vertigo)   • Hyperglycemia   • Wheezing   • Palpitations   • S/P laparoscopic sleeve gastrectomy   • Upper respiratory infection   • Encounter for annual health examination       Past Medical History:   Diagnosis Date   • Abnormal uterine bleeding    • Acute foot pain, left    • Allergic rhinitis    • Anemia    • Ankle joint pain    • Bacterial vaginosis    • Benign essential hypertension    • BMI 50.0-59.9, adult (CMS/[facility])    • BPPV (benign paroxysmal positional vertigo)    • Daytime hypersomnolence    • Edema    • Hemorrhoids    • History of blood transfusion    • History of chickenpox    • Hx of bariatric surgery    • Hyperglycemia    • Hypocalcemia    • Irregular menstrual cycle    • Irritability and anger    • Macromastia    • Menorrhagia    • Obesity    • Obstructive sleep apnea syndrome    • Optic nerve swelling    • Palpitations    • Sleep difficulties    • Snoring    • Swelling    • Swelling of both ankles    • Trapezius muscle spasm    • Weight gain    • Wheezing        The following portions of the patient's history were reviewed and updated as appropriate: allergies, current medications, past family history, past medical history, past social history, past surgical history and problem list.    Vitals:    10/12/20 1350   BP: 142/83   Pulse: 62   Resp: 18   Temp: 97.8 °F (36.6 °C)       Physical  Exam  Vitals signs and nursing note reviewed.   Constitutional:       Appearance: She is well-developed.   Neck:      Thyroid: No thyromegaly.   Cardiovascular:      Rate and Rhythm: Normal rate and regular rhythm.      Heart sounds: Normal heart sounds.   Pulmonary:      Effort: Pulmonary effort is normal. No respiratory distress.      Breath sounds: Normal breath sounds. No wheezing.   Abdominal:      General: Bowel sounds are normal. There is no distension.      Palpations: Abdomen is soft.      Tenderness: There is no abdominal tenderness. There is no guarding.      Hernia: No hernia is present.   Musculoskeletal:         General: No tenderness.   Skin:     General: Skin is warm and dry.      Findings: No erythema or rash.      Comments: Redness, moisture, under abdominal pannus   Neurological:      Mental Status: She is alert.   Psychiatric:         Behavior: Behavior normal.         Assessment:   Post-op, the patient is doing well.     Encounter Diagnoses   Name Primary?   • Obesity, Class II, BMI 35-39.9 Yes   • S/P laparoscopic sleeve gastrectomy    • TERRI (obstructive sleep apnea)    • Chronic fatigue        Plan:   We did discuss potential skin removal surgery and panniculectomy in detail as well as local surgeons she was given a list of referrals and to print out outlines skin removal procedures, insurance coverage etc.  She is encouraged to keep up the good work with her exercise.  We will get her in for follow-up with the dietitian regarding her caloric intake and eating to support her fitness endeavors  Encouraged patient to be sure to get plenty of lean protein per day through small frequent meals all with a protein source.   Activity restrictions: none.   Recommended patient be sure to get at least 70 grams of protein per day by eating small, frequent meals all with high lean protein choices. Be sure to limit/cut back on daily carbohydrate intake. Discussed with the patient the recommended amount of  water per day to intake- half of body weight in ounces. Reviewed vitamin requirements. Be sure to do routine exercise, 150 minutes per week minimum, including both cardio and strength training.     Instructions / Recommendations: dietary counseling recommended, recommended a daily protein intake of  grams, vitamin supplement(s) recommended, recommended exercising at least 150 minutes per week, behavior modifications recommended and instructed to call the office for concerns, questions, or problems.     The patient was instructed to follow up in 6 months .     Total time spent face to face was 20 minutes and 15 minutes was spent counseling.

## 2021-02-03 ENCOUNTER — TELEMEDICINE (OUTPATIENT)
Dept: FAMILY MEDICINE CLINIC | Facility: CLINIC | Age: 46
End: 2021-02-03

## 2021-02-03 DIAGNOSIS — R09.81 NASAL CONGESTION: ICD-10-CM

## 2021-02-03 DIAGNOSIS — Z20.822 CLOSE EXPOSURE TO COVID-19 VIRUS: Primary | ICD-10-CM

## 2021-02-03 PROCEDURE — 99213 OFFICE O/P EST LOW 20 MIN: CPT | Performed by: FAMILY MEDICINE

## 2021-02-03 NOTE — PROGRESS NOTES
Plan of sinus congestion.    Subjective   Shruthi Dockery is a 45 y.o. female.     History of Present Illness   Telemedicine visit due to Covid.  No video access.  Consent obtained.  8-minute visit.  Patient complains of sinus congestion with nasal stuffiness.  Patient states this has been going on 3 days.  Patient has been exposed to her  who has known Covid.  No fever /chills, no shortness of breath.  The following portions of the patient's history were reviewed and updated as appropriate: allergies, current medications, past family history, past medical history, past social history, past surgical history and problem list.    Review of Systems   Constitutional: Negative for appetite change and fatigue.   HENT: Positive for sinus pressure. Negative for nosebleeds and sore throat.    Eyes: Negative for blurred vision and visual disturbance.   Respiratory: Negative for shortness of breath and wheezing.    Cardiovascular: Negative for chest pain and leg swelling.   Gastrointestinal: Negative for abdominal distention and abdominal pain.   Endocrine: Negative for cold intolerance and polyuria.   Genitourinary: Negative for dysuria and hematuria.   Musculoskeletal: Negative for arthralgias and myalgias.   Skin: Negative for color change and rash.   Neurological: Negative for weakness and confusion.   Psychiatric/Behavioral: Negative for agitation and depressed mood.       Patient Active Problem List   Diagnosis   • Chronic fatigue   • Edema   • TERRI (obstructive sleep apnea)   • Dietary counseling   • Obesity, Class II, BMI 35-39.9   • Allergic rhinitis   • Anemia   • Benign essential hypertension   • BPPV (benign paroxysmal positional vertigo)   • Hyperglycemia   • Wheezing   • Palpitations   • S/P laparoscopic sleeve gastrectomy   • Upper respiratory infection   • Encounter for annual health examination   • Nasal congestion   • Close exposure to COVID-19 virus       Allergies   Allergen Reactions   •  Lisinopril-Hydrochlorothiazide Cough   • Penicillins Hives   • Skelaxin [Metaxalone] Hives   • Sulfa Antibiotics Hives         Current Outpatient Medications:   •  nebivolol (BYSTOLIC) 5 MG tablet, Take 1 tablet by mouth Daily., Disp: 90 tablet, Rfl: 3  •  NON FORMULARY, Take 1 capsule by mouth Daily. Bariatric advantage ultra solo, Disp: , Rfl:     Past Medical History:   Diagnosis Date   • Abnormal uterine bleeding    • Acute foot pain, left    • Allergic rhinitis    • Anemia    • Ankle joint pain    • Bacterial vaginosis    • Benign essential hypertension    • BMI 50.0-59.9, adult (CMS/[facility])    • BPPV (benign paroxysmal positional vertigo)    • Daytime hypersomnolence    • Edema    • Hemorrhoids    • History of blood transfusion    • History of chickenpox    • Hx of bariatric surgery    • Hyperglycemia    • Hypocalcemia    • Irregular menstrual cycle    • Irritability and anger    • Macromastia    • Menorrhagia    • Obesity    • Obstructive sleep apnea syndrome    • Optic nerve swelling    • Palpitations    • Sleep difficulties    • Snoring    • Swelling    • Swelling of both ankles    • Trapezius muscle spasm    • Weight gain    • Wheezing        Past Surgical History:   Procedure Laterality Date   • GASTRIC SLEEVE LAPAROSCOPIC N/A 4/18/2018    Procedure: GASTRIC SLEEVE LAPAROSCOPIC;  Surgeon: Christ Sunshine Jr., MD;  Location: [facility] OR [facility];  Service: Bariatric   • TUBAL ABDOMINAL LIGATION     • VAGINAL HYSTERECTOMY         Family History   Problem Relation Age of Onset   • Obesity Mother    • Hypertension Mother    • Stroke Mother    • Coronary artery disease Mother    • Sleep apnea Mother    • Hypertension Sister    • Hypertension Maternal Grandfather    • Malig Hyperthermia Neg Hx        Social History     Tobacco Use   • Smoking status: Never Smoker   • Smokeless tobacco: Never Used   Substance Use Topics   • Alcohol use: No            Objective     There were no vitals filed for this visit.  There is no  height or weight on file to calculate BMI.    Physical Exam  Neurological:      Mental Status: She is oriented to person, place, and time.   Psychiatric:         Thought Content: Thought content normal.         Judgment: Judgment normal.         Lab Results   Component Value Date    GLUCOSE 88 07/12/2018    BUN 14 08/26/2020    CREATININE 1.24 (H) 08/26/2020    EGFRIFNONA 52 (L) 08/26/2020    EGFRIFAFRI 60 08/26/2020    BCR 11 08/26/2020    K 4.4 08/26/2020    CO2 26 08/26/2020    CALCIUM 9.2 08/26/2020    PROTENTOTREF 6.3 08/26/2020    ALBUMIN 4.1 08/26/2020    LABIL2 1.9 08/26/2020    AST 16 08/26/2020    ALT 10 08/26/2020       WBC   Date Value Ref Range Status   08/26/2020 8.1 3.4 - 10.8 x10E3/uL Final     RBC   Date Value Ref Range Status   08/26/2020 4.40 3.77 - 5.28 x10E6/uL Final     Hemoglobin   Date Value Ref Range Status   08/26/2020 13.2 11.1 - 15.9 g/dL Final   07/12/2018 12.3 11.9 - 15.5 g/dL Final     Hematocrit   Date Value Ref Range Status   08/26/2020 38.8 34.0 - 46.6 % Final   07/12/2018 36.2 35.6 - 45.5 % Final     MCV   Date Value Ref Range Status   08/26/2020 88 79 - 97 fL Final   07/12/2018 83.8 80.5 - 98.2 fL Final     MCH   Date Value Ref Range Status   08/26/2020 30.0 26.6 - 33.0 pg Final   07/12/2018 28.5 26.9 - 32.0 pg Final     MCHC   Date Value Ref Range Status   08/26/2020 34.0 31.5 - 35.7 g/dL Final   07/12/2018 34.0 32.4 - 36.3 g/dL Final     RDW   Date Value Ref Range Status   08/26/2020 13.1 11.7 - 15.4 % Final   07/12/2018 15.3 (H) 11.7 - 13.0 % Final     RDW-SD   Date Value Ref Range Status   07/12/2018 47.3 37.0 - 54.0 fl Final     MPV   Date Value Ref Range Status   07/12/2018 10.6 6.0 - 12.0 fL Final     Platelets   Date Value Ref Range Status   08/26/2020 301 150 - 450 x10E3/uL Final   07/12/2018 371 140 - 500 10*3/mm3 Final     Neutrophil Rel %   Date Value Ref Range Status   08/26/2020 41 Not Estab. % Final     Neutrophil %   Date Value Ref Range Status   07/12/2018 47.4  42.7 - 76.0 % Final     Lymphocyte Rel %   Date Value Ref Range Status   08/26/2020 52 Not Estab. % Final     Lymphocyte %   Date Value Ref Range Status   07/12/2018 45.8 (H) 19.6 - 45.3 % Final     Monocyte Rel %   Date Value Ref Range Status   08/26/2020 5 Not Estab. % Final     Monocyte %   Date Value Ref Range Status   07/12/2018 4.4 (L) 5.0 - 12.0 % Final     Eosinophil Rel %   Date Value Ref Range Status   08/26/2020 1 Not Estab. % Final     Eosinophil %   Date Value Ref Range Status   07/12/2018 1.9 0.3 - 6.2 % Final     Basophil Rel %   Date Value Ref Range Status   08/26/2020 1 Not Estab. % Final     Basophil %   Date Value Ref Range Status   07/12/2018 0.5 0.0 - 1.5 % Final     Immature Grans %   Date Value Ref Range Status   07/12/2018 0.1 0.0 - 0.5 % Final     Neutrophils Absolute   Date Value Ref Range Status   08/26/2020 3.3 1.4 - 7.0 x10E3/uL Final     Neutrophils, Absolute   Date Value Ref Range Status   07/12/2018 4.01 1.90 - 8.10 10*3/mm3 Final     Lymphocytes Absolute   Date Value Ref Range Status   08/26/2020 4.2 (H) 0.7 - 3.1 x10E3/uL Final     Lymphocytes, Absolute   Date Value Ref Range Status   07/12/2018 3.87 0.90 - 4.80 10*3/mm3 Final     Monocytes Absolute   Date Value Ref Range Status   08/26/2020 0.4 0.1 - 0.9 x10E3/uL Final     Monocytes, Absolute   Date Value Ref Range Status   07/12/2018 0.37 0.20 - 1.20 10*3/mm3 Final     Eosinophils Absolute   Date Value Ref Range Status   08/26/2020 0.1 0.0 - 0.4 x10E3/uL Final     Eosinophils, Absolute   Date Value Ref Range Status   07/12/2018 0.16 0.00 - 0.70 10*3/mm3 Final     Basophils Absolute   Date Value Ref Range Status   08/26/2020 0.1 0.0 - 0.2 x10E3/uL Final     Basophils, Absolute   Date Value Ref Range Status   07/12/2018 0.04 0.00 - 0.20 10*3/mm3 Final     Immature Grans, Absolute   Date Value Ref Range Status   07/12/2018 0.01 0.00 - 0.03 10*3/mm3 Final       Lab Results   Component Value Date    HGBA1C 5.3 08/26/2020       No  results found for: [id number]    TSH   Date Value Ref Range Status   12/19/2017 0.915 0.270 - 4.200 mIU/mL Final       Lab Results   Component Value Date    CHOL 150 12/19/2017     Lab Results   Component Value Date    TRIG 61 08/26/2020     Lab Results   Component Value Date    HDL 49 08/26/2020     Lab Results   Component Value Date    LDL 79 08/26/2020     Lab Results   Component Value Date    VLDL 12 08/26/2020     Lab Results   Component Value Date    LDLHDL 2.63 12/19/2017         Procedures    Assessment/Plan   Problems Addressed this Visit     Close exposure to COVID-19 virus - Primary    Relevant Orders    COVID-19,[facility] ROUTINE, NP/OP SWAB IN TRANSPORT MEDIA OR ESWAB 72 HR TAT - Swab, Nasopharynx    Nasal congestion    Relevant Orders    COVID-19,[facility] ROUTINE, NP/OP SWAB IN TRANSPORT MEDIA OR ESWAB 72 HR TAT - Swab, Nasopharynx      Diagnoses       Codes Comments    Close exposure to COVID-19 virus    -  Primary ICD-10-CM: Z20.822  ICD-9-CM: V01.79     Nasal congestion     ICD-10-CM: R09.81  ICD-9-CM: 478.19         Quarantine for next 1 week.  Test for Covid.  Orders Placed This Encounter   Procedures   • COVID-19,[facility] ROUTINE, NP/OP SWAB IN TRANSPORT MEDIA OR ESWAB 72 HR TAT - Swab, Nasopharynx     Order Specific Question:   Previously tested for COVID-19?     Answer:   No     Order Specific Question:   Employed in healthcare setting?     Answer:   No     Order Specific Question:   Symptomatic for COVID-19 as defined by CDC?     Answer:   Yes     Order Specific Question:   Hospitalized for COVID-19?     Answer:   No     Order Specific Question:   Admitted to ICU for COVID-19?     Answer:   No     Order Specific Question:   Resident in a congregate (group) care setting?     Answer:   No     Order Specific Question:   Pregnant?     Answer:   No       Current Outpatient Medications   Medication Sig Dispense Refill   • nebivolol (BYSTOLIC) 5 MG tablet Take 1 tablet by mouth Daily. 90 tablet 3   • NON  FORMULARY Take 1 capsule by mouth Daily. Bariatric advantage ultra solo       No current facility-administered medications for this visit.        Shruthi Dockery had no medications administered during this visit.    No follow-ups on file.    There are no Patient Instructions on file for this visit.

## 2021-02-04 ENCOUNTER — TELEPHONE (OUTPATIENT)
Dept: FAMILY MEDICINE CLINIC | Facility: CLINIC | Age: 46
End: 2021-02-04

## 2021-02-04 NOTE — TELEPHONE ENCOUNTER
Caller: Shruthi Dockery    Relationship to patient: Self    Best call back number: [phone]    Concerns or Questions if Applicable: PATIENT WOULD LIKE TO KNOW IF HER COVID TEST HAS COME BACK YET.    CALL BACK [phone]

## 2021-02-05 LAB — SARS-COV-2 RNA RESP QL NAA+PROBE: NOT DETECTED

## 2021-02-08 ENCOUNTER — TELEPHONE (OUTPATIENT)
Dept: FAMILY MEDICINE CLINIC | Facility: CLINIC | Age: 46
End: 2021-02-08

## 2021-02-08 NOTE — TELEPHONE ENCOUNTER
Caller: Overall, Shira R    Relationship: Self    Best call back number: [phone]    Caller requesting test results: SELF (SHIRA OVERALL)    What test was performed: COVID-19,[facility] ROUTINE, NP/OP SWAB IN TRANSPORT MEDIA OR ESWAB 72 HR TAT - SWAB, NASOPHARYNX    When was the test performed: LAST WEDNESDAY (2/3/21)    Where was the test performed: [facility]    Additional notes: N/A

## 2021-02-08 NOTE — TELEPHONE ENCOUNTER
I sent a my chart note to her on the 5th saying it was negative. She must have not seen that.   Tell her no sign of infection.

## 2021-02-08 NOTE — TELEPHONE ENCOUNTER
LVM for patient to return call to office. Okay for HUB to read:      I sent a my chart note to her on the 5th saying it was negative. She must have not seen that.   Tell her no sign of infection.

## 2021-02-22 DIAGNOSIS — I10 BENIGN ESSENTIAL HYPERTENSION: Primary | ICD-10-CM

## 2021-02-22 RX ORDER — METOPROLOL SUCCINATE 50 MG/1
50 TABLET, EXTENDED RELEASE ORAL DAILY
Qty: 90 TABLET | Refills: 3 | Status: SHIPPED | OUTPATIENT
Start: 2021-02-22 | End: 2022-01-03

## 2021-03-16 DIAGNOSIS — I10 ESSENTIAL HYPERTENSION: ICD-10-CM

## 2021-03-16 RX ORDER — NEBIVOLOL HYDROCHLORIDE 5 MG/1
TABLET ORAL
Qty: 90 TABLET | Refills: 3 | OUTPATIENT
Start: 2021-03-16

## 2021-03-16 NOTE — TELEPHONE ENCOUNTER
LOV 2/3/2021        When is patient due for follow up?        Also is patient supposed to be taking Bystolic? It was discontinued on 12/2019 to reorder but nothing else on her med list after that? Please advise.

## 2021-03-17 ENCOUNTER — TELEPHONE (OUTPATIENT)
Dept: FAMILY MEDICINE CLINIC | Facility: CLINIC | Age: 46
End: 2021-03-17

## 2021-03-17 NOTE — TELEPHONE ENCOUNTER
Caller: Overall, Shruthi HEWITT    Relationship: Self    Best call back number: [phone]     What medications are you currently taking:   Current Outpatient Medications on File Prior to Visit   Medication Sig Dispense Refill   • metoprolol succinate XL (Toprol XL) 50 MG 24 hr tablet Take 1 tablet by mouth Daily. Takes place of bystolic due to insurance substitution. 90 tablet 3   • NON FORMULARY Take 1 capsule by mouth Daily. Bariatric advantage ultra solo       No current facility-administered medications on file prior to visit.        What are your concerns: PATIENT IS CONCERNED ABOUT THE MG. PATIENT SAYS SHE WAS ON SOMETHING ELSE PREVIOUSLY. (metoprolol succinate XL (Toprol XL) 50 MG 24 hr tablet)    How long have you been taking these medications: PATIENT JUST PICKED UP NERY

## 2021-03-17 NOTE — TELEPHONE ENCOUNTER
Spoke with patient regarding her metoprolol prescription and advised that if she notices her blood pressure becoming too low while at home or has any issues to contact the office. Patient verbalized understanding.

## 2021-03-17 NOTE — TELEPHONE ENCOUNTER
PT STATES THAT SHE NEEDS HER BLOOD PRESSURE MEDICATION REFILLED, BISTOLIC AND THAT SHE IS OUT.    PHARMACY [facility]/pharm[facility] #[id number] - [place], [place] - [address] - [phone]  - [phone]   [phone]    CALL BACK [phone]

## 2021-03-17 NOTE — TELEPHONE ENCOUNTER
Patient aware Dr Truong switched her to metoprolol and does not want her to take Bystolic any longer. Patient verbalized understanding.

## 2021-04-06 ENCOUNTER — BULK ORDERING (OUTPATIENT)
Dept: CASE MANAGEMENT | Facility: OTHER | Age: 46
End: 2021-04-06

## 2021-04-06 DIAGNOSIS — Z23 IMMUNIZATION DUE: ICD-10-CM

## 2021-04-21 ENCOUNTER — OFFICE VISIT (OUTPATIENT)
Dept: FAMILY MEDICINE CLINIC | Facility: CLINIC | Age: 46
End: 2021-04-21

## 2021-04-21 VITALS
WEIGHT: 232 LBS | HEART RATE: 64 BPM | SYSTOLIC BLOOD PRESSURE: 145 MMHG | HEIGHT: 65 IN | DIASTOLIC BLOOD PRESSURE: 76 MMHG | OXYGEN SATURATION: 99 % | BODY MASS INDEX: 38.65 KG/M2 | TEMPERATURE: 98.4 F

## 2021-04-21 DIAGNOSIS — E66.9 OBESITY, CLASS II, BMI 35-39.9: ICD-10-CM

## 2021-04-21 DIAGNOSIS — I10 BENIGN ESSENTIAL HYPERTENSION: Primary | ICD-10-CM

## 2021-04-21 PROCEDURE — 99214 OFFICE O/P EST MOD 30 MIN: CPT | Performed by: FAMILY MEDICINE

## 2021-04-21 RX ORDER — MULTIPLE VITAMINS W/ MINERALS TAB 9MG-400MCG
2 TAB ORAL DAILY
COMMUNITY

## 2021-04-21 RX ORDER — AMLODIPINE BESYLATE 5 MG/1
5 TABLET ORAL DAILY
Qty: 90 TABLET | Refills: 3 | Status: SHIPPED | OUTPATIENT
Start: 2021-04-21 | End: 2021-07-27

## 2021-04-21 NOTE — PROGRESS NOTES
Chief Complaint   Patient presents with   • Hypertension       Subjective   Shruthi Dockery is a 45 y.o. female.     History of Present Illness   F/U HTN.  No orthostasis.  Doing wel [employer] meds.  No regular exercise.     F/U Obesity.  Up from 210lb 2019.    The following portions of the patient's history were reviewed and updated as appropriate: allergies, current medications, past family history, past medical history, past social history, past surgical history and problem list.    Review of Systems   Constitutional: Negative for appetite change and fatigue.   HENT: Negative for nosebleeds and sore throat.    Eyes: Negative for blurred vision and visual disturbance.   Respiratory: Negative for shortness of breath and wheezing.    Cardiovascular: Negative for chest pain and leg swelling.   Gastrointestinal: Negative for abdominal distention and abdominal pain.   Endocrine: Negative for cold intolerance and polyuria.   Genitourinary: Negative for dysuria and hematuria.   Musculoskeletal: Negative for arthralgias and myalgias.   Skin: Negative for color change and rash.   Neurological: Negative for weakness and confusion.   Psychiatric/Behavioral: Negative for agitation and depressed mood.       Patient Active Problem List   Diagnosis   • Chronic fatigue   • Edema   • TERRI (obstructive sleep apnea)   • Dietary counseling   • Obesity, Class II, BMI 35-39.9   • Allergic rhinitis   • Anemia   • Benign essential hypertension   • BPPV (benign paroxysmal positional vertigo)   • Hyperglycemia   • Wheezing   • Palpitations   • S/P laparoscopic sleeve gastrectomy   • Upper respiratory infection   • Encounter for annual health examination   • Nasal congestion   • Close exposure to COVID-19 virus       Allergies   Allergen Reactions   • Lisinopril-Hydrochlorothiazide Cough   • Penicillins Hives   • Skelaxin [Metaxalone] Hives   • Sulfa Antibiotics Hives         Current Outpatient Medications:   •  metoprolol succinate XL (Toprol  XL) 50 MG 24 hr tablet, Take 1 tablet by mouth Daily. [facility]., Disp: 90 tablet, Rfl: 3  •  multivitamin with minerals (MULTIVITAMIN ADULT PO), Take 2 tablets by mouth Daily., Disp: , Rfl:   •  amLODIPine (NORVASC) 5 MG tablet, Take 1 tablet by mouth Daily., Disp: 90 tablet, Rfl: 3  •  NON FORMULARY, Take 1 capsule by mouth Daily. Bariatric advantage ultra solo, Disp: , Rfl:     Past Medical History:   Diagnosis Date   • Abnormal uterine bleeding    • Acute foot pain, left    • Allergic rhinitis    • Anemia    • Ankle joint pain    • Bacterial vaginosis    • Benign essential hypertension    • BMI 50.0-59.9, adult (CMS/[facility])    • BPPV (benign paroxysmal positional vertigo)    • Daytime hypersomnolence    • Edema    • Hemorrhoids    • History of blood transfusion    • History of chickenpox    • Hx of bariatric surgery    • Hyperglycemia    • Hypocalcemia    • Irregular menstrual cycle    • Irritability and anger    • Macromastia    • Menorrhagia    • Obesity    • Obstructive sleep apnea syndrome    • Optic nerve swelling    • Palpitations    • Sleep difficulties    • Snoring    • Swelling    • Swelling of both ankles    • Trapezius muscle spasm    • Weight gain    • Wheezing        Past Surgical History:   Procedure Laterality Date   • GASTRIC SLEEVE LAPAROSCOPIC N/A 4/18/2018    Procedure: GASTRIC SLEEVE LAPAROSCOPIC;  Surgeon: Christ Sunshine Jr., MD;  Location: [facility];  Service: Bariatric   • TUBAL ABDOMINAL LIGATION     • VAGINAL HYSTERECTOMY         Family History   Problem Relation Age of Onset   • Obesity Mother    • Hypertension Mother    • Stroke Mother    • Coronary artery disease Mother    • Sleep apnea Mother    • Hypertension Sister    • Hypertension Maternal Grandfather    • Malig Hyperthermia Neg Hx        Social History     Tobacco Use   • Smoking status: Never Smoker   • Smokeless tobacco: Never Used   Substance Use Topics   • Alcohol use: No             Objective     Vitals:    04/21/21 1422   BP: 145/76   Pulse: 64   Temp: 98.4 °F (36.9 °C)   SpO2: 99%     Body mass index is 38.61 kg/m².    Physical Exam  Vitals reviewed.   Constitutional:       Appearance: She is well-developed. She is not diaphoretic.   HENT:      Head: Normocephalic and atraumatic.   Eyes:      General: No scleral icterus.     Pupils: Pupils are equal, round, and reactive to light.   Neck:      Thyroid: No thyromegaly.   Cardiovascular:      Rate and Rhythm: Normal rate and regular rhythm.      Heart sounds: No murmur heard.   No friction rub. No gallop.    Pulmonary:      Effort: Pulmonary effort is normal. No respiratory distress.      Breath sounds: No wheezing or rales.   Chest:      Chest wall: No tenderness.   Abdominal:      General: Bowel sounds are normal. There is no distension.      Palpations: Abdomen is soft.      Tenderness: There is no abdominal tenderness.   Musculoskeletal:         General: No deformity. Normal range of motion.   Lymphadenopathy:      Cervical: No cervical adenopathy.   Skin:     General: Skin is warm and dry.      Findings: No rash.   Neurological:      Cranial Nerves: No cranial nerve deficit.      Motor: No abnormal muscle tone.         Lab Results   Component Value Date    GLUCOSE 88 07/12/2018    BUN 14 08/26/2020    CREATININE 1.24 (H) 08/26/2020    EGFRIFNONA 52 (L) 08/26/2020    EGFRIFAFRI 60 08/26/2020    BCR 11 08/26/2020    K 4.4 08/26/2020    CO2 26 08/26/2020    CALCIUM 9.2 08/26/2020    PROTENTOTREF 6.3 08/26/2020    ALBUMIN 4.1 08/26/2020    LABIL2 1.9 08/26/2020    AST 16 08/26/2020    ALT 10 08/26/2020       WBC   Date Value Ref Range Status   08/26/2020 8.1 3.4 - 10.8 x10E3/uL Final     RBC   Date Value Ref Range Status   08/26/2020 4.40 3.77 - 5.28 x10E6/uL Final     Hemoglobin   Date Value Ref Range Status   08/26/2020 13.2 11.1 - 15.9 g/dL Final   07/12/2018 12.3 11.9 - 15.5 g/dL Final     Hematocrit   Date Value Ref Range Status    08/26/2020 38.8 34.0 - 46.6 % Final   07/12/2018 36.2 35.6 - 45.5 % Final     MCV   Date Value Ref Range Status   08/26/2020 88 79 - 97 fL Final   07/12/2018 83.8 80.5 - 98.2 fL Final     MCH   Date Value Ref Range Status   08/26/2020 30.0 26.6 - 33.0 pg Final   07/12/2018 28.5 26.9 - 32.0 pg Final     MCHC   Date Value Ref Range Status   08/26/2020 34.0 31.5 - 35.7 g/dL Final   07/12/2018 34.0 32.4 - 36.3 g/dL Final     RDW   Date Value Ref Range Status   08/26/2020 13.1 11.7 - 15.4 % Final   07/12/2018 15.3 (H) 11.7 - 13.0 % Final     RDW-SD   Date Value Ref Range Status   07/12/2018 47.3 37.0 - 54.0 fl Final     MPV   Date Value Ref Range Status   07/12/2018 10.6 6.0 - 12.0 fL Final     Platelets   Date Value Ref Range Status   08/26/2020 301 150 - 450 x10E3/uL Final   07/12/2018 371 140 - 500 10*3/mm3 Final     Neutrophil Rel %   Date Value Ref Range Status   08/26/2020 41 Not Estab. % Final     Neutrophil %   Date Value Ref Range Status   07/12/2018 47.4 42.7 - 76.0 % Final     Lymphocyte Rel %   Date Value Ref Range Status   08/26/2020 52 Not Estab. % Final     Lymphocyte %   Date Value Ref Range Status   07/12/2018 45.8 (H) 19.6 - 45.3 % Final     Monocyte Rel %   Date Value Ref Range Status   08/26/2020 5 Not Estab. % Final     Monocyte %   Date Value Ref Range Status   07/12/2018 4.4 (L) 5.0 - 12.0 % Final     Eosinophil Rel %   Date Value Ref Range Status   08/26/2020 1 Not Estab. % Final     Eosinophil %   Date Value Ref Range Status   07/12/2018 1.9 0.3 - 6.2 % Final     Basophil Rel %   Date Value Ref Range Status   08/26/2020 1 Not Estab. % Final     Basophil %   Date Value Ref Range Status   07/12/2018 0.5 0.0 - 1.5 % Final     Immature Grans %   Date Value Ref Range Status   07/12/2018 0.1 0.0 - 0.5 % Final     Neutrophils Absolute   Date Value Ref Range Status   08/26/2020 3.3 1.4 - 7.0 x10E3/uL Final     Neutrophils, Absolute   Date Value Ref Range Status   07/12/2018 4.01 1.90 - 8.10 10*3/mm3  Final     Lymphocytes Absolute   Date Value Ref Range Status   08/26/2020 4.2 (H) 0.7 - 3.1 x10E3/uL Final     Lymphocytes, Absolute   Date Value Ref Range Status   07/12/2018 3.87 0.90 - 4.80 10*3/mm3 Final     Monocytes Absolute   Date Value Ref Range Status   08/26/2020 0.4 0.1 - 0.9 x10E3/uL Final     Monocytes, Absolute   Date Value Ref Range Status   07/12/2018 0.37 0.20 - 1.20 10*3/mm3 Final     Eosinophils Absolute   Date Value Ref Range Status   08/26/2020 0.1 0.0 - 0.4 x10E3/uL Final     Eosinophils, Absolute   Date Value Ref Range Status   07/12/2018 0.16 0.00 - 0.70 10*3/mm3 Final     Basophils Absolute   Date Value Ref Range Status   08/26/2020 0.1 0.0 - 0.2 x10E3/uL Final     Basophils, Absolute   Date Value Ref Range Status   07/12/2018 0.04 0.00 - 0.20 10*3/mm3 Final     Immature Grans, Absolute   Date Value Ref Range Status   07/12/2018 0.01 0.00 - 0.03 10*3/mm3 Final       Lab Results   Component Value Date    HGBA1C 5.3 08/26/2020       No results found for: [id number]    TSH   Date Value Ref Range Status   12/19/2017 0.915 0.270 - 4.200 mIU/mL Final       Lab Results   Component Value Date    CHOL 150 12/19/2017     Lab Results   Component Value Date    TRIG 61 08/26/2020     Lab Results   Component Value Date    HDL 49 08/26/2020     Lab Results   Component Value Date    LDL 79 08/26/2020     Lab Results   Component Value Date    VLDL 12 08/26/2020     Lab Results   Component Value Date    LDLHDL 2.63 12/19/2017         Procedures    Assessment/Plan   Problems Addressed this Visit     Benign essential hypertension - Primary    Relevant Medications    amLODIPine (NORVASC) 5 MG tablet    Obesity, Class II, BMI 35-39.9      Diagnoses       Codes Comments    Benign essential hypertension    -  Primary ICD-10-CM: I10  ICD-9-CM: 401.1     Obesity, Class II, BMI 35-39.9     ICD-10-CM: E66.9  ICD-9-CM: 278.00       HTN uncontrolled.  Add amlodipine 5mg a day.  Continue metoprolol.    Obesity  uncontrolled.  Counseled on intermittent fasting.      No orders of the defined types were placed in this encounter.      Current Outpatient Medications   Medication Sig Dispense Refill   • metoprolol succinate XL (Toprol XL) 50 MG 24 hr tablet Take 1 tablet by mouth Daily. Takes place of bystolic due to insurance substitution. 90 tablet 3   • multivitamin with minerals (MULTIVITAMIN ADULT PO) Take 2 tablets by mouth Daily.     • amLODIPine (NORVASC) 5 MG tablet Take 1 tablet by mouth Daily. 90 tablet 3   • NON FORMULARY Take 1 capsule by mouth Daily. Bariatric advantage ultra solo       No current facility-administered medications for this visit.       Shruthi Dockery had no medications administered during this visit.    Return in about 3 months (around 7/21/2021).    There are no Patient Instructions on file for this visit.

## 2021-05-17 ENCOUNTER — TELEPHONE (OUTPATIENT)
Dept: FAMILY MEDICINE CLINIC | Facility: CLINIC | Age: 46
End: 2021-05-17

## 2021-05-17 NOTE — TELEPHONE ENCOUNTER
Cut amlodipine in half and take 1/2 a day.  Give it 2 weeks and see if it is better.  See me in 3 weeks with BP readings.

## 2021-05-17 NOTE — TELEPHONE ENCOUNTER
PATIENT CALLED IN STATING THE BP MEDS ARE MAKING HER LEGS AND ANKLES SWELL. PATIENT WOULD LIKE MEDICAL ADVICE.    BEST CALL BACK # [phone]

## 2021-07-27 ENCOUNTER — OFFICE VISIT (OUTPATIENT)
Dept: FAMILY MEDICINE CLINIC | Facility: CLINIC | Age: 46
End: 2021-07-27

## 2021-07-27 VITALS
HEIGHT: 65 IN | BODY MASS INDEX: 37.82 KG/M2 | TEMPERATURE: 97.8 F | DIASTOLIC BLOOD PRESSURE: 90 MMHG | SYSTOLIC BLOOD PRESSURE: 142 MMHG | WEIGHT: 227 LBS | HEART RATE: 60 BPM | OXYGEN SATURATION: 99 %

## 2021-07-27 DIAGNOSIS — Z98.84 S/P LAPAROSCOPIC SLEEVE GASTRECTOMY: ICD-10-CM

## 2021-07-27 DIAGNOSIS — M15.9 PRIMARY OSTEOARTHRITIS INVOLVING MULTIPLE JOINTS: ICD-10-CM

## 2021-07-27 DIAGNOSIS — R73.9 HYPERGLYCEMIA: ICD-10-CM

## 2021-07-27 DIAGNOSIS — I10 BENIGN ESSENTIAL HYPERTENSION: Primary | ICD-10-CM

## 2021-07-27 DIAGNOSIS — K90.49 MALABSORPTION DUE TO INTOLERANCE, NOT ELSEWHERE CLASSIFIED: ICD-10-CM

## 2021-07-27 DIAGNOSIS — D50.9 IRON DEFICIENCY ANEMIA, UNSPECIFIED IRON DEFICIENCY ANEMIA TYPE: ICD-10-CM

## 2021-07-27 PROBLEM — M15.0 PRIMARY OSTEOARTHRITIS INVOLVING MULTIPLE JOINTS: Status: ACTIVE | Noted: 2021-07-27

## 2021-07-27 PROCEDURE — 99214 OFFICE O/P EST MOD 30 MIN: CPT | Performed by: FAMILY MEDICINE

## 2021-07-27 NOTE — PROGRESS NOTES
Chief Complaint   Patient presents with   • Hypertension       Wale Dockery is a 45 y.o. female.     History of Present Illness   FU HTN.  BP running 130/80s.   I had swelling with amlodipine. On metoprolol daily.  LDL 79 11 months ago.     C/o aches and pains in joints.  Off and on.   Worse in shoulders , wrists and knees.   Off and on.     The following portions of the patient's history were reviewed and updated as appropriate: allergies, current medications, past family history, past medical history, past social history, past surgical history and problem list.    Review of Systems   Constitutional: Negative for appetite change and fatigue.   HENT: Negative for nosebleeds and sore throat.    Eyes: Negative for blurred vision and visual disturbance.   Respiratory: Negative for shortness of breath and wheezing.    Cardiovascular: Negative for chest pain and leg swelling.   Gastrointestinal: Negative for abdominal distention and abdominal pain.   Endocrine: Negative for cold intolerance and polyuria.   Genitourinary: Negative for dysuria and hematuria.   Musculoskeletal: Negative for arthralgias and myalgias.   Skin: Negative for color change and rash.   Neurological: Negative for weakness and confusion.   Psychiatric/Behavioral: Negative for agitation and depressed mood.       Patient Active Problem List   Diagnosis   • Chronic fatigue   • Edema   • TERRI (obstructive sleep apnea)   • Dietary counseling   • Obesity, Class II, BMI 35-39.9   • Allergic rhinitis   • Anemia   • Benign essential hypertension   • BPPV (benign paroxysmal positional vertigo)   • Hyperglycemia   • Wheezing   • Palpitations   • S/P laparoscopic sleeve gastrectomy   • Upper respiratory infection   • Encounter for annual health examination   • Nasal congestion   • Close exposure to COVID-19 virus   • Primary osteoarthritis involving multiple joints   • Malabsorption due to intolerance, not elsewhere classified       Allergies    Allergen Reactions   • Lisinopril-Hydrochlorothiazide Cough   • Penicillins Hives   • Skelaxin [Metaxalone] Hives   • Sulfa Antibiotics Hives         Current Outpatient Medications:   •  metoprolol succinate XL (Toprol XL) 50 MG 24 hr tablet, Take 1 tablet by mouth Daily. Takes place of [facility] due to insurance substitution., Disp: 90 tablet, Rfl: 3  •  multivitamin with minerals (MULTIVITAMIN ADULT PO), Take 2 tablets by mouth Daily., Disp: , Rfl:     Past Medical History:   Diagnosis Date   • Abnormal uterine bleeding    • Acute foot pain, left    • Allergic rhinitis    • Anemia    • Ankle joint pain    • Bacterial vaginosis    • Benign essential hypertension    • BMI 50.0-59.9, adult (CMS/[facility])    • BPPV (benign paroxysmal positional vertigo)    • Daytime hypersomnolence    • Edema    • Hemorrhoids    • History of blood transfusion    • History of chickenpox    • Hx of bariatric surgery    • Hyperglycemia    • Hypocalcemia    • Irregular menstrual cycle    • Irritability and anger    • Macromastia    • Menorrhagia    • Obesity    • Obstructive sleep apnea syndrome    • Optic nerve swelling    • Palpitations    • Sleep difficulties    • Snoring    • Swelling    • Swelling of both ankles    • Trapezius muscle spasm    • Weight gain    • Wheezing        Past Surgical History:   Procedure Laterality Date   • GASTRIC SLEEVE LAPAROSCOPIC N/A 4/18/2018    Procedure: GASTRIC SLEEVE LAPAROSCOPIC;  Surgeon: Christ Sunshine Jr., MD;  Location: [facility];  Service: Bariatric   • TUBAL ABDOMINAL LIGATION     • VAGINAL HYSTERECTOMY         Family History   Problem Relation Age of Onset   • Obesity Mother    • Hypertension Mother    • Stroke Mother    • Coronary artery disease Mother    • Sleep apnea Mother    • Hypertension Sister    • Hypertension Maternal Grandfather    • Malig Hyperthermia Neg Hx        Social History     Tobacco Use   • Smoking status: Never Smoker   • Smokeless tobacco: Never Used   Substance Use  Topics   • Alcohol use: No            Objective     Vitals:    07/27/21 1415   BP: 142/90   Pulse: 60   Temp: 97.8 °F (36.6 °C)   SpO2: 99%     Body mass index is 37.77 kg/m².    Physical Exam    Lab Results   Component Value Date    GLUCOSE 88 07/12/2018    BUN 14 08/26/2020    CREATININE 1.24 (H) 08/26/2020    EGFRIFNONA 52 (L) 08/26/2020    EGFRIFAFRI 60 08/26/2020    BCR 11 08/26/2020    K 4.4 08/26/2020    CO2 26 08/26/2020    CALCIUM 9.2 08/26/2020    PROTENTOTREF 6.3 08/26/2020    ALBUMIN 4.1 08/26/2020    LABIL2 1.9 08/26/2020    AST 16 08/26/2020    ALT 10 08/26/2020       WBC   Date Value Ref Range Status   08/26/2020 8.1 3.4 - 10.8 x10E3/uL Final     RBC   Date Value Ref Range Status   08/26/2020 4.40 3.77 - 5.28 x10E6/uL Final     Hemoglobin   Date Value Ref Range Status   08/26/2020 13.2 11.1 - 15.9 g/dL Final   07/12/2018 12.3 11.9 - 15.5 g/dL Final     Hematocrit   Date Value Ref Range Status   08/26/2020 38.8 34.0 - 46.6 % Final   07/12/2018 36.2 35.6 - 45.5 % Final     MCV   Date Value Ref Range Status   08/26/2020 88 79 - 97 fL Final   07/12/2018 83.8 80.5 - 98.2 fL Final     MCH   Date Value Ref Range Status   08/26/2020 30.0 26.6 - 33.0 pg Final   07/12/2018 28.5 26.9 - 32.0 pg Final     MCHC   Date Value Ref Range Status   08/26/2020 34.0 31.5 - 35.7 g/dL Final   07/12/2018 34.0 32.4 - 36.3 g/dL Final     RDW   Date Value Ref Range Status   08/26/2020 13.1 11.7 - 15.4 % Final   07/12/2018 15.3 (H) 11.7 - 13.0 % Final     RDW-SD   Date Value Ref Range Status   07/12/2018 47.3 37.0 - 54.0 fl Final     MPV   Date Value Ref Range Status   07/12/2018 10.6 6.0 - 12.0 fL Final     Platelets   Date Value Ref Range Status   08/26/2020 301 150 - 450 x10E3/uL Final   07/12/2018 371 140 - 500 10*3/mm3 Final     Neutrophil Rel %   Date Value Ref Range Status   08/26/2020 41 Not Estab. % Final     Neutrophil %   Date Value Ref Range Status   07/12/2018 47.4 42.7 - 76.0 % Final     Lymphocyte Rel %   Date  Value Ref Range Status   08/26/2020 52 Not Estab. % Final     Lymphocyte %   Date Value Ref Range Status   07/12/2018 45.8 (H) 19.6 - 45.3 % Final     Monocyte Rel %   Date Value Ref Range Status   08/26/2020 5 Not Estab. % Final     Monocyte %   Date Value Ref Range Status   07/12/2018 4.4 (L) 5.0 - 12.0 % Final     Eosinophil Rel %   Date Value Ref Range Status   08/26/2020 1 Not Estab. % Final     Eosinophil %   Date Value Ref Range Status   07/12/2018 1.9 0.3 - 6.2 % Final     Basophil Rel %   Date Value Ref Range Status   08/26/2020 1 Not Estab. % Final     Basophil %   Date Value Ref Range Status   07/12/2018 0.5 0.0 - 1.5 % Final     Immature Grans %   Date Value Ref Range Status   07/12/2018 0.1 0.0 - 0.5 % Final     Neutrophils Absolute   Date Value Ref Range Status   08/26/2020 3.3 1.4 - 7.0 x10E3/uL Final     Neutrophils, Absolute   Date Value Ref Range Status   07/12/2018 4.01 1.90 - 8.10 10*3/mm3 Final     Lymphocytes Absolute   Date Value Ref Range Status   08/26/2020 4.2 (H) 0.7 - 3.1 x10E3/uL Final     Lymphocytes, Absolute   Date Value Ref Range Status   07/12/2018 3.87 0.90 - 4.80 10*3/mm3 Final     Monocytes Absolute   Date Value Ref Range Status   08/26/2020 0.4 0.1 - 0.9 x10E3/uL Final     Monocytes, Absolute   Date Value Ref Range Status   07/12/2018 0.37 0.20 - 1.20 10*3/mm3 Final     Eosinophils Absolute   Date Value Ref Range Status   08/26/2020 0.1 0.0 - 0.4 x10E3/uL Final     Eosinophils, Absolute   Date Value Ref Range Status   07/12/2018 0.16 0.00 - 0.70 10*3/mm3 Final     Basophils Absolute   Date Value Ref Range Status   08/26/2020 0.1 0.0 - 0.2 x10E3/uL Final     Basophils, Absolute   Date Value Ref Range Status   07/12/2018 0.04 0.00 - 0.20 10*3/mm3 Final     Immature Grans, Absolute   Date Value Ref Range Status   07/12/2018 0.01 0.00 - 0.03 10*3/mm3 Final       Lab Results   Component Value Date    HGBA1C 5.3 08/26/2020       No results found for: [id number]    TSH   Date Value Ref  Range Status   12/19/2017 0.915 0.270 - 4.200 mIU/mL Final       Lab Results   Component Value Date    CHOL 150 12/19/2017     Lab Results   Component Value Date    TRIG 61 08/26/2020     Lab Results   Component Value Date    HDL 49 08/26/2020     Lab Results   Component Value Date    LDL 79 08/26/2020     Lab Results   Component Value Date    VLDL 12 08/26/2020     Lab Results   Component Value Date    LDLHDL 2.63 12/19/2017         Procedures    Assessment/Plan   Problems Addressed this Visit     Anemia    Relevant Orders    CBC & Differential    Ferritin    Iron    Benign essential hypertension - Primary    Hyperglycemia    Relevant Orders    Comprehensive Metabolic Panel    Hemoglobin A1c    Malabsorption due to intolerance, not elsewhere classified    Relevant Orders    Comprehensive Metabolic Panel    Vitamin B12    Primary osteoarthritis involving multiple joints    S/P laparoscopic sleeve gastrectomy      Diagnoses       Codes Comments    Benign essential hypertension    -  Primary ICD-10-CM: I10  ICD-9-CM: 401.1     Primary osteoarthritis involving multiple joints     ICD-10-CM: M89.49  ICD-9-CM: 715.98     Hyperglycemia     ICD-10-CM: R73.9  ICD-9-CM: 790.29     Iron deficiency anemia, unspecified iron deficiency anemia type     ICD-10-CM: D50.9  ICD-9-CM: 280.9     S/P laparoscopic sleeve gastrectomy     ICD-10-CM: Z98.84  ICD-9-CM: V45.86     Malabsorption due to intolerance, not elsewhere classified     ICD-10-CM: K90.49  ICD-9-CM: 579.8       HTN.  Controlled.  Continue metoprolol.    OA.  Uncontrolled.  Use tylenol prn.    Malabsorption s/p gastric sleeve.  Check labs as above.       Orders Placed This Encounter   Procedures   • Ferritin   • Iron   • Comprehensive Metabolic Panel     Order Specific Question:   Release to patient     Answer:   Immediate   • Vitamin B12     Order Specific Question:   Release to patient     Answer:   Immediate   • Hemoglobin A1c     Order Specific Question:   Release to  patient     Answer:   Immediate   • CBC & Differential     Order Specific Question:   Manual Differential     Answer:   No       Current Outpatient Medications   Medication Sig Dispense Refill   • metoprolol succinate XL (Toprol XL) 50 MG 24 hr tablet Take 1 tablet by mouth Daily. Takes place of bystolic due to insurance substitution. 90 tablet 3   • multivitamin with minerals (MULTIVITAMIN ADULT PO) Take 2 tablets by mouth Daily.       No current facility-administered medications for this visit.       Shruthi Dockery had no medications administered during this visit.    Return in about 6 months (around 1/27/2022).    There are no Patient Instructions on file for this visit.

## 2021-07-28 LAB
ALBUMIN SERPL-MCNC: 4.4 G/DL (ref 3.5–5.2)
ALBUMIN/GLOB SERPL: 1.6 G/DL
ALP SERPL-CCNC: 77 U/L (ref 39–117)
ALT SERPL-CCNC: 11 U/L (ref 1–33)
AST SERPL-CCNC: 16 U/L (ref 1–32)
BASOPHILS # BLD AUTO: 0.05 10*3/MM3 (ref 0–0.2)
BASOPHILS NFR BLD AUTO: 0.7 % (ref 0–1.5)
BILIRUB SERPL-MCNC: 0.4 MG/DL (ref 0–1.2)
BUN SERPL-MCNC: 15 MG/DL (ref 6–20)
BUN/CREAT SERPL: 15.8 (ref 7–25)
CALCIUM SERPL-MCNC: 9.5 MG/DL (ref 8.6–10.5)
CHLORIDE SERPL-SCNC: 104 MMOL/L (ref 98–107)
CO2 SERPL-SCNC: 27.3 MMOL/L (ref 22–29)
CREAT SERPL-MCNC: 0.95 MG/DL (ref 0.57–1)
EOSINOPHIL # BLD AUTO: 0.12 10*3/MM3 (ref 0–0.4)
EOSINOPHIL NFR BLD AUTO: 1.7 % (ref 0.3–6.2)
ERYTHROCYTE [DISTWIDTH] IN BLOOD BY AUTOMATED COUNT: 13.2 % (ref 12.3–15.4)
FERRITIN SERPL-MCNC: 91.3 NG/ML (ref 13–150)
GLOBULIN SER CALC-MCNC: 2.7 GM/DL
GLUCOSE SERPL-MCNC: 82 MG/DL (ref 65–99)
HBA1C MFR BLD: 5.2 % (ref 4.8–5.6)
HCT VFR BLD AUTO: 42 % (ref 34–46.6)
HGB BLD-MCNC: 13.6 G/DL (ref 12–15.9)
IMM GRANULOCYTES # BLD AUTO: 0.04 10*3/MM3 (ref 0–0.05)
IMM GRANULOCYTES NFR BLD AUTO: 0.6 % (ref 0–0.5)
IRON SERPL-MCNC: 40 MCG/DL (ref 37–145)
LYMPHOCYTES # BLD AUTO: 3.15 10*3/MM3 (ref 0.7–3.1)
LYMPHOCYTES NFR BLD AUTO: 43.9 % (ref 19.6–45.3)
MCH RBC QN AUTO: 28.5 PG (ref 26.6–33)
MCHC RBC AUTO-ENTMCNC: 32.4 G/DL (ref 31.5–35.7)
MCV RBC AUTO: 88.1 FL (ref 79–97)
MONOCYTES # BLD AUTO: 0.35 10*3/MM3 (ref 0.1–0.9)
MONOCYTES NFR BLD AUTO: 4.9 % (ref 5–12)
NEUTROPHILS # BLD AUTO: 3.47 10*3/MM3 (ref 1.7–7)
NEUTROPHILS NFR BLD AUTO: 48.2 % (ref 42.7–76)
NRBC BLD AUTO-RTO: 0 /100 WBC (ref 0–0.2)
PLATELET # BLD AUTO: 370 10*3/MM3 (ref 140–450)
POTASSIUM SERPL-SCNC: 4.3 MMOL/L (ref 3.5–5.2)
PROT SERPL-MCNC: 7.1 G/DL (ref 6–8.5)
RBC # BLD AUTO: 4.77 10*6/MM3 (ref 3.77–5.28)
SODIUM SERPL-SCNC: 142 MMOL/L (ref 136–145)
VIT B12 SERPL-MCNC: 844 PG/ML (ref 211–946)
WBC # BLD AUTO: 7.18 10*3/MM3 (ref 3.4–10.8)

## 2021-12-25 NOTE — DISCHARGE INSTRUCTIONS
GOING HOME AFTER GASTRIC SLEEVE/ GASTRIC BYPASS SURGERY  [facility] Weight Loss: Post-Operative Information/Instructions  Christ Sunshine Jr., MD  General Patient Instructions for Discharge   - Call Surgeon's office at [phone] for follow-up appointment.    - Be sure you, the patient, have a follow-up appointment to be seen within three (3) days after discharge. If not, please call [phone] to schedule an appointment. If you are discharged on a Saturday or Sunday, please call Monday to schedule the appointment.  - Contact the Surgeon at [phone] for any questions or concerns, including temperature greater than or equal to 101F, shortness of breath, leg swelling, redness at incision sites, nausea, vomiting, chills, or problems or questions.    - Follow the Gastric Stage 1 Diet    à Clear liquids, room temperature, sugar-free, caffeine-free, non-carbonated, 70 grams of protein, No Straws.  - You may shower. No tub bath for 2 weeks.  - No lifting, pushing, pulling, or tugging >25 pounds for 3 weeks.  - Ambulate 4 x per day minimum, increase distance daily.  - For the next several weeks, you are at an increased risk for blood clot formation. Therefore, you should walk regularly. You should not sit for prolonged periods of time, more than 45 minutes, without getting up and walking for 5-10 minutes. This includes any car rides, including the drive home from the hospital. If driving any distance greater than 30 miles over the next two (2) weeks, stop every 30-45 minutes and walk for 5-10 minutes each time.  - Continue using Incentive Spirometer and coughing exercises at least every two (2) hours while awake for one week.  - If you, the patient, use CPAP/BIPAP for diagnosis of sleep apnea, you may resume use tonight at home.  - No driving or operating machinery allowed while taking narcotic (prescription) pain medication, and until you feel comfortable forcefully applying the brakes if needed. (This  usually takes more than 3 days.)    - Make an appointment with your Primary Care Physician within one week post-op to look at your home medications for possible changes or discontinuity.   Medications  - The nurse will provide a list of medications for you to continue at home   - If you received a Lovenox (Enoxaparin) or Apixiban (Eliquis) prescription at pre-op visit with Surgeon, start taking the medicine the morning after discharge unless directed otherwise.    - If you were prescribed Lovenox (Enoxaparin), review the education/teaching material/video with the nurse.    - Take post op pain meds as prescribed as needed.   - Continue Foltx until finished.   - Start Actigall (Ursodiol) one (1) week after surgery if patient still has gallbladder. You should have been given a prescription at your pre-op visit. Contact the office if you do not have the prescription.   - Start bariatric vitamin regimen as instructed in pre-op education with bariatric coordinator.    - Zegerid or Prilosec OTC (or generic) by mouth once daily for four (4) weeks unless you are already taking a proton pump inhibitor as home medication. Follow dosing instructions on package.   Nausea/Vomiting:  The following are possible causes for nausea/vomiting:  - Drinking too much or too fast.  - Sinus drainage/post nasal drip for allergy sufferers (you may take Sudafed, Claritin, Tylenol Sinus/Allergy, or other decongestants and nose sprays to help with this discomfort).  - Low blood sugar (sweating, shaky, irritable, weakness, dizzy or tunnel-vision) - treatment is to sip 100% fruit juice - no sugar added until symptoms subside.  - Acid in fruit juice - (may dilute with water or avoid).  - Eating or drinking something that is not on clear liquid (stage 1) diet.  Any nausea/vomiting that prohibits you from keeping fluids down for greater than 24 hours requires a call to the surgeon's office.  Urine:  Use your urine color as a guide to determine if you  are drinking enough fluid. The darker the urine, the more fluids you need to drink. Urine should be clear to light yellow if you are getting enough fluid. If you should experience frequency, burning or pain with urination, blood in urine, contact us or your primary care physician for possible UTI (urinary tract infection), which could require antibiotics (liquid preferred).  Bowel Movements:  You may not have a bowel movement for 2-5 days after going home. You may then experience liquid, runny or loose stools for approximately 3-4 weeks following surgery. This would require you to drink even more fluids to prevent dehydration. Some patients may experience constipation, which can be treated with increased fluids, drinking warm liquids, increased activity and the use of a Fleets Enema, Milk of Magnesia, or suppositories. The first couple of bowel movements could be bloody, tarry black or dark maroon in color. This is OK as long as the stool returns to a normal color in 1-2 days. If however, you have frequent or a large amount of bloody or tarry black stools and/or become light-headed or dizzy, you may be bleeding and require urgent attention. Please call us right away.  Abdominal Incisions:  You will have small incisions. Do not scrub incisions, but allow the warm, soapy water to run over the incisions, rinse well, and pat dry. You may use any brand of anti-bacterial soap. Do not use Peroxide or Neosporin type ointments on sites, unless instructed to do so by a surgeon or nurse. Monitor daily for signs/symptoms of infection, which might include: drainage with a foul odor, pain, redness, swelling or heat at the incision sight; fever, body aches and chills. If you suspect infection or have a fever, give us a call.  Pain:  You will be given a prescription for pain medication to control your pain. If you feel the dose is too strong, you may take half the ordered dose, or you may take Tylenol adult liquid per package  instructions for minor pain. Do not take any medications that contain aspirin or aspirin products.  Do not take medications like: Motrin, Aleve, Ibuprofen, Advil, Naproxen, Celebrex, Daypro, Bextra, Meloxicam or other medications commonly used for arthritis or joint pain.  No steroids or cortisone injections. There may be pain, which should improve every few days. Pain should not suddenly get worse or more intense. Pain that suddenly changes and is constant and severe should be called in to the surgeon's office. Any sudden pain in the lower extremities with associated warmth and redness should be called in to the surgeon's office immediately. Do not rub or massage this area, as it could be a blood clot.  Diet:  Remain on the clear liquid diet (stage 1) per your  which includes 70 grams of protein each day, sugar free, non carbonated and no straws. Day 1 is the day of surgery. If you are tolerating the stage 1 diet, you may then proceed to stage 2 diet, as instructed in the . Do not progress to the stage 2 diet if you are having nausea/vomiting. Refer to the Basic Nutrition and Food Principles guide.  Medications:  The nurse will let you know which medications you will need to continue once you go home. Do not take any medications that are extended or time released if you had the gastric bypass procedure, OK to take if you had the gastric sleeve procedure. Large capsules can be opened and diluted with clear liquids. Check with your physician or pharmacist as to which pills may be crushed and which capsules may be opened and diluted safely. Continue taking Foltx as surgeon orders. If you still have your gall bladder and were prescribed Actigall (Ursodiol), you may start this medication one week after your surgery. You will remain on Actigall (Ursodiol) for approximately 6 months. The dose is 1 pill, 2 times each day for 6 months.  Activity:  Continue your deep breathing and coughing  exercises with your Incentive Spirometer breathing device at least every 2 hours while awake (10 repetitions each time) for one week. May use CPAP. This will help to prevent respiratory problems such as pneumonia. No lifting, pulling or tugging anything over 25 pounds for 3 weeks after surgery. You may shower but no tub baths, hot tubs or swimming for 2 weeks. Moderate walking is recommended every 2 hours and at least 4 times per day minimum, increase distance daily. Further exercise will be discussed at the first post-op visit. No driving or operating machinery allow until off narcotic pain medication and until you feel comfortable forcefully applying the brakes (usually takes 3 or more days). For the next few weeks you are at an increased risk for blood clot formation. Therefore you should walk regularly and you should not sit for prolonged periods of time, more than 45 minutes without getting up and walking for 5-10 minutes. This includes car rides. Including riding home from the hospital. If riding a distance greater than 30 miles over the next 2 weeks stop every 30-45 minutes and walk 5-10 mintues each time. No tanning bed use for 8 weeks after surgery and in general, not recommended due to the increased risk for skin cancer. Incisions will burn/blister very badly with tanning bed use.  Illness:  Your primary care physician should treat general illness such as ear infections, sinus infections, and viral type illnesses, etc. Medications prescribed should be liquid/elixir form when possible, for the first 30 days.  General:  In general, it is recommended that you weigh yourself no more than once per week. Let the weight come off you and concentrate on more important things. Remember the weight was not gained overnight, nor will it be lost overnight. Gastric Bypass/ Gastric Sleeve weight loss will continue over a period of 12-18 months. Do not  yourself according to how others are doing after surgery, as this  will cause unnecessary discouragement.  THE ABOVE ARE GENERAL GUIDELINES TO ASSIST YOU ONCE HOME, IF YOU ARE IN DOUBT, OR YOU HAVE ANY QUESTIONS, CALL US AT THE NUMBERS LISTED BELOW.  IN THE EVENT OF SUDDEN CHEST PAIN, SHORTNESS OF BREATH, OR ANY LIFE THREATENING CONDITION, CALL 911.  Any time you are evaluated or admitted to another facility, please have someone notify the surgeon's office.  Supplements:  70 grams of protein taken EVERY DAY. Remember to drink at least 64 ounces of fluid a day, sipping slowly early on. Increase this amount during the summertime. Sipping slowly will not stretch your new stomach. Drinking too fast or gulping liquids will cause brief discomfort and early could cause staple line disruption (leak). With eating, tiny bites, then chew, chew, chew, and swallow. Lay your fork/spoon down for 2-3 minutes, and then take your next bite. Your pouch will tell you within 1-2 bites if it is going to tolerate what you are eating.   Protein Vendors:  Refer to protein vendors' handout from consult class. You can always find protein drinks at the bariatric office, grocery stores, [employer], drug [employer], [employer], health food stores, and on the Internet. Find one high in protein (15-30 grams per serving) and low carb (less than 18 grams per serving).  Now is a great time to re-read your . Please review specific instructions given to you at discharge by your physician (surgeon).  HOW/WHEN TO CONTACT US:  It is imperative that you contact us with any of the following:    Ÿ fever greater than 101 degrees  Ÿ shortness of breath  Ÿ leg swelling  Ÿ body aches  Ÿ shaking chills  Ÿ nausea and vomitting  Ÿ pain that has worsened  Ÿ redness at incision sites  Ÿ pus or foul smelling drainage from an incision or wound  Ÿ inability to keep fluids down for more than a day  Ÿ any other condition you feel needs our attention.  [facility]ariatric: [phone] call this number anytime 24 hours  a day / 7 days a week.  Teach-back Questions to be answered by the patient prior to discharge.   What complications would prompt you to call your doctor when you return home? _________________    What is the purpose of your prescribed medication? ________________  What are some potential side effects of the medications you will be taking at home? _______________                         There are no Wet Read(s) to document.

## 2022-01-03 DIAGNOSIS — I10 BENIGN ESSENTIAL HYPERTENSION: ICD-10-CM

## 2022-01-03 RX ORDER — METOPROLOL SUCCINATE 50 MG/1
50 TABLET, EXTENDED RELEASE ORAL DAILY
Qty: 90 TABLET | Refills: 3 | Status: SHIPPED | OUTPATIENT
Start: 2022-01-03 | End: 2022-09-28 | Stop reason: SDUPTHER

## 2022-01-26 ENCOUNTER — OFFICE VISIT (OUTPATIENT)
Dept: FAMILY MEDICINE CLINIC | Facility: CLINIC | Age: 47
End: 2022-01-26

## 2022-01-26 VITALS
SYSTOLIC BLOOD PRESSURE: 130 MMHG | WEIGHT: 232 LBS | OXYGEN SATURATION: 100 % | HEIGHT: 65 IN | BODY MASS INDEX: 38.65 KG/M2 | TEMPERATURE: 97.5 F | DIASTOLIC BLOOD PRESSURE: 72 MMHG | HEART RATE: 67 BPM

## 2022-01-26 DIAGNOSIS — Z00.00 ENCOUNTER FOR ANNUAL HEALTH EXAMINATION: Primary | ICD-10-CM

## 2022-01-26 DIAGNOSIS — I10 BENIGN ESSENTIAL HYPERTENSION: ICD-10-CM

## 2022-01-26 DIAGNOSIS — R73.9 HYPERGLYCEMIA: ICD-10-CM

## 2022-01-26 DIAGNOSIS — Z11.59 ENCOUNTER FOR HEPATITIS C SCREENING TEST FOR LOW RISK PATIENT: ICD-10-CM

## 2022-01-26 DIAGNOSIS — D50.9 IRON DEFICIENCY ANEMIA, UNSPECIFIED IRON DEFICIENCY ANEMIA TYPE: ICD-10-CM

## 2022-01-26 PROCEDURE — 99214 OFFICE O/P EST MOD 30 MIN: CPT | Performed by: FAMILY MEDICINE

## 2022-01-26 PROCEDURE — 99396 PREV VISIT EST AGE 40-64: CPT | Performed by: FAMILY MEDICINE

## 2022-01-27 LAB
ALBUMIN SERPL-MCNC: 4 G/DL (ref 3.8–4.8)
ALBUMIN/GLOB SERPL: 1.7 {RATIO} (ref 1.2–2.2)
ALP SERPL-CCNC: 65 IU/L (ref 44–121)
ALT SERPL-CCNC: 12 IU/L (ref 0–32)
AST SERPL-CCNC: 14 IU/L (ref 0–40)
BASOPHILS # BLD AUTO: 0.1 X10E3/UL (ref 0–0.2)
BASOPHILS NFR BLD AUTO: 1 %
BILIRUB SERPL-MCNC: 0.6 MG/DL (ref 0–1.2)
BUN SERPL-MCNC: 6 MG/DL (ref 6–24)
BUN/CREAT SERPL: 6 (ref 9–23)
CALCIUM SERPL-MCNC: 8.9 MG/DL (ref 8.7–10.2)
CHLORIDE SERPL-SCNC: 103 MMOL/L (ref 96–106)
CHOLEST SERPL-MCNC: 144 MG/DL (ref 100–199)
CHOLEST/HDLC SERPL: 2.7 RATIO (ref 0–4.4)
CO2 SERPL-SCNC: 24 MMOL/L (ref 20–29)
CREAT SERPL-MCNC: 0.98 MG/DL (ref 0.57–1)
EOSINOPHIL # BLD AUTO: 0.1 X10E3/UL (ref 0–0.4)
EOSINOPHIL NFR BLD AUTO: 2 %
ERYTHROCYTE [DISTWIDTH] IN BLOOD BY AUTOMATED COUNT: 13.8 % (ref 11.7–15.4)
GLOBULIN SER CALC-MCNC: 2.3 G/DL (ref 1.5–4.5)
GLUCOSE SERPL-MCNC: 73 MG/DL (ref 65–99)
HBA1C MFR BLD: 5.4 % (ref 4.8–5.6)
HCT VFR BLD AUTO: 38.8 % (ref 34–46.6)
HCV AB S/CO SERPL IA: <0.1 S/CO RATIO (ref 0–0.9)
HDLC SERPL-MCNC: 54 MG/DL
HGB BLD-MCNC: 12.9 G/DL (ref 11.1–15.9)
IMM GRANULOCYTES # BLD AUTO: 0 X10E3/UL (ref 0–0.1)
IMM GRANULOCYTES NFR BLD AUTO: 0 %
LDLC SERPL CALC-MCNC: 76 MG/DL (ref 0–99)
LYMPHOCYTES # BLD AUTO: 2.3 X10E3/UL (ref 0.7–3.1)
LYMPHOCYTES NFR BLD AUTO: 39 %
MCH RBC QN AUTO: 28.6 PG (ref 26.6–33)
MCHC RBC AUTO-ENTMCNC: 33.2 G/DL (ref 31.5–35.7)
MCV RBC AUTO: 86 FL (ref 79–97)
MONOCYTES # BLD AUTO: 0.4 X10E3/UL (ref 0.1–0.9)
MONOCYTES NFR BLD AUTO: 6 %
NEUTROPHILS # BLD AUTO: 3.1 X10E3/UL (ref 1.4–7)
NEUTROPHILS NFR BLD AUTO: 52 %
PLATELET # BLD AUTO: 329 X10E3/UL (ref 150–450)
POTASSIUM SERPL-SCNC: 4.3 MMOL/L (ref 3.5–5.2)
PROT SERPL-MCNC: 6.3 G/DL (ref 6–8.5)
RBC # BLD AUTO: 4.51 X10E6/UL (ref 3.77–5.28)
SODIUM SERPL-SCNC: 141 MMOL/L (ref 134–144)
TRIGL SERPL-MCNC: 73 MG/DL (ref 0–149)
VLDLC SERPL CALC-MCNC: 14 MG/DL (ref 5–40)
WBC # BLD AUTO: 6 X10E3/UL (ref 3.4–10.8)

## 2022-06-17 NOTE — PROGRESS NOTES
Patient here for annual physical exam    Wale Dockery is a 46 y.o. female.     History of Present Illness   46 year old AA female here for annual.   The following portions of the patient's history were reviewed and updated as appropriate: allergies, current medications, past family history, past medical history, past social history, past surgical history and problem list    Last colonoscopy:2020  Optometry:UTD  Last PSA(if applicable):NA  Last mammo(if applicable):UTD.    F/U HTN.  Doing well with meds.  No Se.    F/U anemia.  Hx of hysterectomy.  No vaginal bleeding.  C scope 2020.      Immunization History   Administered Date(s) Administered   • COVID-19 (PFIZER) PURPLE CAP 03/21/2021, 04/10/2021   • Flu Vaccine Intradermal Quad 18-64YR 11/19/2018   • Flu Vaccine Split Quad 11/19/2018, 11/19/2018, 10/03/2019, 09/18/2020   • FluLaval/Fluarix/Fluzone >6 10/03/2019, 09/18/2020   • Hepatitis A 05/03/2018, 11/19/2018   • Tdap 08/26/2020       Review of Systems   Constitutional: Negative for appetite change and fatigue.   HENT: Negative for nosebleeds and sore throat.    Eyes: Negative for blurred vision and visual disturbance.   Respiratory: Negative for shortness of breath and wheezing.    Cardiovascular: Negative for chest pain and leg swelling.   Gastrointestinal: Negative for abdominal distention and abdominal pain.   Endocrine: Negative for cold intolerance and polyuria.   Genitourinary: Negative for dysuria and hematuria.   Musculoskeletal: Negative for arthralgias and myalgias.   Skin: Negative for color change and rash.   Neurological: Negative for weakness and confusion.   Psychiatric/Behavioral: Negative for agitation and depressed mood.       Patient Active Problem List   Diagnosis   • Chronic fatigue   • Edema   • TERRI (obstructive sleep apnea)   • Dietary counseling   • Obesity, Class II, BMI 35-39.9   • Allergic rhinitis   • Anemia   • Benign essential hypertension   • BPPV (benign  paroxysmal positional vertigo)   • Hyperglycemia   • Wheezing   • Palpitations   • S/P laparoscopic sleeve gastrectomy   • Upper respiratory infection   • Encounter for annual health examination   • Nasal congestion   • Close exposure to COVID-19 virus   • Primary osteoarthritis involving multiple joints   • Malabsorption due to intolerance, not elsewhere classified   • Encounter for hepatitis C screening test for low risk patient       Allergies   Allergen Reactions   • Lisinopril-Hydrochlorothiazide Cough   • Penicillins Hives   • Skelaxin [Metaxalone] Hives   • Sulfa Antibiotics Hives         Current Outpatient Medications:   •  metoprolol succinate XL (TOPROL-XL) 50 MG 24 hr tablet, TAKE 1 TABLET BY MOUTH DAILY. [facility] Disp: 90 tablet, Rfl: 3  •  multivitamin with minerals (MULTIVITAMIN ADULT PO), Take 2 tablets by mouth Daily., Disp: , Rfl:     Past Medical History:   Diagnosis Date   • Abnormal uterine bleeding    • Acute foot pain, left    • Allergic rhinitis    • Anemia    • Ankle joint pain    • Bacterial vaginosis    • Benign essential hypertension    • BMI 50.0-59.9, adult (HCC)    • BPPV (benign paroxysmal positional vertigo)    • Daytime hypersomnolence    • Edema    • Hemorrhoids    • History of blood transfusion    • History of chickenpox    • Hx of bariatric surgery    • Hyperglycemia    • Hypocalcemia    • Irregular menstrual cycle    • Irritability and anger    • Macromastia    • Menorrhagia    • Obesity    • Obstructive sleep apnea syndrome    • Optic nerve swelling    • Palpitations    • Sleep difficulties    • Snoring    • Swelling    • Swelling of both ankles    • Trapezius muscle spasm    • Weight gain    • Wheezing        Past Surgical History:   Procedure Laterality Date   • BUNIONECTOMY Left 11/30/2021    left foot   • GASTRIC SLEEVE LAPAROSCOPIC N/A 4/18/2018    Procedure: GASTRIC SLEEVE LAPAROSCOPIC;  Surgeon: Christ Sunshine Jr., MD;   Location: [facility] OR [facility];  Service: Bariatric   • TUBAL ABDOMINAL LIGATION     • VAGINAL HYSTERECTOMY         Family History   Problem Relation Age of Onset   • Obesity Mother    • Hypertension Mother    • Stroke Mother    • Coronary artery disease Mother    • Sleep apnea Mother    • Hypertension Sister    • Hypertension Maternal Grandfather    • Malig Hyperthermia Neg Hx        Social History     Tobacco Use   • Smoking status: Never Smoker   • Smokeless tobacco: Never Used   Substance Use Topics   • Alcohol use: No            Objective     Vitals:    01/26/22 0833   BP: 130/72   Pulse: 67   Temp: 97.5 °F (36.4 °C)   SpO2: 100%     Body mass index is 38.61 kg/m².    Physical Exam  Vitals reviewed.   Constitutional:       Appearance: She is well-developed. She is not diaphoretic.   HENT:      Head: Normocephalic and atraumatic.   Eyes:      General: No scleral icterus.     Pupils: Pupils are equal, round, and reactive to light.   Neck:      Thyroid: No thyromegaly.   Cardiovascular:      Rate and Rhythm: Normal rate and regular rhythm.      Heart sounds: No murmur heard.  No friction rub. No gallop.    Pulmonary:      Effort: Pulmonary effort is normal. No respiratory distress.      Breath sounds: No wheezing or rales.   Chest:      Chest wall: No tenderness.   Abdominal:      General: Bowel sounds are normal. There is no distension.      Palpations: Abdomen is soft.      Tenderness: There is no abdominal tenderness.   Musculoskeletal:         General: No deformity. Normal range of motion.   Lymphadenopathy:      Cervical: No cervical adenopathy.   Skin:     General: Skin is warm and dry.      Findings: No rash.   Neurological:      Cranial Nerves: No cranial nerve deficit.      Motor: No abnormal muscle tone.         Lab Results   Component Value Date    GLUCOSE 82 07/27/2021    BUN 15 07/27/2021    CREATININE 0.95 07/27/2021    EGFRIFNONA 63 07/27/2021    EGFRIFAFRI 76 07/27/2021    BCR 15.8 07/27/2021    K 4.3  07/27/2021    CO2 27.3 07/27/2021    CALCIUM 9.5 07/27/2021    PROTENTOTREF 7.1 07/27/2021    ALBUMIN 4.40 07/27/2021    LABIL2 1.6 07/27/2021    AST 16 07/27/2021    ALT 11 07/27/2021       WBC   Date Value Ref Range Status   07/27/2021 7.18 3.40 - 10.80 10*3/mm3 Final     RBC   Date Value Ref Range Status   07/27/2021 4.77 3.77 - 5.28 10*6/mm3 Final     Hemoglobin   Date Value Ref Range Status   07/27/2021 13.6 12.0 - 15.9 g/dL Final   07/12/2018 12.3 11.9 - 15.5 g/dL Final     Hematocrit   Date Value Ref Range Status   07/27/2021 42.0 34.0 - 46.6 % Final   07/12/2018 36.2 35.6 - 45.5 % Final     MCV   Date Value Ref Range Status   07/27/2021 88.1 79.0 - 97.0 fL Final   07/12/2018 83.8 80.5 - 98.2 fL Final     MCH   Date Value Ref Range Status   07/27/2021 28.5 26.6 - 33.0 pg Final   07/12/2018 28.5 26.9 - 32.0 pg Final     MCHC   Date Value Ref Range Status   07/27/2021 32.4 31.5 - 35.7 g/dL Final   07/12/2018 34.0 32.4 - 36.3 g/dL Final     RDW   Date Value Ref Range Status   07/27/2021 13.2 12.3 - 15.4 % Final   07/12/2018 15.3 (H) 11.7 - 13.0 % Final     RDW-SD   Date Value Ref Range Status   07/12/2018 47.3 37.0 - 54.0 fl Final     MPV   Date Value Ref Range Status   07/12/2018 10.6 6.0 - 12.0 fL Final     Platelets   Date Value Ref Range Status   07/27/2021 370 140 - 450 10*3/mm3 Final   07/12/2018 371 140 - 500 10*3/mm3 Final     Neutrophil Rel %   Date Value Ref Range Status   07/27/2021 48.2 42.7 - 76.0 % Final     Neutrophil %   Date Value Ref Range Status   07/12/2018 47.4 42.7 - 76.0 % Final     Lymphocyte Rel %   Date Value Ref Range Status   07/27/2021 43.9 19.6 - 45.3 % Final     Lymphocyte %   Date Value Ref Range Status   07/12/2018 45.8 (H) 19.6 - 45.3 % Final     Monocyte Rel %   Date Value Ref Range Status   07/27/2021 4.9 (L) 5.0 - 12.0 % Final     Monocyte %   Date Value Ref Range Status   07/12/2018 4.4 (L) 5.0 - 12.0 % Final     Eosinophil Rel %   Date Value Ref Range Status   07/27/2021  1.7 0.3 - 6.2 % Final     Eosinophil %   Date Value Ref Range Status   07/12/2018 1.9 0.3 - 6.2 % Final     Basophil Rel %   Date Value Ref Range Status   07/27/2021 0.7 0.0 - 1.5 % Final     Basophil %   Date Value Ref Range Status   07/12/2018 0.5 0.0 - 1.5 % Final     Immature Grans %   Date Value Ref Range Status   07/12/2018 0.1 0.0 - 0.5 % Final     Neutrophils Absolute   Date Value Ref Range Status   07/27/2021 3.47 1.70 - 7.00 10*3/mm3 Final     Neutrophils, Absolute   Date Value Ref Range Status   07/12/2018 4.01 1.90 - 8.10 10*3/mm3 Final     Lymphocytes Absolute   Date Value Ref Range Status   07/27/2021 3.15 (H) 0.70 - 3.10 10*3/mm3 Final     Lymphocytes, Absolute   Date Value Ref Range Status   07/12/2018 3.87 0.90 - 4.80 10*3/mm3 Final     Monocytes Absolute   Date Value Ref Range Status   07/27/2021 0.35 0.10 - 0.90 10*3/mm3 Final     Monocytes, Absolute   Date Value Ref Range Status   07/12/2018 0.37 0.20 - 1.20 10*3/mm3 Final     Eosinophils Absolute   Date Value Ref Range Status   07/27/2021 0.12 0.00 - 0.40 10*3/mm3 Final     Eosinophils, Absolute   Date Value Ref Range Status   07/12/2018 0.16 0.00 - 0.70 10*3/mm3 Final     Basophils Absolute   Date Value Ref Range Status   07/27/2021 0.05 0.00 - 0.20 10*3/mm3 Final     Basophils, Absolute   Date Value Ref Range Status   07/12/2018 0.04 0.00 - 0.20 10*3/mm3 Final     Immature Grans, Absolute   Date Value Ref Range Status   07/12/2018 0.01 0.00 - 0.03 10*3/mm3 Final     nRBC   Date Value Ref Range Status   07/27/2021 0.0 0.0 - 0.2 /100 WBC Final       Lab Results   Component Value Date    HGBA1C 5.20 07/27/2021       Lab Results   Component Value Date    BIGJTRIF25 844 07/27/2021       TSH   Date Value Ref Range Status   12/19/2017 0.915 0.270 - 4.200 mIU/mL Final       Lab Results   Component Value Date    CHOL 150 12/19/2017     Lab Results   Component Value Date    TRIG 61 08/26/2020     Lab Results   Component Value Date    HDL 49 08/26/2020  Statement Selected     Lab Results   Component Value Date    LDL 79 08/26/2020     Lab Results   Component Value Date    VLDL 12 08/26/2020     Lab Results   Component Value Date    LDLHDL 2.63 12/19/2017         Procedures    Assessment/Plan   Problems Addressed this Visit     Anemia    Benign essential hypertension    Encounter for annual health examination - Primary    Encounter for hepatitis C screening test for low risk patient    Hyperglycemia      Diagnoses       Codes Comments    Encounter for annual health examination    -  Primary ICD-10-CM: Z00.00  ICD-9-CM: V70.0     Benign essential hypertension     ICD-10-CM: I10  ICD-9-CM: 401.1     Hyperglycemia     ICD-10-CM: R73.9  ICD-9-CM: 790.29     Iron deficiency anemia, unspecified iron deficiency anemia type     ICD-10-CM: D50.9  ICD-9-CM: 280.9     Encounter for hepatitis C screening test for low risk patient     ICD-10-CM: Z11.59  ICD-9-CM: V73.89       Anemia.  Check CBC.    Hyperglycemia.  Check A1c.    HTN.  Controlled.  Check CMP/FLP.   Preventive Counseling:  Encouraged to stay active.  Covid booster rec after 90 days form infusion.  C scope utd.   Mammo utd.    No orders of the defined types were placed in this encounter.      Current Outpatient Medications   Medication Sig Dispense Refill   • metoprolol succinate XL (TOPROL-XL) 50 MG 24 hr tablet TAKE 1 TABLET BY MOUTH DAILY. TAKES PLACE OF [facility] DUE TO INSURANCE SUBSTITUTION. 90 tablet 3   • multivitamin with minerals (MULTIVITAMIN ADULT PO) Take 2 tablets by mouth Daily.       No current facility-administered medications for this visit.       No follow-ups on file.    There are no Patient Instructions on file for this visit.

## 2022-09-07 ENCOUNTER — TELEPHONE (OUTPATIENT)
Dept: FAMILY MEDICINE CLINIC | Facility: CLINIC | Age: 47
End: 2022-09-07

## 2022-09-07 NOTE — TELEPHONE ENCOUNTER
Ok for Hub to read and schedule    Your provider will be out of the office on 9/8/2022 and your appointment will need to be rescheduled.

## 2022-09-28 ENCOUNTER — OFFICE VISIT (OUTPATIENT)
Dept: FAMILY MEDICINE CLINIC | Facility: CLINIC | Age: 47
End: 2022-09-28

## 2022-09-28 VITALS
WEIGHT: 247.4 LBS | SYSTOLIC BLOOD PRESSURE: 132 MMHG | OXYGEN SATURATION: 100 % | HEIGHT: 65 IN | DIASTOLIC BLOOD PRESSURE: 84 MMHG | TEMPERATURE: 98.2 F | BODY MASS INDEX: 41.22 KG/M2 | HEART RATE: 72 BPM

## 2022-09-28 DIAGNOSIS — Z28.39 IMMUNIZATION DEFICIENCY: ICD-10-CM

## 2022-09-28 DIAGNOSIS — S46.811A STRAIN OF RIGHT TRAPEZIUS MUSCLE, INITIAL ENCOUNTER: Primary | ICD-10-CM

## 2022-09-28 DIAGNOSIS — F51.01 PRIMARY INSOMNIA: ICD-10-CM

## 2022-09-28 DIAGNOSIS — I10 BENIGN ESSENTIAL HYPERTENSION: ICD-10-CM

## 2022-09-28 PROCEDURE — 99214 OFFICE O/P EST MOD 30 MIN: CPT | Performed by: FAMILY MEDICINE

## 2022-09-28 PROCEDURE — 90471 IMMUNIZATION ADMIN: CPT | Performed by: FAMILY MEDICINE

## 2022-09-28 PROCEDURE — 90686 IIV4 VACC NO PRSV 0.5 ML IM: CPT | Performed by: FAMILY MEDICINE

## 2022-09-28 PROCEDURE — 91312 COVID-19 (PFIZER) BIVALENT BOOSTER 12+YRS: CPT | Performed by: FAMILY MEDICINE

## 2022-09-28 PROCEDURE — 0124A COVID-19 (PFIZER) BIVALENT BOOSTER 12+YRS: CPT | Performed by: FAMILY MEDICINE

## 2022-09-28 RX ORDER — TRAZODONE HYDROCHLORIDE 50 MG/1
50 TABLET ORAL NIGHTLY
Qty: 30 TABLET | Refills: 5 | Status: SHIPPED | OUTPATIENT
Start: 2022-09-28

## 2022-09-28 RX ORDER — CYCLOBENZAPRINE HCL 5 MG
TABLET ORAL
Qty: 30 TABLET | Refills: 1 | Status: SHIPPED | OUTPATIENT
Start: 2022-09-28

## 2022-09-28 RX ORDER — MELOXICAM 15 MG/1
TABLET ORAL
Qty: 30 TABLET | Refills: 3 | Status: SHIPPED | OUTPATIENT
Start: 2022-09-28

## 2022-09-28 RX ORDER — METOPROLOL SUCCINATE 50 MG/1
50 TABLET, EXTENDED RELEASE ORAL DAILY
Qty: 90 TABLET | Refills: 3 | Status: SHIPPED | OUTPATIENT
Start: 2022-09-28 | End: 2022-11-29

## 2022-09-28 NOTE — PROGRESS NOTES
"Chief Complaint   Patient presents with   • Hypertension   • Pain     Back spasms        Wale Dockery is a 46 y.o. female.     History of Present Illness   F/U HTN. No orthostasis.  Doing well with meds.    C/o pain in thoracic area in back.  Increased pain with breathing.  Gets every 3-6 months.  Usually lasts a couple of days.  Worse with a certain movements.  \"I have to rock to get up\".  Going on since 2004 since moved a dresser.    C/o trouble with insomnia.  I tend to wake up through the night.    The following portions of the patient's history were reviewed and updated as appropriate: allergies, current medications, past family history, past medical history, past social history, past surgical history and problem list.    Review of Systems   Constitutional: Negative for appetite change and fatigue.   HENT: Negative for nosebleeds and sore throat.    Eyes: Negative for blurred vision and visual disturbance.   Respiratory: Negative for shortness of breath and wheezing.    Cardiovascular: Negative for chest pain and leg swelling.   Gastrointestinal: Negative for abdominal distention and abdominal pain.   Endocrine: Negative for cold intolerance and polyuria.   Genitourinary: Negative for dysuria and hematuria.   Musculoskeletal: Positive for back pain. Negative for arthralgias and myalgias.   Skin: Negative for color change and rash.   Neurological: Negative for weakness and confusion.   Psychiatric/Behavioral: Positive for sleep disturbance. Negative for agitation and depressed mood.       Patient Active Problem List   Diagnosis   • Chronic fatigue   • Edema   • TERRI (obstructive sleep apnea)   • Dietary counseling   • Obesity, Class II, BMI 35-39.9   • Allergic rhinitis   • Anemia   • Benign essential hypertension   • BPPV (benign paroxysmal positional vertigo)   • Hyperglycemia   • Wheezing   • Palpitations   • S/P laparoscopic sleeve gastrectomy   • Upper respiratory infection   • Encounter " for annual health examination   • Nasal congestion   • Close exposure to COVID-19 virus   • Primary osteoarthritis involving multiple joints   • Malabsorption due to intolerance, not elsewhere classified   • Encounter for hepatitis C screening test for low risk patient   • Strain of right trapezius muscle   • Primary insomnia   • Immunization deficiency       Allergies   Allergen Reactions   • Lisinopril-Hydrochlorothiazide Cough   • Penicillins Hives   • Skelaxin [Metaxalone] Hives   • Sulfa Antibiotics Hives         Current Outpatient Medications:   •  metoprolol succinate XL (TOPROL-XL) 50 MG 24 hr tablet, Take 1 tablet by mouth Daily. Takes place of [facility] due to insurance substitution., Disp: 90 tablet, Rfl: 3  •  multivitamin with minerals tablet tablet, Take 2 tablets by mouth Daily., Disp: , Rfl:   •  cyclobenzaprine (FLEXERIL) 5 MG tablet, One at night prn back pain, Disp: 30 tablet, Rfl: 1  •  meloxicam (MOBIC) 15 MG tablet, One a day prn back pain, Disp: 30 tablet, Rfl: 3  •  traZODone (DESYREL) 50 MG tablet, Take 1 tablet by mouth Every Night., Disp: 30 tablet, Rfl: 5    Past Medical History:   Diagnosis Date   • Abnormal uterine bleeding    • Acute foot pain, left    • Allergic rhinitis    • Anemia    • Ankle joint pain    • Bacterial vaginosis    • Benign essential hypertension    • BMI 50.0-59.9, adult (HCC)    • BPPV (benign paroxysmal positional vertigo)    • Daytime hypersomnolence    • Edema    • Hemorrhoids    • History of blood transfusion    • History of chickenpox    • Hx of bariatric surgery    • Hyperglycemia    • Hypocalcemia    • Irregular menstrual cycle    • Irritability and anger    • Macromastia    • Menorrhagia    • Obesity    • Obstructive sleep apnea syndrome    • Optic nerve swelling    • Palpitations    • Sleep difficulties    • Snoring    • Swelling    • Swelling of both ankles    • Trapezius muscle spasm    • Weight gain    • Wheezing        Past Surgical History:   Procedure  Laterality Date   • BUNIONECTOMY Left 11/30/2021    left foot   • GASTRIC SLEEVE LAPAROSCOPIC N/A 4/18/2018    Procedure: GASTRIC SLEEVE LAPAROSCOPIC;  Surgeon: Christ Sunshine Jr., MD;  Location: [facility];  Service: Bariatric   • TUBAL ABDOMINAL LIGATION     • VAGINAL HYSTERECTOMY         Family History   Problem Relation Age of Onset   • Obesity Mother    • Hypertension Mother    • Stroke Mother    • Coronary artery disease Mother    • Sleep apnea Mother    • Hypertension Sister    • Hypertension Maternal Grandfather    • Malig Hyperthermia Neg Hx        Social History     Tobacco Use   • Smoking status: Never Smoker   • Smokeless tobacco: Never Used   Substance Use Topics   • Alcohol use: No            Objective     Vitals:    09/28/22 1258   BP: 132/84   Pulse: 72   Temp: 98.2 °F (36.8 °C)   SpO2: 100%     Body mass index is 41.17 kg/m².    Physical Exam  Vitals reviewed.   Constitutional:       Appearance: She is well-developed. She is not diaphoretic.   HENT:      Head: Normocephalic and atraumatic.   Eyes:      General: No scleral icterus.     Pupils: Pupils are equal, round, and reactive to light.   Neck:      Thyroid: No thyromegaly.   Cardiovascular:      Rate and Rhythm: Normal rate and regular rhythm.      Heart sounds: No murmur heard.    No friction rub. No gallop.   Pulmonary:      Effort: Pulmonary effort is normal. No respiratory distress.      Breath sounds: No wheezing or rales.   Chest:      Chest wall: No tenderness.   Abdominal:      General: Bowel sounds are normal. There is no distension.      Palpations: Abdomen is soft.      Tenderness: There is no abdominal tenderness.   Musculoskeletal:         General: No deformity. Normal range of motion.      Comments: TTP R trap and rhomboid   Lymphadenopathy:      Cervical: No cervical adenopathy.   Skin:     General: Skin is warm and dry.      Findings: No rash.   Neurological:      Cranial Nerves: No cranial nerve deficit.      Motor: No  abnormal muscle tone.         Lab Results   Component Value Date    GLUCOSE 73 01/26/2022    BUN 6 01/26/2022    CREATININE 0.98 01/26/2022    EGFRIFNONA 68 01/26/2022    EGFRIFAFRI 79 01/26/2022    BCR 6 (L) 01/26/2022    K 4.3 01/26/2022    CO2 24 01/26/2022    CALCIUM 8.9 01/26/2022    PROTENTOTREF 6.3 01/26/2022    ALBUMIN 4.0 01/26/2022    LABIL2 1.7 01/26/2022    AST 14 01/26/2022    ALT 12 01/26/2022       WBC   Date Value Ref Range Status   01/26/2022 6.0 3.4 - 10.8 x10E3/uL Final     RBC   Date Value Ref Range Status   01/26/2022 4.51 3.77 - 5.28 x10E6/uL Final     Hemoglobin   Date Value Ref Range Status   01/26/2022 12.9 11.1 - 15.9 g/dL Final   07/12/2018 12.3 11.9 - 15.5 g/dL Final     Hematocrit   Date Value Ref Range Status   01/26/2022 38.8 34.0 - 46.6 % Final   07/12/2018 36.2 35.6 - 45.5 % Final     MCV   Date Value Ref Range Status   01/26/2022 86 79 - 97 fL Final   07/12/2018 83.8 80.5 - 98.2 fL Final     MCH   Date Value Ref Range Status   01/26/2022 28.6 26.6 - 33.0 pg Final   07/12/2018 28.5 26.9 - 32.0 pg Final     MCHC   Date Value Ref Range Status   01/26/2022 33.2 31.5 - 35.7 g/dL Final   07/12/2018 34.0 32.4 - 36.3 g/dL Final     RDW   Date Value Ref Range Status   01/26/2022 13.8 11.7 - 15.4 % Final   07/12/2018 15.3 (H) 11.7 - 13.0 % Final     RDW-SD   Date Value Ref Range Status   07/12/2018 47.3 37.0 - 54.0 fl Final     MPV   Date Value Ref Range Status   07/12/2018 10.6 6.0 - 12.0 fL Final     Platelets   Date Value Ref Range Status   01/26/2022 329 150 - 450 x10E3/uL Final   07/12/2018 371 140 - 500 10*3/mm3 Final     Neutrophil Rel %   Date Value Ref Range Status   01/26/2022 52 Not Estab. % Final     Neutrophil %   Date Value Ref Range Status   07/12/2018 47.4 42.7 - 76.0 % Final     Lymphocyte Rel %   Date Value Ref Range Status   01/26/2022 39 Not Estab. % Final     Lymphocyte %   Date Value Ref Range Status   07/12/2018 45.8 (H) 19.6 - 45.3 % Final     Monocyte Rel %   Date  Value Ref Range Status   01/26/2022 6 Not Estab. % Final     Monocyte %   Date Value Ref Range Status   07/12/2018 4.4 (L) 5.0 - 12.0 % Final     Eosinophil Rel %   Date Value Ref Range Status   01/26/2022 2 Not Estab. % Final     Eosinophil %   Date Value Ref Range Status   07/12/2018 1.9 0.3 - 6.2 % Final     Basophil Rel %   Date Value Ref Range Status   01/26/2022 1 Not Estab. % Final     Basophil %   Date Value Ref Range Status   07/12/2018 0.5 0.0 - 1.5 % Final     Immature Grans %   Date Value Ref Range Status   07/12/2018 0.1 0.0 - 0.5 % Final     Neutrophils Absolute   Date Value Ref Range Status   01/26/2022 3.1 1.4 - 7.0 x10E3/uL Final     Neutrophils, Absolute   Date Value Ref Range Status   07/12/2018 4.01 1.90 - 8.10 10*3/mm3 Final     Lymphocytes Absolute   Date Value Ref Range Status   01/26/2022 2.3 0.7 - 3.1 x10E3/uL Final     Lymphocytes, Absolute   Date Value Ref Range Status   07/12/2018 3.87 0.90 - 4.80 10*3/mm3 Final     Monocytes Absolute   Date Value Ref Range Status   01/26/2022 0.4 0.1 - 0.9 x10E3/uL Final     Monocytes, Absolute   Date Value Ref Range Status   07/12/2018 0.37 0.20 - 1.20 10*3/mm3 Final     Eosinophils Absolute   Date Value Ref Range Status   01/26/2022 0.1 0.0 - 0.4 x10E3/uL Final     Eosinophils, Absolute   Date Value Ref Range Status   07/12/2018 0.16 0.00 - 0.70 10*3/mm3 Final     Basophils Absolute   Date Value Ref Range Status   01/26/2022 0.1 0.0 - 0.2 x10E3/uL Final     Basophils, Absolute   Date Value Ref Range Status   07/12/2018 0.04 0.00 - 0.20 10*3/mm3 Final     Immature Grans, Absolute   Date Value Ref Range Status   07/12/2018 0.01 0.00 - 0.03 10*3/mm3 Final     nRBC   Date Value Ref Range Status   07/27/2021 0.0 0.0 - 0.2 /100 WBC Final       Lab Results   Component Value Date    HGBA1C 5.4 01/26/2022       Lab Results   Component Value Date    GOIIAUOE80 844 07/27/2021       TSH   Date Value Ref Range Status   12/19/2017 0.915 0.270 - 4.200 mIU/mL Final        Lab Results   Component Value Date    CHOL 150 12/19/2017     Lab Results   Component Value Date    TRIG 73 01/26/2022     Lab Results   Component Value Date    HDL 54 01/26/2022     Lab Results   Component Value Date    LDL 76 01/26/2022     Lab Results   Component Value Date    VLDL 14 01/26/2022     Lab Results   Component Value Date    LDLHDL 2.63 12/19/2017         Procedures    Assessment & Plan   Problems Addressed this Visit     Benign essential hypertension    Relevant Medications    metoprolol succinate XL (TOPROL-XL) 50 MG 24 hr tablet    Immunization deficiency    Relevant Orders    COVID-19 Bivalent Booster (Pfizer) 12+yrs    FluLaval/Fluarix/Fluzone >6 Months    Primary insomnia    Relevant Medications    traZODone (DESYREL) 50 MG tablet    Strain of right trapezius muscle - Primary    Relevant Medications    meloxicam (MOBIC) 15 MG tablet    cyclobenzaprine (FLEXERIL) 5 MG tablet      Diagnoses       Codes Comments    Strain of right trapezius muscle, initial encounter    -  Primary ICD-10-CM: S46.811A  ICD-9-CM: 840.8     Benign essential hypertension     ICD-10-CM: I10  ICD-9-CM: 401.1     Primary insomnia     ICD-10-CM: F51.01  ICD-9-CM: 307.42     Immunization deficiency     ICD-10-CM: Z28.39  ICD-9-CM: V15.83         Chronic insomnia.  Uncontrolled.  Start trazodone 50 at night.    R trap strain, chronic,  Uncontrolled.  Cyclobenzaprine 5 at night, meloxicam 15 a day with food prn pain.    HTn.  Controlled.  RF metoprolol.   Orders Placed This Encounter   Procedures   • COVID-19 Bivalent Booster (Pfizer) 12+yrs   • FluLaval/Fluarix/Fluzone >6 Months       Current Outpatient Medications   Medication Sig Dispense Refill   • metoprolol succinate XL (TOPROL-XL) 50 MG 24 hr tablet Take 1 tablet by mouth Daily. Takes place of [facility] due to insurance substitution. 90 tablet 3   • multivitamin with minerals tablet tablet Take 2 tablets by mouth Daily.     • cyclobenzaprine (FLEXERIL) 5 MG tablet  One at night prn back pain 30 tablet 1   • meloxicam (MOBIC) 15 MG tablet One a day prn back pain 30 tablet 3   • traZODone (DESYREL) 50 MG tablet Take 1 tablet by mouth Every Night. 30 tablet 5     No current facility-administered medications for this visit.       Shruthi Dockery had no medications administered during this visit.    No follow-ups on file.    There are no Patient Instructions on file for this visit.

## 2022-10-06 ENCOUNTER — OFFICE VISIT (OUTPATIENT)
Dept: BARIATRICS/WEIGHT MGMT | Facility: CLINIC | Age: 47
End: 2022-10-06

## 2022-10-06 VITALS
DIASTOLIC BLOOD PRESSURE: 96 MMHG | HEART RATE: 74 BPM | TEMPERATURE: 97.8 F | RESPIRATION RATE: 18 BRPM | SYSTOLIC BLOOD PRESSURE: 180 MMHG | HEIGHT: 65 IN | WEIGHT: 249 LBS | BODY MASS INDEX: 41.48 KG/M2

## 2022-10-06 DIAGNOSIS — Z98.84 S/P LAPAROSCOPIC SLEEVE GASTRECTOMY: ICD-10-CM

## 2022-10-06 DIAGNOSIS — E66.01 OBESITY, CLASS III, BMI 40-49.9 (MORBID OBESITY): Primary | ICD-10-CM

## 2022-10-06 DIAGNOSIS — G47.33 OSA (OBSTRUCTIVE SLEEP APNEA): ICD-10-CM

## 2022-10-06 DIAGNOSIS — I10 BENIGN ESSENTIAL HYPERTENSION: ICD-10-CM

## 2022-10-06 DIAGNOSIS — S46.811A STRAIN OF RIGHT TRAPEZIUS MUSCLE, INITIAL ENCOUNTER: ICD-10-CM

## 2022-10-06 DIAGNOSIS — K90.49 MALABSORPTION DUE TO INTOLERANCE, NOT ELSEWHERE CLASSIFIED: ICD-10-CM

## 2022-10-06 DIAGNOSIS — R53.82 CHRONIC FATIGUE: ICD-10-CM

## 2022-10-06 PROBLEM — Z11.59 ENCOUNTER FOR HEPATITIS C SCREENING TEST FOR LOW RISK PATIENT: Status: RESOLVED | Noted: 2022-01-26 | Resolved: 2022-10-06

## 2022-10-06 PROBLEM — Z71.3 DIETARY COUNSELING: Status: RESOLVED | Noted: 2018-05-23 | Resolved: 2022-10-06

## 2022-10-06 PROBLEM — R09.81 NASAL CONGESTION: Status: RESOLVED | Noted: 2021-02-03 | Resolved: 2022-10-06

## 2022-10-06 PROBLEM — Z28.39 IMMUNIZATION DEFICIENCY: Status: RESOLVED | Noted: 2022-09-28 | Resolved: 2022-10-06

## 2022-10-06 PROBLEM — E66.812 OBESITY, CLASS II, BMI 35-39.9: Status: RESOLVED | Noted: 2018-10-17 | Resolved: 2022-10-06

## 2022-10-06 PROBLEM — J06.9 UPPER RESPIRATORY INFECTION: Status: RESOLVED | Noted: 2020-03-10 | Resolved: 2022-10-06

## 2022-10-06 PROBLEM — Z00.00 ENCOUNTER FOR ANNUAL HEALTH EXAMINATION: Status: RESOLVED | Noted: 2020-08-26 | Resolved: 2022-10-06

## 2022-10-06 PROBLEM — Z20.822 CLOSE EXPOSURE TO COVID-19 VIRUS: Status: RESOLVED | Noted: 2021-02-03 | Resolved: 2022-10-06

## 2022-10-06 PROBLEM — E66.9 OBESITY, CLASS II, BMI 35-39.9: Status: RESOLVED | Noted: 2018-10-17 | Resolved: 2022-10-06

## 2022-10-06 PROCEDURE — 99213 OFFICE O/P EST LOW 20 MIN: CPT | Performed by: NURSE PRACTITIONER

## 2022-10-06 NOTE — PROGRESS NOTES
[facility]  [address] [facility] [address]  [facility] [id number] [phone]  [address] [facility] [address]  [facility] [id number] [phone]  Dept: [phone]  10/6/2022      Shruthi Dockery.  [id number]  [id number]  1975  female      Chief Complaint   Patient presents with   • Follow-up     Sleeve follow up (last seen 2020)       BH Post-Op Bariatric Surgery:   Shruthi Dockery is status post Laparoscopic Sleeve procedure, performed on 4/18/2018. She reports that she was getting to the gym regularly and had a small group for support there, and then had a foot fracture and has needed a couple of surgeries. She has just started to be able to walk a bit again, during her covid her gym closed.     HPI:   Today's weight is 113 kg (249 lb) pounds, today's BMI is Body mass index is 41.44 kg/m².,@ has a  gain of 27 pounds since the last visit and@ weight loss since surgery is 55 pounds. The patient reports a decreased portion size and loss of appetite.      Shruthi Dockery denies nausea, vomiting, reflux and reports feeling frustrated with her weight regain.      Diet and Exercise: Diet history reviewed and discussed with the patient. Weight loss/gains to date discussed with the patient. The patient states they are eating 50-60 grams of protein per day. She reports eating 1-2 meals per day, a typical portion size of 1/3 cup, eating 3-5 snacks per day, drinking 4-6 or more 8-oz. glasses of water per day, no carbonated beverage consumption. Denies regular exercise due to foot pain.    She tried going back to the shakes but feels hungry often. She reports that she still eats a small portion but feels that she is hungry more often.     Supplements: OTC MTV with iron.     Review of Systems   Constitutional: Positive for appetite change. Negative for fatigue and unexpected weight change.   HENT: Negative.    Eyes: Negative.    Respiratory: Negative.     Cardiovascular: Negative.  Negative for leg swelling.   Gastrointestinal: Negative for abdominal distention, abdominal pain, constipation, diarrhea, nausea and vomiting.   Genitourinary: Negative for difficulty urinating, frequency and urgency.   Musculoskeletal: Negative for back pain.   Skin: Negative.    Psychiatric/Behavioral: Negative.    All other systems reviewed and are negative.      Patient Active Problem List   Diagnosis   • Chronic fatigue   • Edema   • TERRI (obstructive sleep apnea)   • Obesity, Class III, BMI 40-49.9 (morbid obesity) ([facility])   • Dietary counseling   • Allergic rhinitis   • Anemia   • Benign essential hypertension   • BPPV (benign paroxysmal positional vertigo)   • Hyperglycemia   • Wheezing   • Palpitations   • S/P laparoscopic sleeve gastrectomy   • Primary osteoarthritis involving multiple joints   • Malabsorption due to intolerance, not elsewhere classified   • Primary insomnia       Past Medical History:   Diagnosis Date   • Abnormal uterine bleeding    • Acute foot pain, left    • Allergic rhinitis    • Anemia    • Ankle joint pain    • Bacterial vaginosis    • Benign essential hypertension    • BMI 50.0-59.9, adult ([facility])    • BPPV (benign paroxysmal positional vertigo)    • Daytime hypersomnolence    • Edema    • Hemorrhoids    • History of blood transfusion    • History of chickenpox    • Hx of bariatric surgery    • Hyperglycemia    • Hypocalcemia    • Immunization deficiency 9/28/2022   • Irregular menstrual cycle    • Irritability and anger    • Macromastia    • Menorrhagia    • Obesity    • Obstructive sleep apnea syndrome    • Optic nerve swelling    • Palpitations    • Sleep difficulties    • Snoring    • Strain of right trapezius muscle 9/28/2022   • Swelling    • Swelling of both ankles    • Trapezius muscle spasm    • Weight gain    • Wheezing        The following portions of the patient's history were reviewed and updated as appropriate: allergies, current medications,  past family history, past medical history, past social history, past surgical history and problem list.    Vitals:    10/06/22 0728   BP: 180/96   Pulse: 74   Resp: 18   Temp: 97.8 °F (36.6 °C)       Physical Exam  Vitals and nursing note reviewed.   Constitutional:       Appearance: She is well-developed. She is not diaphoretic.   Pulmonary:      Effort: Pulmonary effort is normal.   Neurological:      Mental Status: She is alert and oriented to person, place, and time.   Psychiatric:         Behavior: Behavior normal.         Assessment:   Post-op, the patient has gotten off track with her diet and has been limited on exercise due to foot pain and surgeries.     Encounter Diagnoses   Name Primary?   • Obesity, Class III, BMI 40-49.9 (morbid obesity) (HCC) Yes   • S/P laparoscopic sleeve gastrectomy    • Benign essential hypertension    • TERRI (obstructive sleep apnea)    • Chronic fatigue    • Malabsorption due to intolerance, not elsewhere classified    • Strain of right trapezius muscle, initial encounter        Plan:   Patient was advised to focus on three solid meals daily with a snack if needed but I did emphasize the importance of breaks between meals and avoiding grazing to allow her blood sugar to return to a fasting state between meals. She will add a fiber supplement to her routine daily, trial new group or gym memberships for exercise. We did discuss starting a GLP-1 medication in the future after she follows up with the dietitian.   Encouraged patient to be sure to get plenty of lean protein per day through small frequent meals all with a protein source.   Activity restrictions: none.   Recommended patient be sure to get at least 70 grams of protein per day by eating small, frequent meals all with high lean protein choices. Be sure to limit/cut back on daily carbohydrate intake. Discussed with the patient the recommended amount of water per day to intake- half of body weight in ounces. Reviewed vitamin  requirements. Be sure to do routine exercise, 150 minutes per week minimum, including both cardio and strength training.     Instructions / Recommendations: dietary counseling recommended, recommended a daily protein intake of  grams, vitamin supplement(s) recommended, recommended exercising at least 150 minutes per week, behavior modifications recommended and instructed to call the office for concerns, questions, or problems.     The patient was instructed to follow up in 3 months .     Total time spent during this encounter today was 25 minutes

## 2022-11-29 ENCOUNTER — TELEPHONE (OUTPATIENT)
Dept: FAMILY MEDICINE CLINIC | Facility: CLINIC | Age: 47
End: 2022-11-29

## 2022-11-29 RX ORDER — OLMESARTAN MEDOXOMIL 20 MG/1
20 TABLET ORAL DAILY
Qty: 90 TABLET | Refills: 1 | Status: SHIPPED | OUTPATIENT
Start: 2022-11-29 | End: 2023-03-28 | Stop reason: SDUPTHER

## 2022-11-29 NOTE — TELEPHONE ENCOUNTER
Caller: Sarkis, Shruthi HEWITT    Relationship: Self    Best call back number: [phone] (Mobile)    What is the best time to reach you: ANYTIME    Who are you requesting to speak with (clinical staff, provider,  specific staff member): CLINICAL STAFF    Do you know the name of the person who called:      What was the call regarding:  PATIENT CALLED TO REQUEST A CALLBACK TO DISCUSS GETTING OFF THE metoprolol succinate XL (TOPROL-XL) 50 MG 24 hr tablet DUE TO THE WAY IT IS CAUSING HER TO FEEL-HEART RATE SLOWING DOWN AND THEN SPEEDING UP. PATIENT WOULD LIKE TO DISCUSS GETTING ON BENITAR.    Do you require a callback: YES          THANKS

## 2022-11-29 NOTE — TELEPHONE ENCOUNTER
Ok for hub and  can read message    Stop metoprolol by decreasing to 1/2 tablet a day for 1 week then stop.  Start benicar 20 mg a day now.  Can take both for one week.  I sent to her pharmacy.

## 2022-12-08 ENCOUNTER — OFFICE VISIT (OUTPATIENT)
Dept: BARIATRICS/WEIGHT MGMT | Facility: CLINIC | Age: 47
End: 2022-12-08

## 2022-12-08 VITALS
HEART RATE: 82 BPM | HEIGHT: 65 IN | TEMPERATURE: 97.8 F | DIASTOLIC BLOOD PRESSURE: 94 MMHG | WEIGHT: 250 LBS | SYSTOLIC BLOOD PRESSURE: 160 MMHG | BODY MASS INDEX: 41.65 KG/M2

## 2022-12-08 DIAGNOSIS — Z98.84 S/P LAPAROSCOPIC SLEEVE GASTRECTOMY: ICD-10-CM

## 2022-12-08 DIAGNOSIS — I10 BENIGN ESSENTIAL HYPERTENSION: ICD-10-CM

## 2022-12-08 DIAGNOSIS — E66.01 OBESITY, CLASS III, BMI 40-49.9 (MORBID OBESITY): Primary | ICD-10-CM

## 2022-12-08 DIAGNOSIS — G47.33 OSA (OBSTRUCTIVE SLEEP APNEA): ICD-10-CM

## 2022-12-08 DIAGNOSIS — R53.82 CHRONIC FATIGUE: ICD-10-CM

## 2022-12-08 PROCEDURE — 99213 OFFICE O/P EST LOW 20 MIN: CPT | Performed by: NURSE PRACTITIONER

## 2022-12-08 NOTE — PROGRESS NOTES
[facility] BARIATRIC SURGERY  [address] [facility] [address]  [facility] [id number] [phone]  [address] [facility] [address]  [facility] [id number] [phone]  Dept: [phone]  12/8/2022      Shruthi Dockery.  [id number]  [id number]  1975  female      Chief Complaint   Patient presents with   • Follow-up     Follow up       BH Post-Op Bariatric Surgery:   Shruthi HEWITT Overall is status post Laparoscopic Sleeve procedure, performed on 4/18/18     HPI:   Today's weight is 113 kg (250 lb) pounds, today's BMI is Body mass index is 41.6 kg/m².,@ has a gain of 1 pounds since the last visit and@ weight loss since surgery is 50 pounds. The patient reports a decreased portion size and loss of appetite.      Shruthi Dockery denies nausea, vomiting, reflux     Diet and Exercise: Diet history reviewed and discussed with the patient. Weight loss/gains to date discussed with the patient. The patient states they are eating 60 grams of protein per day. She reports eating 3 meals per day, a typical portion size of 1/2 cup, eating 0-1 snacks per day, drinking 5-6 or more 8-oz. glasses of water per day. She was in an exercise program that is more intimate that closed. She occasionally had a diet dr reyna, maybe has one twice per week but only gets a couple of sips.     She gets up and has breakfast at home in the mornings, eggs avocado and toast, will take lunch to work with chicken salad and avocado. She usually has a snack before leaving work will take fruit and a hard boiled egg. Last night had a salmon cake and smothered potatoes and onion with broccoli.     Supplements: celebrate [employer] with iron and calcium    Review of Systems   Constitutional: Positive for appetite change. Negative for fatigue and unexpected weight change.   HENT: Negative.    Eyes: Negative.    Respiratory: Negative.    Cardiovascular: Negative.  Negative for leg swelling.   Gastrointestinal: Negative for abdominal  distention, abdominal pain, constipation, diarrhea, nausea and vomiting.   Genitourinary: Negative for difficulty urinating, frequency and urgency.   Musculoskeletal: Negative for back pain.   Skin: Negative.    Psychiatric/Behavioral: Negative.    All other systems reviewed and are negative.      Patient Active Problem List   Diagnosis   • Chronic fatigue   • Edema   • TERRI (obstructive sleep apnea)   • Obesity, Class III, BMI 40-49.9 (morbid obesity) ([facility])   • Allergic rhinitis   • Anemia   • Benign essential hypertension   • BPPV (benign paroxysmal positional vertigo)   • Hyperglycemia   • Wheezing   • Palpitations   • S/P laparoscopic sleeve gastrectomy   • Primary osteoarthritis involving multiple joints   • Malabsorption due to intolerance, not elsewhere classified   • Primary insomnia       Past Medical History:   Diagnosis Date   • Abnormal uterine bleeding    • Acute foot pain, left    • Allergic rhinitis    • Anemia    • Ankle joint pain    • Bacterial vaginosis    • Benign essential hypertension    • BMI 50.0-59.9, adult ([facility])    • BPPV (benign paroxysmal positional vertigo)    • Daytime hypersomnolence    • Edema    • Hemorrhoids    • History of blood transfusion    • History of chickenpox    • Hx of bariatric surgery    • Hyperglycemia    • Hypocalcemia    • Immunization deficiency 9/28/2022   • Irregular menstrual cycle    • Irritability and anger    • Macromastia    • Menorrhagia    • Obesity    • Obstructive sleep apnea syndrome    • Optic nerve swelling    • Palpitations    • Sleep difficulties    • Snoring    • Strain of right trapezius muscle 9/28/2022   • Swelling    • Swelling of both ankles    • Trapezius muscle spasm    • Weight gain    • Wheezing        The following portions of the patient's history were reviewed and updated as appropriate: allergies, current medications, past family history, past medical history, past social history, past surgical history and problem list.    Vitals:     12/08/22 0714   BP: 160/94   Pulse: 82   Temp: 97.8 °F (36.6 °C)       Physical Exam  Vitals and nursing note reviewed.   Constitutional:       Appearance: She is well-developed.   Neck:      Thyroid: No thyromegaly.   Cardiovascular:      Rate and Rhythm: Normal rate.   Pulmonary:      Effort: Pulmonary effort is normal. No respiratory distress.   Abdominal:      Palpations: Abdomen is soft.   Musculoskeletal:         General: No tenderness.   Skin:     General: Skin is warm and dry.   Neurological:      Mental Status: She is alert.   Psychiatric:         Behavior: Behavior normal.         Assessment:   Post-op, the patient is doing well.     Encounter Diagnoses   Name Primary?   • Obesity, Class III, BMI 40-49.9 (morbid obesity) (HCC) Yes   • Benign essential hypertension    • S/P laparoscopic sleeve gastrectomy    • TERRI (obstructive sleep apnea)    • Chronic fatigue        Plan:   Encouraged patient to be sure to get plenty of lean protein per day through small frequent meals all with a protein source.   Activity restrictions: none.   Recommended patient be sure to get at least 70 grams of protein per day by eating small, frequent meals all with high lean protein choices. Be sure to limit/cut back on daily carbohydrate intake. Discussed with the patient the recommended amount of water per day to intake- half of body weight in ounces. Reviewed vitamin requirements. Be sure to do routine exercise, 150 minutes per week minimum, including both cardio and strength training.     Instructions / Recommendations: dietary counseling recommended, recommended a daily protein intake of  grams, vitamin supplement(s) recommended, recommended exercising at least 150 minutes per week, behavior modifications recommended and instructed to call the office for concerns, questions, or problems.     The patient was instructed to follow up in 3 month .     . Total time spent during this encounter today was 25 minutes

## 2023-01-05 ENCOUNTER — OFFICE VISIT (OUTPATIENT)
Dept: BARIATRICS/WEIGHT MGMT | Facility: CLINIC | Age: 48
End: 2023-01-05
Payer: COMMERCIAL

## 2023-01-05 VITALS
BODY MASS INDEX: 40.65 KG/M2 | HEIGHT: 65 IN | WEIGHT: 244 LBS | DIASTOLIC BLOOD PRESSURE: 86 MMHG | TEMPERATURE: 97.1 F | HEART RATE: 76 BPM | SYSTOLIC BLOOD PRESSURE: 142 MMHG

## 2023-01-05 DIAGNOSIS — Z98.84 S/P LAPAROSCOPIC SLEEVE GASTRECTOMY: ICD-10-CM

## 2023-01-05 DIAGNOSIS — R53.82 CHRONIC FATIGUE: ICD-10-CM

## 2023-01-05 DIAGNOSIS — I10 BENIGN ESSENTIAL HYPERTENSION: ICD-10-CM

## 2023-01-05 DIAGNOSIS — G47.33 OSA (OBSTRUCTIVE SLEEP APNEA): ICD-10-CM

## 2023-01-05 DIAGNOSIS — E66.01 OBESITY, CLASS III, BMI 40-49.9 (MORBID OBESITY): Primary | ICD-10-CM

## 2023-01-05 PROCEDURE — 99213 OFFICE O/P EST LOW 20 MIN: CPT | Performed by: NURSE PRACTITIONER

## 2023-01-05 NOTE — PROGRESS NOTES
[facility] BARIATRIC SURGERY  [address] [facility] [address]  [facility] [id number] [phone]  [address] [facility] [address]  [facility] [id number] [phone]  Dept: [phone]  1/5/2023      Shruthi Dockery.  [id number]  [id number]  1975  female      Chief Complaint   Patient presents with   • Follow-up     Fup sleeve       BH Post-Op Bariatric Surgery:   Shruthi Dockery is status post Laparoscopic Sleeve procedure, performed on 4/18/18 who has been on Saxenda for the last month. She has been taking the 2.4mg dose this last week and is tolerating it well.     HPI:   Today's weight is 111 kg (244 lb) pounds, today's BMI is Body mass index is 40.6 kg/m².,@ has a  loss of 6 pounds since the last visit and@ weight loss since surgery is 56 pounds. The patient reports a decreased portion size and loss of appetite.      Shruthi Dockery denies nausea, reflux, heartburn and reports that she has started using a supplemental protein shake again to help get her protein. She has been following an intermittent fasting plan. Eating between 8am-8pm.      Diet and Exercise: Diet history reviewed and discussed with the patient. Weight loss/gains to date discussed with the patient. The patient states they are eating 60 grams of protein per day. She reports eating 3 meals per day (one shake and two solid meals), a typical portion size of 1/3 cup, eating 0-1 snacks per day, drinking 5-6 or more 8-oz. glasses of water per day, no carbonated beverage consumption and exercising regularly.     Supplements: celebrate [employer] with iron and calcium.     Review of Systems   Constitutional: Positive for appetite change. Negative for fatigue and unexpected weight change.   HENT: Negative.    Eyes: Negative.    Respiratory: Negative.    Cardiovascular: Negative.  Negative for leg swelling.   Gastrointestinal: Negative for abdominal distention, abdominal pain, constipation, diarrhea, nausea and  vomiting.   Genitourinary: Negative for difficulty urinating, frequency and urgency.   Musculoskeletal: Negative for back pain.   Skin: Negative.    Psychiatric/Behavioral: Negative.    All other systems reviewed and are negative.      Patient Active Problem List   Diagnosis   • Chronic fatigue   • Edema   • TERRI (obstructive sleep apnea)   • Obesity, Class III, BMI 40-49.9 (morbid obesity) ([facility])   • Allergic rhinitis   • Anemia   • Benign essential hypertension   • BPPV (benign paroxysmal positional vertigo)   • Hyperglycemia   • Wheezing   • Palpitations   • S/P laparoscopic sleeve gastrectomy   • Primary osteoarthritis involving multiple joints   • Malabsorption due to intolerance, not elsewhere classified   • Primary insomnia       Past Medical History:   Diagnosis Date   • Abnormal uterine bleeding    • Acute foot pain, left    • Allergic rhinitis    • Anemia    • Ankle joint pain    • Bacterial vaginosis    • Benign essential hypertension    • BMI 50.0-59.9, adult ([facility])    • BPPV (benign paroxysmal positional vertigo)    • Daytime hypersomnolence    • Edema    • Hemorrhoids    • History of blood transfusion    • History of chickenpox    • Hx of bariatric surgery    • Hyperglycemia    • Hypocalcemia    • Immunization deficiency 9/28/2022   • Irregular menstrual cycle    • Irritability and anger    • Macromastia    • Menorrhagia    • Obesity    • Obstructive sleep apnea syndrome    • Optic nerve swelling    • Palpitations    • Sleep difficulties    • Snoring    • Strain of right trapezius muscle 9/28/2022   • Swelling    • Swelling of both ankles    • Trapezius muscle spasm    • Weight gain    • Wheezing        The following portions of the patient's history were reviewed and updated as appropriate: allergies, current medications, past family history, past medical history, past social history, past surgical history and problem list.    Vitals:    01/05/23 0838   BP: 142/86   Pulse: 76   Temp: 97.1 °F (36.2 °C)        Physical Exam  Vitals and nursing note reviewed.   Constitutional:       Appearance: She is well-developed.   Neck:      Thyroid: No thyromegaly.   Cardiovascular:      Rate and Rhythm: Normal rate.   Pulmonary:      Effort: Pulmonary effort is normal. No respiratory distress.   Abdominal:      Palpations: Abdomen is soft.   Musculoskeletal:         General: No tenderness.   Skin:     General: Skin is warm and dry.   Neurological:      Mental Status: She is alert.   Psychiatric:         Behavior: Behavior normal.         Assessment:   Post-op, the patient is doing well.     Encounter Diagnoses   Name Primary?   • Obesity, Class III, BMI 40-49.9 (morbid obesity) (HCC) Yes   • S/P laparoscopic sleeve gastrectomy    • Benign essential hypertension    • TERRI (obstructive sleep apnea)    • Chronic fatigue        Plan:   She is planning to increase her dose of saxenda to 3mg daily starting Friday.  Encouraged patient to be sure to get plenty of lean protein per day through small frequent meals all with a protein source.   Activity restrictions: none.   Recommended patient be sure to get at least 70 grams of protein per day by eating small, frequent meals all with high lean protein choices. Be sure to limit/cut back on daily carbohydrate intake. Discussed with the patient the recommended amount of water per day to intake- half of body weight in ounces. Reviewed vitamin requirements. Be sure to do routine exercise, 150 minutes per week minimum, including both cardio and strength training.     Instructions / Recommendations: dietary counseling recommended, recommended a daily protein intake of  grams, vitamin supplement(s) recommended, recommended exercising at least 150 minutes per week, behavior modifications recommended and instructed to call the office for concerns, questions, or problems.     The patient was instructed to follow up in 3 months .      Total time spent during this encounter today was 25  minutes

## 2023-02-22 RX ORDER — LIRAGLUTIDE 6 MG/ML
INJECTION, SOLUTION SUBCUTANEOUS
Qty: 15 ML | Refills: 0 | Status: SHIPPED | OUTPATIENT
Start: 2023-02-22

## 2023-03-20 RX ORDER — PEN NEEDLE, DIABETIC 32 GX 1/4"
NEEDLE, DISPOSABLE MISCELLANEOUS
Qty: 100 EACH | Refills: 0 | Status: SHIPPED | OUTPATIENT
Start: 2023-03-20

## 2023-03-28 ENCOUNTER — OFFICE VISIT (OUTPATIENT)
Dept: FAMILY MEDICINE CLINIC | Facility: CLINIC | Age: 48
End: 2023-03-28
Payer: COMMERCIAL

## 2023-03-28 VITALS
HEART RATE: 81 BPM | HEIGHT: 65 IN | TEMPERATURE: 98.6 F | DIASTOLIC BLOOD PRESSURE: 80 MMHG | WEIGHT: 237.2 LBS | OXYGEN SATURATION: 100 % | SYSTOLIC BLOOD PRESSURE: 136 MMHG | BODY MASS INDEX: 39.52 KG/M2

## 2023-03-28 DIAGNOSIS — S16.1XXA STRAIN OF NECK MUSCLE, INITIAL ENCOUNTER: ICD-10-CM

## 2023-03-28 DIAGNOSIS — I10 BENIGN ESSENTIAL HYPERTENSION: ICD-10-CM

## 2023-03-28 DIAGNOSIS — K90.49 MALABSORPTION DUE TO INTOLERANCE, NOT ELSEWHERE CLASSIFIED: ICD-10-CM

## 2023-03-28 DIAGNOSIS — Z00.00 ENCOUNTER FOR ANNUAL PHYSICAL EXAM: Primary | ICD-10-CM

## 2023-03-28 DIAGNOSIS — R73.9 HYPERGLYCEMIA: ICD-10-CM

## 2023-03-28 DIAGNOSIS — N62 MACROMASTIA: ICD-10-CM

## 2023-03-28 RX ORDER — OLMESARTAN MEDOXOMIL 20 MG/1
20 TABLET ORAL DAILY
Qty: 90 TABLET | Refills: 1 | Status: SHIPPED | OUTPATIENT
Start: 2023-03-28

## 2023-03-28 NOTE — PROGRESS NOTES
F/u Annual.    Subjective   Shruthi Dockery is a 47 y.o. female.     History of Present Illness   47 year old AA female.    Dentist: KEYLA.  Optho: johnna    C scope utd.        C/o  B uper back and shoulder pain.  Going on years  In an H cup and says her breasts are hurting her.   No better.    The following portions of the patient's history were reviewed and updated as appropriate: allergies, current medications, past family history, past medical history, past social history, past surgical history and problem list.    Review of Systems   Constitutional: Negative for appetite change and fatigue.   HENT: Negative for nosebleeds and sore throat.    Eyes: Negative for blurred vision and visual disturbance.   Respiratory: Negative for shortness of breath and wheezing.    Cardiovascular: Negative for chest pain and leg swelling.   Gastrointestinal: Negative for abdominal distention and abdominal pain.   Endocrine: Negative for cold intolerance and polyuria.   Genitourinary: Negative for dysuria and hematuria.   Musculoskeletal: Positive for neck pain. Negative for arthralgias and myalgias.   Skin: Negative for color change and rash.   Neurological: Negative for weakness and confusion.   Psychiatric/Behavioral: Negative for agitation and depressed mood.       Patient Active Problem List   Diagnosis   • Chronic fatigue   • Edema   • TERRI (obstructive sleep apnea)   • Obesity, Class III, BMI 40-49.9 (morbid obesity) ([facility])   • Allergic rhinitis   • Anemia   • Benign essential hypertension   • BPPV (benign paroxysmal positional vertigo)   • Hyperglycemia   • Wheezing   • Palpitations   • S/P laparoscopic sleeve gastrectomy   • Primary osteoarthritis involving multiple joints   • Malabsorption due to intolerance, not elsewhere classified   • Primary insomnia   • Macromastia   • Encounter for annual physical exam   • Cervical strain       Allergies   Allergen Reactions   • Lisinopril-Hydrochlorothiazide Cough   • Penicillins  Hives   • Skelaxin [Metaxalone] Hives   • Sulfa Antibiotics Hives         Current Outpatient Medications:   •  BD Pen Needle Micro U/F 32G X 6 MM misc, USE TO INJECT ONCE DAILY, Disp: 100 each, Rfl: 0  •  cyclobenzaprine (FLEXERIL) 5 MG tablet, One at night prn back pain, Disp: 30 tablet, Rfl: 1  •  meloxicam (MOBIC) 15 MG tablet, One a day prn back pain, Disp: 30 tablet, Rfl: 3  •  multivitamin with minerals tablet tablet, Take 2 tablets by mouth Daily., Disp: , Rfl:   •  olmesartan (Benicar) 20 MG tablet, Take 1 tablet by mouth Daily., Disp: 90 tablet, Rfl: 1  •  Saxenda 18 MG/3ML injection pen, INJECT 3 MG UNDER THE SKIN INTO THE APPROPRIATE AREA AS DIRECTED DAILY., Disp: 15 mL, Rfl: 0  •  traZODone (DESYREL) 50 MG tablet, Take 1 tablet by mouth Every Night., Disp: 30 tablet, Rfl: 5    Past Medical History:   Diagnosis Date   • Abnormal uterine bleeding    • Acute foot pain, left    • Allergic rhinitis    • Anemia    • Ankle joint pain    • Bacterial vaginosis    • Benign essential hypertension    • BMI 50.0-59.9, adult (HCC)    • BPPV (benign paroxysmal positional vertigo)    • Daytime hypersomnolence    • Edema    • Hemorrhoids    • History of blood transfusion    • History of chickenpox    • Hx of bariatric surgery    • Hyperglycemia    • Hypocalcemia    • Immunization deficiency 9/28/2022   • Irregular menstrual cycle    • Irritability and anger    • Macromastia    • Menorrhagia    • Obesity    • Obstructive sleep apnea syndrome    • Optic nerve swelling    • Palpitations    • Sleep difficulties    • Snoring    • Strain of right trapezius muscle 9/28/2022   • Swelling    • Swelling of both ankles    • Trapezius muscle spasm    • Weight gain    • Wheezing        Past Surgical History:   Procedure Laterality Date   • BARIATRIC SURGERY     • BUNIONECTOMY Left 11/30/2021    left foot   • GASTRIC SLEEVE LAPAROSCOPIC N/A 04/18/2018    Procedure: GASTRIC SLEEVE LAPAROSCOPIC;  Surgeon: Christ Sunshine Jr., MD;   [place] [facility] [place] [facility] [place]  Service: Bariatric   • TUBAL ABDOMINAL LIGATION     • VAGINAL HYSTERECTOMY         Family History   Problem Relation Age of Onset   • Obesity Mother    • Hypertension Mother    • Stroke Mother    • Coronary artery disease Mother    • Sleep apnea Mother    • Hypertension Sister    • Hypertension Maternal Grandfather    • Malig Hyperthermia Neg Hx        Social History     Tobacco Use   • Smoking status: Never   • Smokeless tobacco: Never   Substance Use Topics   • Alcohol use: No            Objective     Vitals:    03/28/23 1258   BP: 136/80   Pulse: 81   Temp: 98.6 °F (37 °C)   SpO2: 100%     Body mass index is 39.47 kg/m².    Physical Exam  Vitals reviewed.   Constitutional:       Appearance: She is well-developed. She is not diaphoretic.   HENT:      Head: Normocephalic and atraumatic.   Eyes:      General: No scleral icterus.     Pupils: Pupils are equal, round, and reactive to light.   Neck:      Thyroid: No thyromegaly.   Cardiovascular:      Rate and Rhythm: Normal rate and regular rhythm.      Heart sounds: No murmur heard.    No friction rub. No gallop.   Pulmonary:      Effort: Pulmonary effort is normal. No respiratory distress.      Breath sounds: No wheezing or rales.   Chest:      Chest wall: No tenderness.   Abdominal:      General: Bowel sounds are normal. There is no distension.      Palpations: Abdomen is soft.      Tenderness: There is no abdominal tenderness.   Musculoskeletal:         General: No deformity. Normal range of motion.   Lymphadenopathy:      Cervical: No cervical adenopathy.   Skin:     General: Skin is warm and dry.      Findings: No rash.   Neurological:      Cranial Nerves: No cranial nerve deficit.      Motor: No abnormal muscle tone.         Lab Results   Component Value Date    GLUCOSE 73 01/26/2022    BUN 6 01/26/2022    CREATININE 0.98 01/26/2022    EGFRIFNONA 68 01/26/2022    EGFRIFAFRI 79 01/26/2022    BCR 6 (L) 01/26/2022    K 4.3 01/26/2022     CO2 24 01/26/2022    CALCIUM 8.9 01/26/2022    PROTENTOTREF 6.3 01/26/2022    ALBUMIN 4.0 01/26/2022    LABIL2 1.7 01/26/2022    AST 14 01/26/2022    ALT 12 01/26/2022       WBC   Date Value Ref Range Status   01/26/2022 6.0 3.4 - 10.8 x10E3/uL Final     RBC   Date Value Ref Range Status   01/26/2022 4.51 3.77 - 5.28 x10E6/uL Final     Hemoglobin   Date Value Ref Range Status   01/26/2022 12.9 11.1 - 15.9 g/dL Final   07/12/2018 12.3 11.9 - 15.5 g/dL Final     Hematocrit   Date Value Ref Range Status   01/26/2022 38.8 34.0 - 46.6 % Final   07/12/2018 36.2 35.6 - 45.5 % Final     MCV   Date Value Ref Range Status   01/26/2022 86 79 - 97 fL Final   07/12/2018 83.8 80.5 - 98.2 fL Final     MCH   Date Value Ref Range Status   01/26/2022 28.6 26.6 - 33.0 pg Final   07/12/2018 28.5 26.9 - 32.0 pg Final     MCHC   Date Value Ref Range Status   01/26/2022 33.2 31.5 - 35.7 g/dL Final   07/12/2018 34.0 32.4 - 36.3 g/dL Final     RDW   Date Value Ref Range Status   01/26/2022 13.8 11.7 - 15.4 % Final   07/12/2018 15.3 (H) 11.7 - 13.0 % Final     RDW-SD   Date Value Ref Range Status   07/12/2018 47.3 37.0 - 54.0 fl Final     MPV   Date Value Ref Range Status   07/12/2018 10.6 6.0 - 12.0 fL Final     Platelets   Date Value Ref Range Status   01/26/2022 329 150 - 450 x10E3/uL Final   07/12/2018 371 140 - 500 10*3/mm3 Final     Neutrophil Rel %   Date Value Ref Range Status   01/26/2022 52 Not Estab. % Final     Neutrophil %   Date Value Ref Range Status   07/12/2018 47.4 42.7 - 76.0 % Final     Lymphocyte Rel %   Date Value Ref Range Status   01/26/2022 39 Not Estab. % Final     Lymphocyte %   Date Value Ref Range Status   07/12/2018 45.8 (H) 19.6 - 45.3 % Final     Monocyte Rel %   Date Value Ref Range Status   01/26/2022 6 Not Estab. % Final     Monocyte %   Date Value Ref Range Status   07/12/2018 4.4 (L) 5.0 - 12.0 % Final     Eosinophil Rel %   Date Value Ref Range Status   01/26/2022 2 Not Estab. % Final     Eosinophil %    Date Value Ref Range Status   07/12/2018 1.9 0.3 - 6.2 % Final     Basophil Rel %   Date Value Ref Range Status   01/26/2022 1 Not Estab. % Final     Basophil %   Date Value Ref Range Status   07/12/2018 0.5 0.0 - 1.5 % Final     Immature Grans %   Date Value Ref Range Status   07/12/2018 0.1 0.0 - 0.5 % Final     Neutrophils Absolute   Date Value Ref Range Status   01/26/2022 3.1 1.4 - 7.0 x10E3/uL Final     Neutrophils, Absolute   Date Value Ref Range Status   07/12/2018 4.01 1.90 - 8.10 10*3/mm3 Final     Lymphocytes Absolute   Date Value Ref Range Status   01/26/2022 2.3 0.7 - 3.1 x10E3/uL Final     Lymphocytes, Absolute   Date Value Ref Range Status   07/12/2018 3.87 0.90 - 4.80 10*3/mm3 Final     Monocytes Absolute   Date Value Ref Range Status   01/26/2022 0.4 0.1 - 0.9 x10E3/uL Final     Monocytes, Absolute   Date Value Ref Range Status   07/12/2018 0.37 0.20 - 1.20 10*3/mm3 Final     Eosinophils Absolute   Date Value Ref Range Status   01/26/2022 0.1 0.0 - 0.4 x10E3/uL Final     Eosinophils, Absolute   Date Value Ref Range Status   07/12/2018 0.16 0.00 - 0.70 10*3/mm3 Final     Basophils Absolute   Date Value Ref Range Status   01/26/2022 0.1 0.0 - 0.2 x10E3/uL Final     Basophils, Absolute   Date Value Ref Range Status   07/12/2018 0.04 0.00 - 0.20 10*3/mm3 Final     Immature Grans, Absolute   Date Value Ref Range Status   07/12/2018 0.01 0.00 - 0.03 10*3/mm3 Final     nRBC   Date Value Ref Range Status   07/27/2021 0.0 0.0 - 0.2 /100 WBC Final       Lab Results   Component Value Date    HGBA1C 5.4 01/26/2022       Lab Results   Component Value Date    [id number] 844 07/27/2021       TSH   Date Value Ref Range Status   12/19/2017 0.915 0.270 - 4.200 mIU/mL Final       Lab Results   Component Value Date    CHOL 150 12/19/2017     Lab Results   Component Value Date    TRIG 73 01/26/2022     Lab Results   Component Value Date    HDL 54 01/26/2022     Lab Results   Component Value Date    LDL 76 01/26/2022      Lab Results   Component Value Date    VLDL 14 01/26/2022     Lab Results   Component Value Date    LDLHDL 2.63 12/19/2017         Procedures    Assessment & Plan   Problems Addressed this Visit     Benign essential hypertension    Relevant Medications    olmesartan (Benicar) 20 MG tablet    Other Relevant Orders    Lipid Panel With / Chol / HDL Ratio    TSH Rfx On Abnormal To Free T4    Cervical strain    Encounter for annual physical exam - Primary    Hyperglycemia    Relevant Orders    Hemoglobin A1c    Macromastia    Relevant Orders    Ambulatory Referral to Breast Surgery    Malabsorption due to intolerance, not elsewhere classified    Relevant Orders    Comprehensive Metabolic Panel    CBC & Differential    Vitamin B12   Diagnoses       Codes Comments    Encounter for annual physical exam    -  Primary ICD-10-CM: Z00.00  ICD-9-CM: V70.0     Hyperglycemia     ICD-10-CM: R73.9  ICD-9-CM: 790.29     Benign essential hypertension     ICD-10-CM: I10  ICD-9-CM: 401.1     Macromastia     ICD-10-CM: N62  ICD-9-CM: 611.1     Strain of neck muscle, initial encounter     ICD-10-CM: S16.1XXA  ICD-9-CM: 847.0     Malabsorption due to intolerance, not elsewhere classified     ICD-10-CM: K90.49  ICD-9-CM: 579.8       Macromastia with chronic cervical strain.  Likely need breast reduction.  To plastics.    Preventive Counseling:  Encouraged to stay active.  Covid vaccine UTD.  HEP A UTD.  Pneumovax UTD.  Colonoscopy UTD.    Dentist UTD.  Optho UTD.      Orders Placed This Encounter   Procedures   • Comprehensive Metabolic Panel     Order Specific Question:   Release to patient     Answer:   Routine Release   • Hemoglobin A1c     Order Specific Question:   Release to patient     Answer:   Routine Release   • Lipid Panel With / Chol / HDL Ratio     Order Specific Question:   Release to patient     Answer:   Routine Release   • TSH Rfx On Abnormal To Free T4     Order Specific Question:   Release to patient     Answer:    Routine Release   • Vitamin B12     Order Specific Question:   Release to patient     Answer:   Routine Release   • Ambulatory Referral to Breast Surgery     Referral Priority:   Routine     Referral Type:   Consultation     Referral Reason:   Specialty Services Required     Referred to Provider:   Marilynn Rob MD     Requested Specialty:   Breast Surgery     Number of Visits Requested:   1   • CBC & Differential     Order Specific Question:   Manual Differential     Answer:   No       Current Outpatient Medications   Medication Sig Dispense Refill   • BD Pen Needle Micro U/F 32G X 6 MM misc USE TO INJECT ONCE DAILY 100 each 0   • cyclobenzaprine (FLEXERIL) 5 MG tablet One at night prn back pain 30 tablet 1   • meloxicam (MOBIC) 15 MG tablet One a day prn back pain 30 tablet 3   • multivitamin with minerals tablet tablet Take 2 tablets by mouth Daily.     • olmesartan (Benicar) 20 MG tablet Take 1 tablet by mouth Daily. 90 tablet 1   • Saxenda 18 MG/3ML injection pen INJECT 3 MG UNDER THE SKIN INTO THE APPROPRIATE AREA AS DIRECTED DAILY. 15 mL 0   • traZODone (DESYREL) 50 MG tablet Take 1 tablet by mouth Every Night. 30 tablet 5     No current facility-administered medications for this visit.       Shruthi HARPER Overall had no medications administered during this visit.    Return in about 6 months (around 9/28/2023).    There are no Patient Instructions on file for this visit.

## 2023-03-29 ENCOUNTER — TELEPHONE (OUTPATIENT)
Dept: FAMILY MEDICINE CLINIC | Facility: CLINIC | Age: 48
End: 2023-03-29
Payer: COMMERCIAL

## 2023-03-29 DIAGNOSIS — N62 MACROMASTIA: Primary | ICD-10-CM

## 2023-03-29 LAB
ALBUMIN SERPL-MCNC: 4.5 G/DL (ref 3.5–5.2)
ALBUMIN/GLOB SERPL: 2.3 G/DL
ALP SERPL-CCNC: 72 U/L (ref 39–117)
ALT SERPL-CCNC: 13 U/L (ref 1–33)
AST SERPL-CCNC: 12 U/L (ref 1–32)
BASOPHILS # BLD AUTO: 0.07 10*3/MM3 (ref 0–0.2)
BASOPHILS NFR BLD AUTO: 0.9 % (ref 0–1.5)
BILIRUB SERPL-MCNC: 0.3 MG/DL (ref 0–1.2)
BUN SERPL-MCNC: 12 MG/DL (ref 6–20)
BUN/CREAT SERPL: 11.5 (ref 7–25)
CALCIUM SERPL-MCNC: 9.5 MG/DL (ref 8.6–10.5)
CHLORIDE SERPL-SCNC: 105 MMOL/L (ref 98–107)
CHOLEST SERPL-MCNC: 155 MG/DL (ref 0–200)
CHOLEST/HDLC SERPL: 3.04 {RATIO}
CO2 SERPL-SCNC: 25.9 MMOL/L (ref 22–29)
CREAT SERPL-MCNC: 1.04 MG/DL (ref 0.57–1)
EGFRCR SERPLBLD CKD-EPI 2021: 66 ML/MIN/1.73
EOSINOPHIL # BLD AUTO: 0.12 10*3/MM3 (ref 0–0.4)
EOSINOPHIL NFR BLD AUTO: 1.5 % (ref 0.3–6.2)
ERYTHROCYTE [DISTWIDTH] IN BLOOD BY AUTOMATED COUNT: 14 % (ref 12.3–15.4)
GLOBULIN SER CALC-MCNC: 2 GM/DL
GLUCOSE SERPL-MCNC: 73 MG/DL (ref 65–99)
HBA1C MFR BLD: 5.1 % (ref 4.8–5.6)
HCT VFR BLD AUTO: 39.1 % (ref 34–46.6)
HDLC SERPL-MCNC: 51 MG/DL (ref 40–60)
HGB BLD-MCNC: 13 G/DL (ref 12–15.9)
IMM GRANULOCYTES # BLD AUTO: 0.01 10*3/MM3 (ref 0–0.05)
IMM GRANULOCYTES NFR BLD AUTO: 0.1 % (ref 0–0.5)
LDLC SERPL CALC-MCNC: 92 MG/DL (ref 0–100)
LYMPHOCYTES # BLD AUTO: 3.7 10*3/MM3 (ref 0.7–3.1)
LYMPHOCYTES NFR BLD AUTO: 47.3 % (ref 19.6–45.3)
MCH RBC QN AUTO: 28.8 PG (ref 26.6–33)
MCHC RBC AUTO-ENTMCNC: 33.2 G/DL (ref 31.5–35.7)
MCV RBC AUTO: 86.7 FL (ref 79–97)
MONOCYTES # BLD AUTO: 0.35 10*3/MM3 (ref 0.1–0.9)
MONOCYTES NFR BLD AUTO: 4.5 % (ref 5–12)
NEUTROPHILS # BLD AUTO: 3.58 10*3/MM3 (ref 1.7–7)
NEUTROPHILS NFR BLD AUTO: 45.7 % (ref 42.7–76)
NRBC BLD AUTO-RTO: 0 /100 WBC (ref 0–0.2)
PLATELET # BLD AUTO: 398 10*3/MM3 (ref 140–450)
POTASSIUM SERPL-SCNC: 4.1 MMOL/L (ref 3.5–5.2)
PROT SERPL-MCNC: 6.5 G/DL (ref 6–8.5)
RBC # BLD AUTO: 4.51 10*6/MM3 (ref 3.77–5.28)
SODIUM SERPL-SCNC: 141 MMOL/L (ref 136–145)
TRIGL SERPL-MCNC: 57 MG/DL (ref 0–150)
TSH SERPL DL<=0.005 MIU/L-ACNC: 0.56 UIU/ML (ref 0.27–4.2)
VIT B12 SERPL-MCNC: 822 PG/ML (ref 211–946)
VLDLC SERPL CALC-MCNC: 12 MG/DL (ref 5–40)
WBC # BLD AUTO: 7.83 10*3/MM3 (ref 3.4–10.8)

## 2023-03-29 NOTE — TELEPHONE ENCOUNTER
Ok for hub and  can read message    Tell her I have referred her to another surgeon for plastic surgery(Dr Sandhu here in town).

## 2023-03-29 NOTE — TELEPHONE ENCOUNTER
Sabrina with Dr. Rob's office called to inform that patient would need a referral to a new office because they require plastic surgery which Dr. Rob does not do.

## 2023-04-14 ENCOUNTER — OFFICE VISIT (OUTPATIENT)
Dept: BARIATRICS/WEIGHT MGMT | Facility: CLINIC | Age: 48
End: 2023-04-14
Payer: COMMERCIAL

## 2023-04-14 VITALS
TEMPERATURE: 97.8 F | HEIGHT: 65 IN | WEIGHT: 235 LBS | BODY MASS INDEX: 39.15 KG/M2 | DIASTOLIC BLOOD PRESSURE: 82 MMHG | HEART RATE: 76 BPM | SYSTOLIC BLOOD PRESSURE: 142 MMHG

## 2023-04-14 DIAGNOSIS — I10 BENIGN ESSENTIAL HYPERTENSION: ICD-10-CM

## 2023-04-14 DIAGNOSIS — R53.82 CHRONIC FATIGUE: ICD-10-CM

## 2023-04-14 DIAGNOSIS — Z98.84 S/P LAPAROSCOPIC SLEEVE GASTRECTOMY: ICD-10-CM

## 2023-04-14 DIAGNOSIS — G47.33 OSA (OBSTRUCTIVE SLEEP APNEA): ICD-10-CM

## 2023-04-14 DIAGNOSIS — E66.9 OBESITY, CLASS II, BMI 35-39.9: Primary | ICD-10-CM

## 2023-04-14 PROBLEM — E66.813 OBESITY, CLASS III, BMI 40-49.9 (MORBID OBESITY): Status: RESOLVED | Noted: 2018-04-18 | Resolved: 2023-04-14

## 2023-04-14 PROBLEM — E66.01 OBESITY, CLASS III, BMI 40-49.9 (MORBID OBESITY): Status: RESOLVED | Noted: 2018-04-18 | Resolved: 2023-04-14

## 2023-04-14 RX ORDER — LIRAGLUTIDE 6 MG/ML
3 INJECTION, SOLUTION SUBCUTANEOUS TAKE AS DIRECTED
Qty: 15 ML | Refills: 2 | Status: SHIPPED | OUTPATIENT
Start: 2023-04-14

## 2023-04-14 NOTE — PROGRESS NOTES
[facility]IATRIC SURGERY  [address] [facility] [address]  [facility] [address]  [phone]  [address] [facility] [address]  [facility] [id number] [phone]  Dept: [phone]  4/14/2023      Shruthi Dockery.  [id number]  [id number]  1975  female      Chief Complaint   Patient presents with   • Follow-up     Fup sleeve saxenda          Post-Op Bariatric Surgery:   Shruthi HEWITT Overall is status post Laparoscopic Sleeve procedure, performed on 4/18/18 who has been taking saxenda since December and has lost 15lbs since starting. 4% of total body weight from drug start would have been 10lb.     HPI:   Today's weight is 107 kg (235 lb) pounds, today's BMI is Body mass index is 39.11 kg/m².,@ has a  loss of 10 pounds since the last visit and@ weight loss since surgery is 65 pounds. The patient reports a decreased portion size and loss of appetite. She denies headaches, constipation, or nausea since starting the medication but does note smaller portions.    Shruthi HEWITT Overall denies nausea, vomiting, reflux and reports that she is tolerating the medication well. She is able to eat 1 chicken leg . She is getting around 4-5oz per meal. She is eating two snacks per day that also have protein and has eggs daily for breakfast.      Diet and Exercise: Diet history reviewed and discussed with the patient. Weight loss/gains to date discussed with the patient. The patient states they are eating 50-60 grams of protein per day. She reports eating 3 meals per day, a typical portion size of 1/2 cup, eating 1-2 snacks per day, drinking 5-6 or more 8-oz. glasses of water per day, no carbonated beverage consumption and exercising regularly- walking a few days per week, and working with a     Supplements: cardio and strength 3-4 days per week.     Review of Systems   Constitutional: Positive for appetite change. Negative for fatigue and unexpected weight change.   HENT:  Negative.    Eyes: Negative.    Respiratory: Negative.    Cardiovascular: Negative.  Negative for leg swelling.   Gastrointestinal: Negative for abdominal distention, abdominal pain, constipation, diarrhea, nausea and vomiting.   Genitourinary: Negative for difficulty urinating, frequency and urgency.   Musculoskeletal: Negative for back pain.   Skin: Negative.    Psychiatric/Behavioral: Negative.    All other systems reviewed and are negative.      Patient Active Problem List   Diagnosis   • Chronic fatigue   • Edema   • TERRI (obstructive sleep apnea)   • Obesity, Class II, BMI 35-39.9   • Allergic rhinitis   • Anemia   • Benign essential hypertension   • BPPV (benign paroxysmal positional vertigo)   • Hyperglycemia   • Wheezing   • Palpitations   • S/P laparoscopic sleeve gastrectomy   • Primary osteoarthritis involving multiple joints   • Malabsorption due to intolerance, not elsewhere classified   • Primary insomnia   • Macromastia   • Encounter for annual physical exam   • Cervical strain       Past Medical History:   Diagnosis Date   • Abnormal uterine bleeding    • Acute foot pain, left    • Allergic rhinitis    • Anemia    • Ankle joint pain    • Bacterial vaginosis    • Benign essential hypertension    • BMI 50.0-59.9, adult    • BPPV (benign paroxysmal positional vertigo)    • Daytime hypersomnolence    • Edema    • Hemorrhoids    • History of blood transfusion    • History of chickenpox    • Hx of bariatric surgery    • Hyperglycemia    • Hypocalcemia    • Immunization deficiency 9/28/2022   • Irregular menstrual cycle    • Irritability and anger    • Macromastia    • Menorrhagia    • Obesity    • Obstructive sleep apnea syndrome    • Optic nerve swelling    • Palpitations    • Sleep difficulties    • Snoring    • Strain of right trapezius muscle 9/28/2022   • Swelling    • Swelling of both ankles    • Trapezius muscle spasm    • Weight gain    • Wheezing        The following portions of the patient's  history were reviewed and updated as appropriate: allergies, current medications, past family history, past medical history, past social history, past surgical history and problem list.    Vitals:    04/14/23 0844   BP: 142/82   Pulse: 76   Temp: 97.8 °F (36.6 °C)       Physical Exam  Vitals and nursing note reviewed.   Constitutional:       Appearance: She is well-developed.   Neck:      Thyroid: No thyromegaly.   Cardiovascular:      Rate and Rhythm: Normal rate.   Pulmonary:      Effort: Pulmonary effort is normal. No respiratory distress.   Abdominal:      Palpations: Abdomen is soft.   Musculoskeletal:         General: No tenderness.   Skin:     General: Skin is warm and dry.   Neurological:      Mental Status: She is alert.   Psychiatric:         Behavior: Behavior normal.         Assessment:   Post-op, the patient is doing well.     Encounter Diagnoses   Name Primary?   • Obesity, Class II, BMI 35-39.9 Yes   • S/P laparoscopic sleeve gastrectomy    • TERRI (obstructive sleep apnea)    • Chronic fatigue    • Benign essential hypertension        Plan:   Will continue saxenda at the 3mg dose.   Encouraged patient to be sure to get plenty of lean protein per day through small frequent meals all with a protein source.   Activity restrictions: none.   Recommended patient be sure to get at least 70 grams of protein per day by eating small, frequent meals all with high lean protein choices. Be sure to limit/cut back on daily carbohydrate intake. Discussed with the patient the recommended amount of water per day to intake- half of body weight in ounces. Reviewed vitamin requirements. Be sure to do routine exercise, 150 minutes per week minimum, including both cardio and strength training.     Instructions / Recommendations: dietary counseling recommended, recommended a daily protein intake of  grams, vitamin supplement(s) recommended, recommended exercising at least 150 minutes per week, behavior modifications  recommended and instructed to call the office for concerns, questions, or problems.     The patient was instructed to follow up in 3-6 months .   Total time spent during this encounter today was 25 minutes

## 2023-07-14 PROBLEM — R22.1 LUMP IN NECK: Status: ACTIVE | Noted: 2023-07-14

## 2023-08-07 ENCOUNTER — OFFICE VISIT (OUTPATIENT)
Dept: FAMILY MEDICINE CLINIC | Facility: CLINIC | Age: 48
End: 2023-08-07
Payer: COMMERCIAL

## 2023-08-07 VITALS
TEMPERATURE: 98.5 F | BODY MASS INDEX: 40.48 KG/M2 | DIASTOLIC BLOOD PRESSURE: 90 MMHG | WEIGHT: 243 LBS | HEIGHT: 65 IN | SYSTOLIC BLOOD PRESSURE: 136 MMHG | HEART RATE: 70 BPM | OXYGEN SATURATION: 98 %

## 2023-08-07 DIAGNOSIS — E83.51 HYPOCALCEMIA: ICD-10-CM

## 2023-08-07 DIAGNOSIS — E07.9 SWELLING OF THYROID GLAND: Primary | ICD-10-CM

## 2023-08-07 PROCEDURE — 99214 OFFICE O/P EST MOD 30 MIN: CPT | Performed by: FAMILY MEDICINE

## 2023-08-07 RX ORDER — FLUTICASONE PROPIONATE 50 MCG
SPRAY, SUSPENSION (ML) NASAL
COMMUNITY
Start: 2023-08-01

## 2023-08-07 RX ORDER — PANTOPRAZOLE SODIUM 40 MG/1
TABLET, DELAYED RELEASE ORAL
COMMUNITY
Start: 2023-08-01

## 2023-08-07 RX ORDER — HYDROCODONE BITARTRATE AND ACETAMINOPHEN 5; 325 MG/1; MG/1
TABLET ORAL
COMMUNITY
Start: 2023-07-27

## 2023-08-07 NOTE — PROGRESS NOTES
Chief Complaint   Patient presents with    Hypothyroidism       Wale Dockery is a 47 y.o. female.     Hypothyroidism  Pertinent negatives include no abdominal pain, arthralgias, chest pain, fatigue, myalgias, rash, sore throat or weakness.    C/o swelling of thyroid.  Noted in last few weeks.    F/U hypocalcemia.  Ca was 8.1 3 months ago.      The following portions of the patient's history were reviewed and updated as appropriate: allergies, current medications, past family history, past medical history, past social history, past surgical history and problem list.    Review of Systems   Constitutional:  Negative for appetite change and fatigue.   HENT:  Negative for nosebleeds and sore throat.    Eyes:  Negative for blurred vision and visual disturbance.   Respiratory:  Negative for shortness of breath and wheezing.    Cardiovascular:  Negative for chest pain and leg swelling.   Gastrointestinal:  Negative for abdominal distention and abdominal pain.   Endocrine: Negative for cold intolerance and polyuria.   Genitourinary:  Negative for dysuria and hematuria.   Musculoskeletal:  Negative for arthralgias and myalgias.   Skin:  Negative for color change and rash.   Neurological:  Negative for weakness and confusion.   Psychiatric/Behavioral:  Negative for agitation and depressed mood.      Patient Active Problem List   Diagnosis    Chronic fatigue    Edema    TERRI (obstructive sleep apnea)    Obesity, Class II, BMI 35-39.9    Allergic rhinitis    Anemia    Benign essential hypertension    BPPV (benign paroxysmal positional vertigo)    Hyperglycemia    Wheezing    Palpitations    S/P laparoscopic sleeve gastrectomy    Primary osteoarthritis involving multiple joints    Malabsorption due to intolerance, not elsewhere classified    Primary insomnia    Macromastia    Encounter for annual physical exam    Cervical strain    Lump in neck    Swelling of thyroid gland    Hypocalcemia       Allergies    Allergen Reactions    Lisinopril-Hydrochlorothiazide Cough    Penicillins Hives    Skelaxin [Metaxalone] Hives    Sulfa Antibiotics Hives         Current Outpatient Medications:     BD Pen Needle Micro U/F 32G X 6 MM misc, USE TO INJECT ONCE DAILY, Disp: 100 each, Rfl: 0    fluticasone (FLONASE) 50 MCG/ACT nasal spray, SPRAY TWICE IN EACH NOSTRIL TOWARDS THE OUTSIDE OF THE NOSE ONCE DAILY AND DON'T INHALE, Disp: , Rfl:     HYDROcodone-acetaminophen (NORCO) 5-325 MG per tablet, TAKE 1 TABLET AS NEEDE ORALLY EVERY 6 HRS 5 DAYS, Disp: , Rfl:     Liraglutide (Saxenda) 18 MG/3ML injection pen, Inject 3 mg under the skin into the appropriate area as directed Take As Directed., Disp: 15 mL, Rfl: 5    multivitamin with minerals tablet tablet, Take 2 tablets by mouth Daily., Disp: , Rfl:     olmesartan (Benicar) 20 MG tablet, Take 1 tablet by mouth Daily., Disp: 90 tablet, Rfl: 1    pantoprazole (PROTONIX) 40 MG EC tablet, TAKE ONE ORALLY DAILY 30 MINUTES TO 2 HOURS BEFORE DINNER, Disp: , Rfl:     Past Medical History:   Diagnosis Date    Abnormal uterine bleeding     Acute foot pain, left     Allergic rhinitis     Anemia     Ankle joint pain     Bacterial vaginosis     Benign essential hypertension     BMI 50.0-59.9, adult     BPPV (benign paroxysmal positional vertigo)     Daytime hypersomnolence     Edema     Hemorrhoids     History of blood transfusion     History of chickenpox     Hx of bariatric surgery     Hyperglycemia     Hypocalcemia     Immunization deficiency 9/28/2022    Irregular menstrual cycle     Irritability and anger     Macromastia     Menorrhagia     Obesity     Obstructive sleep apnea syndrome     Optic nerve swelling     Palpitations     Sleep difficulties     Snoring     Strain of right trapezius muscle 9/28/2022    Swelling     Swelling of both ankles     Trapezius muscle spasm     Weight gain     Wheezing        Past Surgical History:   Procedure Laterality Date    BARIATRIC SURGERY       BUNIONECTOMY Left 11/30/2021    left foot    GASTRIC SLEEVE LAPAROSCOPIC N/A 04/18/2018    Procedure: GASTRIC SLEEVE LAPAROSCOPIC;  Surgeon: Christ Sunshine Jr., MD;  Location: [facility] [place];  Service: Bariatric    TUBAL ABDOMINAL LIGATION      VAGINAL HYSTERECTOMY         Family History   Problem Relation Age of Onset    Obesity Mother     Hypertension Mother     Stroke Mother     Coronary artery disease Mother     Sleep apnea Mother     Hypertension Sister     Hypertension Maternal Grandfather     Malig Hyperthermia Neg Hx        Social History     Tobacco Use    Smoking status: Never    Smokeless tobacco: Never   Substance Use Topics    Alcohol use: No            Objective     Vitals:    08/07/23 1525   BP: 136/90   Pulse: 70   Temp: 98.5 øF (36.9 øC)   SpO2: 98%     Body mass index is 40.44 kg/mý.    Physical Exam  Vitals reviewed.   Constitutional:       Appearance: She is well-developed. She is not diaphoretic.   HENT:      Head: Normocephalic and atraumatic.   Eyes:      General: No scleral icterus.     Pupils: Pupils are equal, round, and reactive to light.   Neck:      Thyroid: No thyromegaly.      Comments: Thyroid generalized bilaterally with swelling  Cardiovascular:      Rate and Rhythm: Normal rate and regular rhythm.      Heart sounds: No murmur heard.    No friction rub. No gallop.   Pulmonary:      Effort: Pulmonary effort is normal. No respiratory distress.      Breath sounds: No wheezing or rales.   Chest:      Chest wall: No tenderness.   Abdominal:      General: Bowel sounds are normal. There is no distension.      Palpations: Abdomen is soft.      Tenderness: There is no abdominal tenderness.   Musculoskeletal:         General: No deformity. Normal range of motion.   Lymphadenopathy:      Cervical: No cervical adenopathy.   Skin:     General: Skin is warm and dry.      Findings: No rash.   Neurological:      Cranial Nerves: No cranial nerve deficit.      Motor: No abnormal muscle tone.        Lab Results   Component Value Date    GLUCOSE 73 03/28/2023    BUN 12 03/28/2023    CREATININE 1.04 (H) 03/28/2023    EGFRIFNONA 68 01/26/2022    EGFRIFAFRI 79 01/26/2022    BCR 11.5 03/28/2023    K 4.1 03/28/2023    CO2 25.9 03/28/2023    CALCIUM 9.5 03/28/2023    PROTENTOTREF 6.5 03/28/2023    ALBUMIN 4.5 03/28/2023    LABIL2 2.3 03/28/2023    AST 12 03/28/2023    ALT 13 03/28/2023       WBC   Date Value Ref Range Status   03/28/2023 7.83 3.40 - 10.80 10*3/mm3 Final     RBC   Date Value Ref Range Status   03/28/2023 4.51 3.77 - 5.28 10*6/mm3 Final     Hemoglobin   Date Value Ref Range Status   03/28/2023 13.0 12.0 - 15.9 g/dL Final   07/12/2018 12.3 11.9 - 15.5 g/dL Final     Hematocrit   Date Value Ref Range Status   03/28/2023 39.1 34.0 - 46.6 % Final   07/12/2018 36.2 35.6 - 45.5 % Final     MCV   Date Value Ref Range Status   03/28/2023 86.7 79.0 - 97.0 fL Final   07/12/2018 83.8 80.5 - 98.2 fL Final     MCH   Date Value Ref Range Status   03/28/2023 28.8 26.6 - 33.0 pg Final   07/12/2018 28.5 26.9 - 32.0 pg Final     MCHC   Date Value Ref Range Status   03/28/2023 33.2 31.5 - 35.7 g/dL Final   07/12/2018 34.0 32.4 - 36.3 g/dL Final     RDW   Date Value Ref Range Status   03/28/2023 14.0 12.3 - 15.4 % Final   07/12/2018 15.3 (H) 11.7 - 13.0 % Final     RDW-SD   Date Value Ref Range Status   07/12/2018 47.3 37.0 - 54.0 fl Final     MPV   Date Value Ref Range Status   07/12/2018 10.6 6.0 - 12.0 fL Final     Platelets   Date Value Ref Range Status   03/28/2023 398 140 - 450 10*3/mm3 Final   07/12/2018 371 140 - 500 10*3/mm3 Final     Neutrophil Rel %   Date Value Ref Range Status   03/28/2023 45.7 42.7 - 76.0 % Final     Neutrophil %   Date Value Ref Range Status   07/12/2018 47.4 42.7 - 76.0 % Final     Lymphocyte Rel %   Date Value Ref Range Status   03/28/2023 47.3 (H) 19.6 - 45.3 % Final     Lymphocyte %   Date Value Ref Range Status   07/12/2018 45.8 (H) 19.6 - 45.3 % Final     Monocyte Rel %   Date  Value Ref Range Status   03/28/2023 4.5 (L) 5.0 - 12.0 % Final     Monocyte %   Date Value Ref Range Status   07/12/2018 4.4 (L) 5.0 - 12.0 % Final     Eosinophil Rel %   Date Value Ref Range Status   03/28/2023 1.5 0.3 - 6.2 % Final     Eosinophil %   Date Value Ref Range Status   07/12/2018 1.9 0.3 - 6.2 % Final     Basophil Rel %   Date Value Ref Range Status   03/28/2023 0.9 0.0 - 1.5 % Final     Basophil %   Date Value Ref Range Status   07/12/2018 0.5 0.0 - 1.5 % Final     Immature Grans %   Date Value Ref Range Status   07/12/2018 0.1 0.0 - 0.5 % Final     Neutrophils Absolute   Date Value Ref Range Status   04/17/2023 4.70 1.70 - 6.00 x10(3)/ul Final     Lymphocytes Absolute   Date Value Ref Range Status   03/28/2023 3.70 (H) 0.70 - 3.10 10*3/mm3 Final     Lymphocytes, Absolute   Date Value Ref Range Status   07/12/2018 3.87 0.90 - 4.80 10*3/mm3 Final     Monocytes Absolute   Date Value Ref Range Status   03/28/2023 0.35 0.10 - 0.90 10*3/mm3 Final     Monocytes, Absolute   Date Value Ref Range Status   07/12/2018 0.37 0.20 - 1.20 10*3/mm3 Final     Eosinophils Absolute   Date Value Ref Range Status   04/17/2023 0.1 0.0 - 0.6 x10(3)/ul Final     Basophils Absolute   Date Value Ref Range Status   04/17/2023 0.1 0.0 - 0.3 x10(3)/ul Final     Immature Grans, Absolute   Date Value Ref Range Status   07/12/2018 0.01 0.00 - 0.03 10*3/mm3 Final     nRBC   Date Value Ref Range Status   03/28/2023 0.0 0.0 - 0.2 /100 WBC Final       Lab Results   Component Value Date    HGBA1C 5.10 03/28/2023       Lab Results   Component Value Date    QFEFDODT29 822 03/28/2023       TSH   Date Value Ref Range Status   03/28/2023 0.555 0.270 - 4.200 uIU/mL Final   12/19/2017 0.915 0.270 - 4.200 mIU/mL Final       Lab Results   Component Value Date    CHOL 150 12/19/2017     Lab Results   Component Value Date    TRIG 57 03/28/2023     Lab Results   Component Value Date    HDL 51 03/28/2023     Lab Results   Component Value Date    LDL  92 03/28/2023     Lab Results   Component Value Date    VLDL 12 03/28/2023     Lab Results   Component Value Date    LDLHDL 2.63 12/19/2017         Procedures    Assessment & Plan   Problems Addressed this Visit       Hypocalcemia    Swelling of thyroid gland - Primary    Relevant Orders    US Thyroid     Diagnoses         Codes Comments    Swelling of thyroid gland    -  Primary ICD-10-CM: E07.9  ICD-9-CM: 246.9     Hypocalcemia     ICD-10-CM: E83.51  ICD-9-CM: 275.41         Thyroid swelling.  New problem, undiagnosed.  Check thyroid US.  TSH, Free T4 normal from 4/23.  Hypocalcemia.  New problem.  Calcium 8.1 from 3 month ago.Start citracal plus d daily.      Orders Placed This Encounter   Procedures    US Thyroid     Order Specific Question:   Reason for Exam:     Answer:   thyroid swelling     Order Specific Question:   Release to patient     Answer:   Routine Release       Current Outpatient Medications   Medication Sig Dispense Refill    BD Pen Needle Micro U/F 32G X 6 MM misc USE TO INJECT ONCE DAILY 100 each 0    fluticasone (FLONASE) 50 MCG/ACT nasal spray SPRAY TWICE IN EACH NOSTRIL TOWARDS THE OUTSIDE OF THE NOSE ONCE DAILY AND DON'T INHALE      HYDROcodone-acetaminophen (NORCO) 5-325 MG per tablet TAKE 1 TABLET AS NEEDE ORALLY EVERY 6 HRS 5 DAYS      Liraglutide (Saxenda) 18 MG/3ML injection pen Inject 3 mg under the skin into the appropriate area as directed Take As Directed. 15 mL 5    multivitamin with minerals tablet tablet Take 2 tablets by mouth Daily.      olmesartan (Benicar) 20 MG tablet Take 1 tablet by mouth Daily. 90 tablet 1    pantoprazole (PROTONIX) 40 MG EC tablet TAKE ONE ORALLY DAILY 30 MINUTES TO 2 HOURS BEFORE DINNER       No current facility-administered medications for this visit.       Shruthi Dockery had no medications administered during this visit.    No follow-ups on file.    There are no Patient Instructions on file for this visit.

## 2023-08-25 ENCOUNTER — HOSPITAL ENCOUNTER (OUTPATIENT)
Dept: ULTRASOUND IMAGING | Facility: HOSPITAL | Age: 48
Discharge: HOME | End: 2023-08-25
Admitting: FAMILY MEDICINE
Payer: COMMERCIAL

## 2023-08-25 PROCEDURE — 76536 US EXAM OF HEAD AND NECK: CPT

## 2023-08-29 ENCOUNTER — TELEPHONE (OUTPATIENT)
Dept: FAMILY MEDICINE CLINIC | Facility: CLINIC | Age: 48
End: 2023-08-29

## 2023-08-29 DIAGNOSIS — E07.9 SWELLING OF THYROID GLAND: ICD-10-CM

## 2023-08-29 DIAGNOSIS — E07.9 THYROID MASS: Primary | ICD-10-CM

## 2023-08-29 NOTE — TELEPHONE ENCOUNTER
Caller: Sarkis, Shruthi HEWITT    Relationship: Self    Best call back number: [phone]    What is the best time to reach you: ANYTIME    Who are you requesting to speak with (clinical staff, provider,  specific staff member): DR. STARKS    Do you know the name of the person who called: SELF    What was the call regarding: THE PATIENT WOULD LIKE TO KNOW IF SHE SHOULD WAIT TO GO OVER HER THYROID ULTRASOUND RESULTS WITH DR. STARKS AT HER NEXT APPOINTMENT IN SEPTEMBER. OR, THE PATIENT WOULD LIKE TO KNOW IF SHE SHOULD CONSULT WITH AN ENDOCRINOLOGIST OR ENT SPECIALIST. PLEASE ADVISE THE PATIENT ON WHAT NEXT STEPS TO TAKE.      Is it okay if the provider responds through MyChart: NO

## 2023-09-25 ENCOUNTER — OFFICE VISIT (OUTPATIENT)
Dept: FAMILY MEDICINE CLINIC | Facility: CLINIC | Age: 48
End: 2023-09-25

## 2023-09-25 VITALS
OXYGEN SATURATION: 97 % | SYSTOLIC BLOOD PRESSURE: 148 MMHG | BODY MASS INDEX: 41.19 KG/M2 | HEIGHT: 65 IN | DIASTOLIC BLOOD PRESSURE: 94 MMHG | WEIGHT: 247.2 LBS | TEMPERATURE: 98.6 F | HEART RATE: 82 BPM

## 2023-09-25 DIAGNOSIS — E83.51 HYPOCALCEMIA: ICD-10-CM

## 2023-09-25 DIAGNOSIS — S46.812A STRAIN OF LEFT TRAPEZIUS MUSCLE, INITIAL ENCOUNTER: Primary | ICD-10-CM

## 2023-09-25 DIAGNOSIS — I10 BENIGN ESSENTIAL HYPERTENSION: ICD-10-CM

## 2023-09-25 DIAGNOSIS — N62 MACROMASTIA: ICD-10-CM

## 2023-09-25 RX ORDER — CA/D3/MAG OX/ZINC/COP/MANG/BOR 600 MG-800
TABLET,CHEWABLE ORAL
Qty: 60 TABLET
Start: 2023-09-25

## 2023-09-25 RX ORDER — DIPHENOXYLATE HYDROCHLORIDE AND ATROPINE SULFATE 2.5; .025 MG/1; MG/1
TABLET ORAL DAILY
COMMUNITY

## 2023-09-25 RX ORDER — LEVOCETIRIZINE DIHYDROCHLORIDE 5 MG/1
TABLET, FILM COATED ORAL
COMMUNITY
Start: 2023-08-08

## 2023-09-25 RX ORDER — OLMESARTAN MEDOXOMIL 40 MG/1
40 TABLET ORAL DAILY
Qty: 90 TABLET | Refills: 3 | Status: SHIPPED | OUTPATIENT
Start: 2023-09-25

## 2023-09-25 RX ORDER — AMLODIPINE BESYLATE 2.5 MG/1
2.5 TABLET ORAL DAILY
Qty: 90 TABLET | Refills: 3 | Status: SHIPPED | OUTPATIENT
Start: 2023-09-25

## 2023-09-25 NOTE — PROGRESS NOTES
Chief Complaint   Patient presents with    Back Pain       Wale Dockery is a 47 y.o. female.     Back Pain     C/o pain in upper back for last 3-4 months.  Increasing pain on occasion.  Worse with movement.  No injury.  I sleep on my side.  Has H cup on bra.  F/U Htn.  No orthostasis.    F/U hypocalcemia. On caltrate BID otc.  The following portions of the patient's history were reviewed and updated as appropriate: allergies, current medications, past family history, past medical history, past social history, past surgical history and problem list.    Review of Systems   Gastrointestinal:  Negative for constipation and diarrhea.   Musculoskeletal:  Positive for back pain.     Patient Active Problem List   Diagnosis    Chronic fatigue    Edema    TERRI (obstructive sleep apnea)    Obesity, Class II, BMI 35-39.9    Allergic rhinitis    Anemia    Benign essential hypertension    BPPV (benign paroxysmal positional vertigo)    Hyperglycemia    Wheezing    Palpitations    S/P laparoscopic sleeve gastrectomy    Primary osteoarthritis involving multiple joints    Malabsorption due to intolerance, not elsewhere classified    Primary insomnia    Macromastia    Encounter for annual physical exam    Cervical strain    Thyroid mass    Swelling of thyroid gland    Hypocalcemia    Strain of left trapezius muscle       Allergies   Allergen Reactions    Lisinopril-Hydrochlorothiazide Cough    Penicillins Hives    Skelaxin [Metaxalone] Hives    Sulfa Antibiotics Hives         Current Outpatient Medications:     BD Pen Needle Micro U/F 32G X 6 MM misc, USE TO INJECT ONCE DAILY, Disp: 100 each, Rfl: 0    fluticasone (FLONASE) 50 MCG/ACT nasal spray, SPRAY TWICE IN EACH NOSTRIL TOWARDS THE OUTSIDE OF THE NOSE ONCE DAILY AND DON'T INHALE, Disp: , Rfl:     HYDROcodone-acetaminophen (NORCO) 5-325 MG per tablet, TAKE 1 TABLET AS NEEDE ORALLY EVERY 6 HRS 5 DAYS, Disp: , Rfl:     levocetirizine (XYZAL) 5 MG tablet, , Disp: ,  Rfl:     Liraglutide (Saxenda) 18 MG/3ML injection pen, Inject 3 mg under the skin into the appropriate area as directed Take As Directed., Disp: 15 mL, Rfl: 5    multivitamin (THERAGRAN) tablet tablet, Daily., Disp: , Rfl:     multivitamin with minerals tablet tablet, Take 2 tablets by mouth Daily., Disp: , Rfl:     olmesartan (Benicar) 40 MG tablet, Take 1 tablet by mouth Daily., Disp: 90 tablet, Rfl: 3    pantoprazole (PROTONIX) 40 MG EC tablet, TAKE ONE ORALLY DAILY 30 MINUTES TO 2 HOURS BEFORE DINNER, Disp: , Rfl:     amLODIPine (NORVASC) 2.5 MG tablet, Take 1 tablet by mouth Daily., Disp: 90 tablet, Rfl: 3    Calcium Carbonate-Vit D-Min (Caltrate 600+D Plus Minerals) 600-800 MG-UNIT chewable tablet, 1 BID, Disp: 60 tablet, Rfl:     Past Medical History:   Diagnosis Date    Abnormal uterine bleeding     Acute foot pain, left     Allergic rhinitis     Anemia     Ankle joint pain     Bacterial vaginosis     Benign essential hypertension     BMI 50.0-59.9, adult     BPPV (benign paroxysmal positional vertigo)     Daytime hypersomnolence     Edema     Hemorrhoids     History of blood transfusion     History of chickenpox     Hx of bariatric surgery     Hyperglycemia     Hypocalcemia     Immunization deficiency 9/28/2022    Irregular menstrual cycle     Irritability and anger     Macromastia     Menorrhagia     Obesity     Obstructive sleep apnea syndrome     Optic nerve swelling     Palpitations     Sleep difficulties     Snoring     Strain of right trapezius muscle 9/28/2022    Swelling     Swelling of both ankles     Trapezius muscle spasm     Weight gain     Wheezing        Past Surgical History:   Procedure Laterality Date    BARIATRIC SURGERY      BUNIONECTOMY Left 11/30/2021    left foot    GASTRIC SLEEVE LAPAROSCOPIC N/A 04/18/2018    Procedure: GASTRIC SLEEVE LAPAROSCOPIC;  Surgeon: Christ Sunshine Jr., MD;  Location: [facility] OR [facility] [place];  Service: Bariatric    TUBAL ABDOMINAL LIGATION      VAGINAL  HYSTERECTOMY         Family History   Problem Relation Age of Onset    Obesity Mother     Hypertension Mother     Stroke Mother     Coronary artery disease Mother     Sleep apnea Mother     Hypertension Sister     Hypertension Maternal Grandfather     Malig Hyperthermia Neg Hx        Social History     Tobacco Use    Smoking status: Never    Smokeless tobacco: Never   Substance Use Topics    Alcohol use: No            Objective     Vitals:    09/25/23 1309   BP: 148/94   Pulse:    Temp:    SpO2:      Body mass index is 41.14 kg/m².    Physical Exam  Vitals reviewed.   Constitutional:       Appearance: She is well-developed. She is not diaphoretic.   HENT:      Head: Normocephalic and atraumatic.   Eyes:      General: No scleral icterus.     Pupils: Pupils are equal, round, and reactive to light.   Neck:      Thyroid: No thyromegaly.   Cardiovascular:      Rate and Rhythm: Normal rate and regular rhythm.      Heart sounds: No murmur heard.    No friction rub. No gallop.   Pulmonary:      Effort: Pulmonary effort is normal. No respiratory distress.      Breath sounds: No wheezing or rales.   Chest:      Chest wall: No tenderness.   Abdominal:      General: Bowel sounds are normal. There is no distension.      Palpations: Abdomen is soft.      Tenderness: There is no abdominal tenderness.   Musculoskeletal:         General: No deformity. Normal range of motion.      Comments: TTP over rhomboids /trap bilaterally.   Lymphadenopathy:      Cervical: No cervical adenopathy.   Skin:     General: Skin is warm and dry.      Findings: No rash.   Neurological:      Cranial Nerves: No cranial nerve deficit.      Motor: No abnormal muscle tone.       Lab Results   Component Value Date    GLUCOSE 73 03/28/2023    BUN 12 03/28/2023    CREATININE 1.04 (H) 03/28/2023    EGFRIFNONA 68 01/26/2022    EGFRIFAFRI 79 01/26/2022    BCR 11.5 03/28/2023    K 4.1 03/28/2023    CO2 25.9 03/28/2023    CALCIUM 9.5 03/28/2023    PROTENTOTREF 6.5  03/28/2023    ALBUMIN 4.5 03/28/2023    LABIL2 2.3 03/28/2023    AST 12 03/28/2023    ALT 13 03/28/2023       WBC   Date Value Ref Range Status   03/28/2023 7.83 3.40 - 10.80 10*3/mm3 Final     RBC   Date Value Ref Range Status   03/28/2023 4.51 3.77 - 5.28 10*6/mm3 Final     Hemoglobin   Date Value Ref Range Status   03/28/2023 13.0 12.0 - 15.9 g/dL Final   07/12/2018 12.3 11.9 - 15.5 g/dL Final     Hematocrit   Date Value Ref Range Status   03/28/2023 39.1 34.0 - 46.6 % Final   07/12/2018 36.2 35.6 - 45.5 % Final     MCV   Date Value Ref Range Status   03/28/2023 86.7 79.0 - 97.0 fL Final   07/12/2018 83.8 80.5 - 98.2 fL Final     MCH   Date Value Ref Range Status   03/28/2023 28.8 26.6 - 33.0 pg Final   07/12/2018 28.5 26.9 - 32.0 pg Final     MCHC   Date Value Ref Range Status   03/28/2023 33.2 31.5 - 35.7 g/dL Final   07/12/2018 34.0 32.4 - 36.3 g/dL Final     RDW   Date Value Ref Range Status   03/28/2023 14.0 12.3 - 15.4 % Final   07/12/2018 15.3 (H) 11.7 - 13.0 % Final     RDW-SD   Date Value Ref Range Status   07/12/2018 47.3 37.0 - 54.0 fl Final     MPV   Date Value Ref Range Status   07/12/2018 10.6 6.0 - 12.0 fL Final     Platelets   Date Value Ref Range Status   03/28/2023 398 140 - 450 10*3/mm3 Final   07/12/2018 371 140 - 500 10*3/mm3 Final     Neutrophil Rel %   Date Value Ref Range Status   03/28/2023 45.7 42.7 - 76.0 % Final     Neutrophil %   Date Value Ref Range Status   07/12/2018 47.4 42.7 - 76.0 % Final     Lymphocyte Rel %   Date Value Ref Range Status   03/28/2023 47.3 (H) 19.6 - 45.3 % Final     Lymphocyte %   Date Value Ref Range Status   07/12/2018 45.8 (H) 19.6 - 45.3 % Final     Monocyte Rel %   Date Value Ref Range Status   03/28/2023 4.5 (L) 5.0 - 12.0 % Final     Monocyte %   Date Value Ref Range Status   07/12/2018 4.4 (L) 5.0 - 12.0 % Final     Eosinophil Rel %   Date Value Ref Range Status   03/28/2023 1.5 0.3 - 6.2 % Final     Eosinophil %   Date Value Ref Range Status    07/12/2018 1.9 0.3 - 6.2 % Final     Basophil Rel %   Date Value Ref Range Status   03/28/2023 0.9 0.0 - 1.5 % Final     Basophil %   Date Value Ref Range Status   07/12/2018 0.5 0.0 - 1.5 % Final     Immature Grans %   Date Value Ref Range Status   07/12/2018 0.1 0.0 - 0.5 % Final     Neutrophils Absolute   Date Value Ref Range Status   04/17/2023 4.70 1.70 - 6.00 x10(3)/ul Final     Lymphocytes Absolute   Date Value Ref Range Status   03/28/2023 3.70 (H) 0.70 - 3.10 10*3/mm3 Final     Lymphocytes, Absolute   Date Value Ref Range Status   07/12/2018 3.87 0.90 - 4.80 10*3/mm3 Final     Monocytes Absolute   Date Value Ref Range Status   03/28/2023 0.35 0.10 - 0.90 10*3/mm3 Final     Monocytes, Absolute   Date Value Ref Range Status   07/12/2018 0.37 0.20 - 1.20 10*3/mm3 Final     Eosinophils Absolute   Date Value Ref Range Status   04/17/2023 0.1 0.0 - 0.6 x10(3)/ul Final     Basophils Absolute   Date Value Ref Range Status   04/17/2023 0.1 0.0 - 0.3 x10(3)/ul Final     Immature Grans, Absolute   Date Value Ref Range Status   07/12/2018 0.01 0.00 - 0.03 10*3/mm3 Final     nRBC   Date Value Ref Range Status   03/28/2023 0.0 0.0 - 0.2 /100 WBC Final       Lab Results   Component Value Date    HGBA1C 5.10 03/28/2023       Lab Results   Component Value Date    VKAULZJG48 822 03/28/2023       TSH   Date Value Ref Range Status   03/28/2023 0.555 0.270 - 4.200 uIU/mL Final   12/19/2017 0.915 0.270 - 4.200 mIU/mL Final       Lab Results   Component Value Date    CHOL 150 12/19/2017     Lab Results   Component Value Date    TRIG 57 03/28/2023     Lab Results   Component Value Date    HDL 51 03/28/2023     Lab Results   Component Value Date    LDL 92 03/28/2023     Lab Results   Component Value Date    VLDL 12 03/28/2023     Lab Results   Component Value Date    LDLHDL 2.63 12/19/2017         Procedures    Assessment & Plan   Problems Addressed this Visit       Benign essential hypertension    Relevant Medications     olmesartan (Benicar) 40 MG tablet    amLODIPine (NORVASC) 2.5 MG tablet    Other Relevant Orders    Basic Metabolic Panel    Hypocalcemia    Relevant Orders    Basic Metabolic Panel    Macromastia    Strain of left trapezius muscle - Primary     Diagnoses         Codes Comments    Strain of left trapezius muscle, initial encounter    -  Primary ICD-10-CM: S46.812A  ICD-9-CM: 840.8     Macromastia     ICD-10-CM: N62  ICD-9-CM: 611.1     Benign essential hypertension     ICD-10-CM: I10  ICD-9-CM: 401.1     Hypocalcemia     ICD-10-CM: E83.51  ICD-9-CM: 275.41         HTN.  Uncontrolled.  Increase olmesartan to 40 a day add amlo 2.5 a day.  B trap strain with macromastia.  Likely macromastia causing issues.  Start PT.  IF no better, to plastics.    Hypocalcemia.  Check Ca level.       Orders Placed This Encounter   Procedures    Basic Metabolic Panel     Order Specific Question:   Release to patient     Answer:   Routine Release [[id number]]       Current Outpatient Medications   Medication Sig Dispense Refill    BD Pen Needle Micro U/F 32G X 6 MM misc USE TO INJECT ONCE DAILY 100 each 0    fluticasone (FLONASE) 50 MCG/ACT nasal spray SPRAY TWICE IN EACH NOSTRIL TOWARDS THE OUTSIDE OF THE NOSE ONCE DAILY AND DON'T INHALE      HYDROcodone-acetaminophen (NORCO) 5-325 MG per tablet TAKE 1 TABLET AS NEEDE ORALLY EVERY 6 HRS 5 DAYS      levocetirizine (XYZAL) 5 MG tablet       Liraglutide (Saxenda) 18 MG/3ML injection pen Inject 3 mg under the skin into the appropriate area as directed Take As Directed. 15 mL 5    multivitamin (THERAGRAN) tablet tablet Daily.      multivitamin with minerals tablet tablet Take 2 tablets by mouth Daily.      olmesartan (Benicar) 40 MG tablet Take 1 tablet by mouth Daily. 90 tablet 3    pantoprazole (PROTONIX) 40 MG EC tablet TAKE ONE ORALLY DAILY 30 MINUTES TO 2 HOURS BEFORE DINNER      amLODIPine (NORVASC) 2.5 MG tablet Take 1 tablet by mouth Daily. 90 tablet 3    Calcium Carbonate-Vit D-Min  (Caltrate 600+D Plus Minerals) 600-800 MG-UNIT chewable tablet 1 BID 60 tablet      No current facility-administered medications for this visit.       Shruthi Dockery had no medications administered during this visit.    Return in about 3 months (around 12/25/2023).    There are no Patient Instructions on file for this visit.

## 2023-09-26 LAB
BUN SERPL-MCNC: 16 MG/DL (ref 6–24)
BUN/CREAT SERPL: 15 (ref 9–23)
CALCIUM SERPL-MCNC: 8.9 MG/DL (ref 8.7–10.2)
CHLORIDE SERPL-SCNC: 104 MMOL/L (ref 96–106)
CO2 SERPL-SCNC: 23 MMOL/L (ref 20–29)
CREAT SERPL-MCNC: 1.06 MG/DL (ref 0.57–1)
EGFRCR SERPLBLD CKD-EPI 2021: 64 ML/MIN/1.73
GLUCOSE SERPL-MCNC: 83 MG/DL (ref 70–99)
POTASSIUM SERPL-SCNC: 4.9 MMOL/L (ref 3.5–5.2)
SODIUM SERPL-SCNC: 141 MMOL/L (ref 134–144)

## 2023-11-01 ENCOUNTER — TELEPHONE (OUTPATIENT)
Dept: FAMILY MEDICINE CLINIC | Facility: CLINIC | Age: 48
End: 2023-11-01

## 2023-11-01 RX ORDER — ONDANSETRON 4 MG/1
4 TABLET, ORALLY DISINTEGRATING ORAL EVERY 8 HOURS PRN
Qty: 30 TABLET | Refills: 0 | Status: SHIPPED | OUTPATIENT
Start: 2023-11-01

## 2023-11-01 NOTE — TELEPHONE ENCOUNTER
Caller: Shruthi Dockery    Relationship: Self    Best call back number: [phone]    What medication are you requesting: SOMETHING FOR DIARRHEA, NAUSEA, DULL PAIN IN THE UPPER PART OF HER STOMACH     What are your current symptoms: DIARRHEA, DULL PAIN UPPER ABDOMIN, SOME NAUSEA    How long have you been experiencing symptoms: 1 WEEK     Have you had these symptoms before:    [] Yes  [x] No    Have you been treated for these symptoms before:   [] Yes  [x] No    If a prescription is needed, what is your preferred pharmacy and phone number: [id number] - [place], [place] - [address] - [phone] [facility] [phone] [facility]     Additional notes:

## 2023-12-27 ENCOUNTER — OFFICE VISIT (OUTPATIENT)
Dept: FAMILY MEDICINE CLINIC | Facility: CLINIC | Age: 48
End: 2023-12-27
Payer: COMMERCIAL

## 2023-12-27 VITALS
RESPIRATION RATE: 20 BRPM | DIASTOLIC BLOOD PRESSURE: 92 MMHG | SYSTOLIC BLOOD PRESSURE: 148 MMHG | WEIGHT: 250 LBS | OXYGEN SATURATION: 98 % | TEMPERATURE: 97.7 F | HEIGHT: 65 IN | HEART RATE: 88 BPM | BODY MASS INDEX: 41.65 KG/M2

## 2023-12-27 DIAGNOSIS — J20.8 ACUTE BRONCHITIS DUE TO OTHER SPECIFIED ORGANISMS: ICD-10-CM

## 2023-12-27 DIAGNOSIS — I10 BENIGN ESSENTIAL HYPERTENSION: Primary | ICD-10-CM

## 2023-12-27 PROCEDURE — 99214 OFFICE O/P EST MOD 30 MIN: CPT | Performed by: FAMILY MEDICINE

## 2023-12-27 RX ORDER — AMLODIPINE BESYLATE 5 MG/1
5 TABLET ORAL DAILY
Qty: 90 TABLET | Refills: 1 | Status: SHIPPED | OUTPATIENT
Start: 2023-12-27 | End: 2023-12-27 | Stop reason: SDUPTHER

## 2023-12-27 RX ORDER — AMLODIPINE BESYLATE 5 MG/1
5 TABLET ORAL DAILY
Qty: 90 TABLET | Refills: 1 | Status: SHIPPED | OUTPATIENT
Start: 2023-12-27

## 2023-12-27 RX ORDER — AZITHROMYCIN 250 MG/1
TABLET, FILM COATED ORAL
Qty: 6 TABLET | Refills: 0 | Status: SHIPPED | OUTPATIENT
Start: 2023-12-27 | End: 2023-12-27 | Stop reason: SDUPTHER

## 2023-12-27 RX ORDER — AZITHROMYCIN 250 MG/1
TABLET, FILM COATED ORAL
Qty: 6 TABLET | Refills: 0 | Status: SHIPPED | OUTPATIENT
Start: 2023-12-27 | End: 2024-01-01

## 2023-12-27 NOTE — PROGRESS NOTES
Chief Complaint   Patient presents with    Cough     For 1 week    Hypertension       Wale Dockery is a 48 y.o. female.     Cough  Associated symptoms include rhinorrhea. Pertinent negatives include no fever.   Hypertension       C/o 16 days of illness.  Started with fever.  Now with cough.  No better.    F/U Htn.  Doing well with meds.   The following portions of the patient's history were reviewed and updated as appropriate: allergies, current medications, past family history, past medical history, past social history, past surgical history and problem list.    Review of Systems   Constitutional:  Negative for fatigue and fever.   HENT:  Positive for rhinorrhea.    Respiratory:  Positive for cough.        Patient Active Problem List   Diagnosis    Chronic fatigue    Edema    TERRI (obstructive sleep apnea)    Obesity, Class II, BMI 35-39.9    Allergic rhinitis    Anemia    Benign essential hypertension    BPPV (benign paroxysmal positional vertigo)    Hyperglycemia    Wheezing    Palpitations    S/P laparoscopic sleeve gastrectomy    Primary osteoarthritis involving multiple joints    Malabsorption due to intolerance, not elsewhere classified    Primary insomnia    Macromastia    Encounter for annual physical exam    Cervical strain    Thyroid mass    Swelling of thyroid gland    Hypocalcemia    Strain of left trapezius muscle    Acute bronchitis due to other specified organisms       Allergies   Allergen Reactions    Lisinopril-Hydrochlorothiazide Cough    Penicillins Hives    Skelaxin [Metaxalone] Hives    Sulfa Antibiotics Hives         Current Outpatient Medications:     amLODIPine (NORVASC) 5 MG tablet, Take 1 tablet by mouth Daily., Disp: 90 tablet, Rfl: 1    BD Pen Needle Micro U/F 32G X 6 MM misc, USE TO INJECT ONCE DAILY, Disp: 100 each, Rfl: 0    Calcium Carbonate-Vit D-Min (Caltrate 600+D Plus Minerals) 600-800 MG-UNIT chewable tablet, 1 BID, Disp: 60 tablet, Rfl:     fluticasone  (FLONASE) 50 MCG/ACT nasal spray, SPRAY TWICE IN EACH NOSTRIL TOWARDS THE OUTSIDE OF THE NOSE ONCE DAILY AND DON'T INHALE, Disp: , Rfl:     levocetirizine (XYZAL) 5 MG tablet, , Disp: , Rfl:     multivitamin (THERAGRAN) tablet tablet, Daily., Disp: , Rfl:     multivitamin with minerals tablet tablet, Take 2 tablets by mouth Daily., Disp: , Rfl:     olmesartan (Benicar) 40 MG tablet, Take 1 tablet by mouth Daily., Disp: 90 tablet, Rfl: 3    pantoprazole (PROTONIX) 40 MG EC tablet, TAKE ONE ORALLY DAILY 30 MINUTES TO 2 HOURS BEFORE DINNER, Disp: , Rfl:     azithromycin (Zithromax Z-Silas) 250 MG tablet, Take 2 tablets the first day, then 1 tablet daily for 4 days., Disp: 6 tablet, Rfl: 0    HYDROcodone-acetaminophen (NORCO) 5-325 MG per tablet, TAKE 1 TABLET AS NEEDE ORALLY EVERY 6 HRS 5 DAYS (Patient not taking: Reported on 12/27/2023), Disp: , Rfl:     Liraglutide (Saxenda) 18 MG/3ML injection pen, Inject 3 mg under the skin into the appropriate area as directed Take As Directed. (Patient not taking: Reported on 12/27/2023), Disp: 15 mL, Rfl: 5    ondansetron ODT (ZOFRAN-ODT) 4 MG disintegrating tablet, Place 1 tablet on the tongue Every 8 (Eight) Hours As Needed for Nausea or Vomiting. (Patient not taking: Reported on 12/27/2023), Disp: 30 tablet, Rfl: 0    Past Medical History:   Diagnosis Date    Abnormal uterine bleeding     Acute foot pain, left     Allergic rhinitis     Anemia     Ankle joint pain     Bacterial vaginosis     Benign essential hypertension     BMI 50.0-59.9, adult     BPPV (benign paroxysmal positional vertigo)     Daytime hypersomnolence     Edema     Hemorrhoids     History of blood transfusion     History of chickenpox     Hx of bariatric surgery     Hyperglycemia     Hypocalcemia     Immunization deficiency 9/28/2022    Irregular menstrual cycle     Irritability and anger     Macromastia     Menorrhagia     Obesity     Obstructive sleep apnea syndrome     Optic nerve swelling     Palpitations      Sleep difficulties     Snoring     Strain of right trapezius muscle 9/28/2022    Swelling     Swelling of both ankles     Trapezius muscle spasm     Weight gain     Wheezing        Past Surgical History:   Procedure Laterality Date    BARIATRIC SURGERY      BUNIONECTOMY Left 11/30/2021    left foot    GASTRIC SLEEVE LAPAROSCOPIC N/A 04/18/2018    Procedure: GASTRIC SLEEVE LAPAROSCOPIC;  Surgeon: Christ Sunshine Jr., MD;  Location: [facility] OR [facility] [place];  Service: Bariatric    TUBAL ABDOMINAL LIGATION      VAGINAL HYSTERECTOMY         Family History   Problem Relation Age of Onset    Obesity Mother     Hypertension Mother     Stroke Mother     Coronary artery disease Mother     Sleep apnea Mother     Hypertension Sister     Hypertension Maternal Grandfather     Malig Hyperthermia Neg Hx        Social History     Tobacco Use    Smoking status: Never    Smokeless tobacco: Never   Substance Use Topics    Alcohol use: No            Objective     Vitals:    12/27/23 1452   BP: 148/92   Pulse: 88   Resp: 20   Temp: 97.7 °F (36.5 °C)   SpO2: 98%     Body mass index is 41.6 kg/m².    Physical Exam  Vitals reviewed.   Constitutional:       Appearance: She is well-developed. She is not diaphoretic.   HENT:      Head: Normocephalic and atraumatic.   Eyes:      General: No scleral icterus.     Pupils: Pupils are equal, round, and reactive to light.   Neck:      Thyroid: No thyromegaly.   Cardiovascular:      Rate and Rhythm: Normal rate and regular rhythm.      Heart sounds: No murmur heard.     No friction rub. No gallop.   Pulmonary:      Effort: Pulmonary effort is normal. No respiratory distress.      Breath sounds: No wheezing or rales.   Chest:      Chest wall: No tenderness.   Abdominal:      General: Bowel sounds are normal. There is no distension.      Palpations: Abdomen is soft.      Tenderness: There is no abdominal tenderness.   Musculoskeletal:         General: No deformity. Normal range of motion.   Lymphadenopathy:       Cervical: No cervical adenopathy.   Skin:     General: Skin is warm and dry.      Findings: No rash.   Neurological:      Cranial Nerves: No cranial nerve deficit.      Motor: No abnormal muscle tone.         Lab Results   Component Value Date    GLUCOSE 83 09/25/2023    BUN 16 09/25/2023    CREATININE 1.06 (H) 09/25/2023    EGFRIFNONA 68 01/26/2022    EGFRIFAFRI 79 01/26/2022    BCR 15 09/25/2023    K 4.9 09/25/2023    CO2 23 09/25/2023    CALCIUM 8.9 09/25/2023    PROTENTOTREF 6.5 03/28/2023    ALBUMIN 4.5 03/28/2023    LABIL2 2.3 03/28/2023    AST 12 03/28/2023    ALT 13 03/28/2023       WBC   Date Value Ref Range Status   03/28/2023 7.83 3.40 - 10.80 10*3/mm3 Final     RBC   Date Value Ref Range Status   03/28/2023 4.51 3.77 - 5.28 10*6/mm3 Final     Hemoglobin   Date Value Ref Range Status   03/28/2023 13.0 12.0 - 15.9 g/dL Final   07/12/2018 12.3 11.9 - 15.5 g/dL Final     Hematocrit   Date Value Ref Range Status   03/28/2023 39.1 34.0 - 46.6 % Final   07/12/2018 36.2 35.6 - 45.5 % Final     MCV   Date Value Ref Range Status   03/28/2023 86.7 79.0 - 97.0 fL Final   07/12/2018 83.8 80.5 - 98.2 fL Final     MCH   Date Value Ref Range Status   03/28/2023 28.8 26.6 - 33.0 pg Final   07/12/2018 28.5 26.9 - 32.0 pg Final     MCHC   Date Value Ref Range Status   03/28/2023 33.2 31.5 - 35.7 g/dL Final   07/12/2018 34.0 32.4 - 36.3 g/dL Final     RDW   Date Value Ref Range Status   03/28/2023 14.0 12.3 - 15.4 % Final   07/12/2018 15.3 (H) 11.7 - 13.0 % Final     RDW-SD   Date Value Ref Range Status   07/12/2018 47.3 37.0 - 54.0 fl Final     MPV   Date Value Ref Range Status   07/12/2018 10.6 6.0 - 12.0 fL Final     Platelets   Date Value Ref Range Status   03/28/2023 398 140 - 450 10*3/mm3 Final   07/12/2018 371 140 - 500 10*3/mm3 Final     Neutrophil Rel %   Date Value Ref Range Status   03/28/2023 45.7 42.7 - 76.0 % Final     Neutrophil %   Date Value Ref Range Status   07/12/2018 47.4 42.7 - 76.0 % Final      Lymphocyte Rel %   Date Value Ref Range Status   03/28/2023 47.3 (H) 19.6 - 45.3 % Final     Lymphocyte %   Date Value Ref Range Status   07/12/2018 45.8 (H) 19.6 - 45.3 % Final     Monocyte Rel %   Date Value Ref Range Status   03/28/2023 4.5 (L) 5.0 - 12.0 % Final     Monocyte %   Date Value Ref Range Status   07/12/2018 4.4 (L) 5.0 - 12.0 % Final     Eosinophil Rel %   Date Value Ref Range Status   03/28/2023 1.5 0.3 - 6.2 % Final     Eosinophil %   Date Value Ref Range Status   07/12/2018 1.9 0.3 - 6.2 % Final     Basophil Rel %   Date Value Ref Range Status   03/28/2023 0.9 0.0 - 1.5 % Final     Basophil %   Date Value Ref Range Status   07/12/2018 0.5 0.0 - 1.5 % Final     Immature Grans %   Date Value Ref Range Status   07/12/2018 0.1 0.0 - 0.5 % Final     Neutrophils Absolute   Date Value Ref Range Status   04/17/2023 4.70 1.70 - 6.00 x10(3)/ul Final     Lymphocytes Absolute   Date Value Ref Range Status   03/28/2023 3.70 (H) 0.70 - 3.10 10*3/mm3 Final     Lymphocytes, Absolute   Date Value Ref Range Status   07/12/2018 3.87 0.90 - 4.80 10*3/mm3 Final     Monocytes Absolute   Date Value Ref Range Status   03/28/2023 0.35 0.10 - 0.90 10*3/mm3 Final     Monocytes, Absolute   Date Value Ref Range Status   07/12/2018 0.37 0.20 - 1.20 10*3/mm3 Final     Eosinophils Absolute   Date Value Ref Range Status   04/17/2023 0.1 0.0 - 0.6 x10(3)/ul Final     Basophils Absolute   Date Value Ref Range Status   04/17/2023 0.1 0.0 - 0.3 x10(3)/ul Final     Immature Grans, Absolute   Date Value Ref Range Status   07/12/2018 0.01 0.00 - 0.03 10*3/mm3 Final     nRBC   Date Value Ref Range Status   03/28/2023 0.0 0.0 - 0.2 /100 WBC Final       Lab Results   Component Value Date    HGBA1C 5.10 03/28/2023       Lab Results   Component Value Date    ODFAVNKK48 822 03/28/2023       TSH   Date Value Ref Range Status   03/28/2023 0.555 0.270 - 4.200 uIU/mL Final   12/19/2017 0.915 0.270 - 4.200 mIU/mL Final       Lab Results    Component Value Date    CHOL 150 12/19/2017     Lab Results   Component Value Date    TRIG 57 03/28/2023     Lab Results   Component Value Date    HDL 51 03/28/2023     Lab Results   Component Value Date    LDL 92 03/28/2023     Lab Results   Component Value Date    VLDL 12 03/28/2023     Lab Results   Component Value Date    LDLHDL 2.63 12/19/2017         Procedures    Assessment & Plan   Problems Addressed this Visit       Benign essential hypertension - Primary    Relevant Medications    amLODIPine (NORVASC) 5 MG tablet    Acute bronchitis due to other specified organisms    Relevant Medications    azithromycin (Zithromax Z-Silas) 250 MG tablet     Diagnoses         Codes Comments    Benign essential hypertension    -  Primary ICD-10-CM: I10  ICD-9-CM: 401.1     Acute bronchitis due to other specified organisms     ICD-10-CM: J20.8  ICD-9-CM: 466.0         HTN.  Uncontrolled. Chronic.   Increase amlo to 5mg a day.   Acute bronchitis.  Viral vs bacterial.  Zpack rx.    No orders of the defined types were placed in this encounter.      Current Outpatient Medications   Medication Sig Dispense Refill    amLODIPine (NORVASC) 5 MG tablet Take 1 tablet by mouth Daily. 90 tablet 1    BD Pen Needle Micro U/F 32G X 6 MM misc USE TO INJECT ONCE DAILY 100 each 0    Calcium Carbonate-Vit D-Min (Caltrate 600+D Plus Minerals) 600-800 MG-UNIT chewable tablet 1 BID 60 tablet     fluticasone (FLONASE) 50 MCG/ACT nasal spray SPRAY TWICE IN EACH NOSTRIL TOWARDS THE OUTSIDE OF THE NOSE ONCE DAILY AND DON'T INHALE      levocetirizine (XYZAL) 5 MG tablet       multivitamin (THERAGRAN) tablet tablet Daily.      multivitamin with minerals tablet tablet Take 2 tablets by mouth Daily.      olmesartan (Benicar) 40 MG tablet Take 1 tablet by mouth Daily. 90 tablet 3    pantoprazole (PROTONIX) 40 MG EC tablet TAKE ONE ORALLY DAILY 30 MINUTES TO 2 HOURS BEFORE DINNER      azithromycin (Zithromax Z-Silas) 250 MG tablet Take 2 tablets the first  day, then 1 tablet daily for 4 days. 6 tablet 0    HYDROcodone-acetaminophen (NORCO) 5-325 MG per tablet TAKE 1 TABLET AS NEEDE ORALLY EVERY 6 HRS 5 DAYS (Patient not taking: Reported on 12/27/2023)      Liraglutide (Saxenda) 18 MG/3ML injection pen Inject 3 mg under the skin into the appropriate area as directed Take As Directed. (Patient not taking: Reported on 12/27/2023) 15 mL 5    ondansetron ODT (ZOFRAN-ODT) 4 MG disintegrating tablet Place 1 tablet on the tongue Every 8 (Eight) Hours As Needed for Nausea or Vomiting. (Patient not taking: Reported on 12/27/2023) 30 tablet 0     No current facility-administered medications for this visit.       Shruthi Dockery had no medications administered during this visit.    Return in about 4 months (around 4/27/2024).    There are no Patient Instructions on file for this visit.

## 2024-01-16 ENCOUNTER — OFFICE VISIT (OUTPATIENT)
Dept: BARIATRICS/WEIGHT MGMT | Facility: CLINIC | Age: 49
End: 2024-01-16
Payer: COMMERCIAL

## 2024-01-16 VITALS
BODY MASS INDEX: 41.99 KG/M2 | WEIGHT: 252 LBS | SYSTOLIC BLOOD PRESSURE: 140 MMHG | HEIGHT: 65 IN | HEART RATE: 76 BPM | TEMPERATURE: 98.4 F | DIASTOLIC BLOOD PRESSURE: 88 MMHG

## 2024-01-16 DIAGNOSIS — E66.01 CLASS 3 SEVERE OBESITY WITH SERIOUS COMORBIDITY AND BODY MASS INDEX (BMI) OF 40.0 TO 44.9 IN ADULT, UNSPECIFIED OBESITY TYPE: Primary | ICD-10-CM

## 2024-01-16 DIAGNOSIS — I10 BENIGN ESSENTIAL HYPERTENSION: ICD-10-CM

## 2024-01-16 DIAGNOSIS — Z98.84 S/P LAPAROSCOPIC SLEEVE GASTRECTOMY: ICD-10-CM

## 2024-01-16 DIAGNOSIS — G47.33 OSA (OBSTRUCTIVE SLEEP APNEA): ICD-10-CM

## 2024-01-16 PROBLEM — E66.812 OBESITY, CLASS II, BMI 35-39.9: Status: RESOLVED | Noted: 2018-10-17 | Resolved: 2024-01-16

## 2024-01-16 PROBLEM — E66.813 CLASS 3 SEVERE OBESITY WITH SERIOUS COMORBIDITY AND BODY MASS INDEX (BMI) OF 40.0 TO 44.9 IN ADULT: Status: ACTIVE | Noted: 2018-04-18

## 2024-01-16 PROBLEM — E66.9 OBESITY, CLASS II, BMI 35-39.9: Status: RESOLVED | Noted: 2018-10-17 | Resolved: 2024-01-16

## 2024-01-16 PROCEDURE — 99214 OFFICE O/P EST MOD 30 MIN: CPT | Performed by: NURSE PRACTITIONER

## 2024-01-16 RX ORDER — HYDROCHLOROTHIAZIDE 25 MG/1
25 TABLET ORAL DAILY
COMMUNITY
Start: 2024-01-02 | End: 2025-01-01

## 2024-01-16 RX ORDER — TOPIRAMATE 25 MG/1
TABLET ORAL
Qty: 166 TABLET | Refills: 0 | Status: SHIPPED | OUTPATIENT
Start: 2024-01-16 | End: 2024-04-15

## 2024-01-16 NOTE — PROGRESS NOTES
[facility] BARIATRIC SURGERY  [address] [facility] [address]  [facility] [id number] [phone]  [address] [facility] [address]  [facility] [id number] [phone]  Dept: [phone]  1/16/2024      Shrutih Dockery.  [id number]  [id number]  1975  female      Chief Complaint   Patient presents with    Follow-up     Fup sleeve       BH Post-Op Bariatric Surgery:   Shruthi Dockery is status post Laparoscopic Sleeve procedure, performed on 4/18/18 who was previously on Saxenda and had lost and maintained more than 16lb loss since starting last December but her insurance denied her PA and PA appeal despite her success with the medication and currently the medication is out of stock.     HPI:       Today's weight is 114 kg (252 lb) pounds, today's BMI is Body mass index is 41.93 kg/m²., she has a gain of 18 pounds since the last visit and her weight loss since surgery is 48 pounds. The patient reports a decreased portion size and loss of appetite.    Shruthi Dockery denies nausea, vomiting, reflux and reports that she is undergoing workup with cardiology due to anxiety and palpitations but workup this far has been benign. Patient would like to discuss medical weight loss vs possible revision to RNY gastric bypass for her weight.      Diet and Exercise: Diet history reviewed and discussed with the patient. Weight loss/gains to date discussed with the patient. The patient states they are eating 60 grams of protein per day. She reports eating 3 meals per day, a typical portion size of 1/2 cup, eating 1-2 snacks per day, drinking 5-6 or more 8-oz. glasses of water per day, no carbonated beverage consumption and exercising regularly.     Supplements: OTC MTV with iron and calcium.     Review of Systems   Constitutional:  Positive for appetite change. Negative for fatigue and unexpected weight change.   HENT: Negative.     Eyes: Negative.    Respiratory: Negative.     Cardiovascular:  Negative.  Negative for leg swelling.   Gastrointestinal:  Negative for abdominal distention, abdominal pain, constipation, diarrhea, nausea and vomiting.   Genitourinary:  Negative for difficulty urinating, frequency and urgency.   Musculoskeletal:  Negative for back pain.   Skin: Negative.    Psychiatric/Behavioral: Negative.     All other systems reviewed and are negative.      Patient Active Problem List   Diagnosis    Chronic fatigue    Edema    TERRI (obstructive sleep apnea)    Class 3 severe obesity with serious comorbidity and body mass index (BMI) of 40.0 to 44.9 in adult    Allergic rhinitis    Anemia    Benign essential hypertension    BPPV (benign paroxysmal positional vertigo)    Hyperglycemia    Wheezing    Palpitations    S/P laparoscopic sleeve gastrectomy    Primary osteoarthritis involving multiple joints    Malabsorption due to intolerance, not elsewhere classified    Primary insomnia    Macromastia    Encounter for annual physical exam    Cervical strain    Thyroid mass    Swelling of thyroid gland    Hypocalcemia    Strain of left trapezius muscle    Acute bronchitis due to other specified organisms       Past Medical History:   Diagnosis Date    Abnormal uterine bleeding     Acute foot pain, left     Allergic rhinitis     Anemia     Ankle joint pain     Bacterial vaginosis     Benign essential hypertension     BMI 50.0-59.9, adult     BPPV (benign paroxysmal positional vertigo)     Daytime hypersomnolence     Edema     Hemorrhoids     History of blood transfusion     History of chickenpox     Hx of bariatric surgery     Hyperglycemia     Hypocalcemia     Immunization deficiency 9/28/2022    Irregular menstrual cycle     Irritability and anger     Macromastia     Menorrhagia     Obesity     Obstructive sleep apnea syndrome     Optic nerve swelling     Palpitations     Sleep difficulties     Snoring     Strain of right trapezius muscle 9/28/2022    Swelling     Swelling of both ankles      Trapezius muscle spasm     Weight gain     Wheezing        The following portions of the patient's history were reviewed and updated as appropriate: allergies, current medications, past family history, past medical history, past social history, past surgical history, and problem list.    Vitals:    01/16/24 0828   BP: 140/88   Pulse: 76   Temp: 98.4 °F (36.9 °C)       Physical Exam  Vitals and nursing note reviewed.   Constitutional:       Appearance: She is well-developed.   Neck:      Thyroid: No thyromegaly.   Cardiovascular:      Rate and Rhythm: Normal rate.   Pulmonary:      Effort: Pulmonary effort is normal. No respiratory distress.   Abdominal:      Palpations: Abdomen is soft.   Musculoskeletal:         General: No tenderness.   Skin:     General: Skin is warm and dry.   Neurological:      Mental Status: She is alert.   Psychiatric:         Behavior: Behavior normal.         Assessment:   Post-op, the patient is frustrated with weight regain.     Encounter Diagnoses   Name Primary?    Class 3 severe obesity with serious comorbidity and body mass index (BMI) of 40.0 to 44.9 in adult, unspecified obesity type Yes    S/P laparoscopic sleeve gastrectomy     Benign essential hypertension     TERRI (obstructive sleep apnea)        Plan:   Plan to start topiramate to help with satiety. We discussed doing, common AE, and risk of kidney stones. We did discuss that she is a candidate for revision to RNY and did discuss how having this would affect her portion sizes, blood sugar control but also which medications she would be able to take and risks associated with malabsorption. She did well with taking a GLP-1 drug and she would like to consider this treatment option in the future but we did discuss lack of stock and lack of coverage for injectable medications that are currently in stock. We may revisit these as option in the future if stock and access improves.    Encouraged patient to be sure to get plenty of lean  protein per day through small frequent meals all with a protein source.   Activity restrictions: none.   Recommended patient be sure to get at least 70 grams of protein per day by eating small, frequent meals all with high lean protein choices. Be sure to limit/cut back on daily carbohydrate intake. Discussed with the patient the recommended amount of water per day to intake- half of body weight in ounces. Reviewed vitamin requirements. Be sure to do routine exercise, 150 minutes per week minimum, including both cardio and strength training.     Instructions / Recommendations: dietary counseling recommended, recommended a daily protein intake of  grams, vitamin supplement(s) recommended, recommended exercising at least 150 minutes per week, behavior modifications recommended and instructed to call the office for concerns, questions, or problems.     The patient was instructed to follow up in 3 months .     Total time spent during this encounter today was 40 minutes

## 2024-01-20 ENCOUNTER — PATIENT MESSAGE (OUTPATIENT)
Dept: BARIATRICS/WEIGHT MGMT | Facility: CLINIC | Age: 49
End: 2024-01-20
Payer: COMMERCIAL

## 2024-01-24 NOTE — TELEPHONE ENCOUNTER
Called pharmacy and spoke to Sedrick. He stated the computer calculated the supply wrong. He fixed it and patients prescription of topiramate did through for 90 day supply. Called and notified patient to let her know.

## 2024-04-16 ENCOUNTER — OFFICE VISIT (OUTPATIENT)
Dept: BARIATRICS/WEIGHT MGMT | Facility: CLINIC | Age: 49
End: 2024-04-16
Payer: COMMERCIAL

## 2024-04-16 VITALS
TEMPERATURE: 97.5 F | HEIGHT: 65 IN | DIASTOLIC BLOOD PRESSURE: 86 MMHG | HEART RATE: 82 BPM | BODY MASS INDEX: 43.45 KG/M2 | SYSTOLIC BLOOD PRESSURE: 159 MMHG | WEIGHT: 260.8 LBS

## 2024-04-16 DIAGNOSIS — E66.01 CLASS 3 SEVERE OBESITY WITH SERIOUS COMORBIDITY AND BODY MASS INDEX (BMI) OF 40.0 TO 44.9 IN ADULT, UNSPECIFIED OBESITY TYPE: Primary | ICD-10-CM

## 2024-04-16 DIAGNOSIS — Z98.84 S/P LAPAROSCOPIC SLEEVE GASTRECTOMY: ICD-10-CM

## 2024-04-16 DIAGNOSIS — I10 BENIGN ESSENTIAL HYPERTENSION: ICD-10-CM

## 2024-04-16 DIAGNOSIS — G47.33 OSA (OBSTRUCTIVE SLEEP APNEA): ICD-10-CM

## 2024-04-16 PROCEDURE — 99214 OFFICE O/P EST MOD 30 MIN: CPT | Performed by: NURSE PRACTITIONER

## 2024-04-16 RX ORDER — PSEUDOEPHEDRINE HCL 30 MG
100 TABLET ORAL AS NEEDED
COMMUNITY
Start: 2024-03-01

## 2024-04-16 RX ORDER — GABAPENTIN 300 MG/1
300 CAPSULE ORAL DAILY
COMMUNITY
Start: 2024-03-01

## 2024-04-16 RX ORDER — SEMAGLUTIDE 0.25 MG/.5ML
0.25 INJECTION, SOLUTION SUBCUTANEOUS WEEKLY
Qty: 2 ML | Refills: 0 | Status: SHIPPED | OUTPATIENT
Start: 2024-04-16

## 2024-04-16 RX ORDER — SEMAGLUTIDE 1 MG/.5ML
1 INJECTION, SOLUTION SUBCUTANEOUS WEEKLY
Qty: 2 ML | Refills: 0 | Status: SHIPPED | OUTPATIENT
Start: 2024-04-16 | End: 2024-05-14

## 2024-04-16 RX ORDER — ACETAMINOPHEN 500 MG
500 TABLET ORAL EVERY 4 HOURS PRN
COMMUNITY
Start: 2024-03-01

## 2024-04-16 RX ORDER — SEMAGLUTIDE 0.5 MG/.5ML
0.5 INJECTION, SOLUTION SUBCUTANEOUS WEEKLY
Qty: 2 ML | Refills: 0 | Status: SHIPPED | OUTPATIENT
Start: 2024-04-16 | End: 2024-05-17

## 2024-04-16 RX ORDER — MELOXICAM 15 MG/1
15 TABLET ORAL DAILY
COMMUNITY
Start: 2024-03-01 | End: 2024-04-16

## 2024-04-16 NOTE — PROGRESS NOTES
[facility] BARIATRIC SURGERY  [address]  [facility] [id number] [phone]  [address]  [facility] [place] [id number] [phone]  Dept: [phone]  4/16/2024      Shruthi Dockery.  [id number]  [id number]  1975  female      Chief Complaint   Patient presents with    Follow-up     Follow up sleeve       BH Post-Op Bariatric Surgery:   Shruthi Dockery is status post Laparoscopic Sleeve procedure, performed on 4/18/18     HPI:   She was taking topiramate but discontinued due to side effects. She did tolerate and do well with saxenda until she wasn't able to find due to stock.    Today's weight is 118 kg (260 lb 12.8 oz) pounds, today's BMI is Body mass index is 43.4 kg/m²., she has a gain of 8 pounds since the last visit and her weight loss since surgery is 40 pounds. The patient reports a decreased portion size and loss of appetite.    Shruthi Dockery denies nausea, vomiting, reflux     Diet and Exercise: Diet history reviewed and discussed with the patient. Weight loss/gains to date discussed with the patient. The patient states they are eating 40-70 grams of protein per day. She reports eating 3 meals per day, a typical portion size of 1/2 cup, eating 1 snacks per day, drinking 5-6 or more 8-oz. glasses of water per day, no carbonated beverage consumption and exercising regularly.     Supplements: OTC MTV with iron.     Review of Systems   Constitutional:  Positive for appetite change. Negative for fatigue and unexpected weight change.   HENT: Negative.     Eyes: Negative.    Respiratory: Negative.     Cardiovascular: Negative.  Negative for leg swelling.   Gastrointestinal:  Negative for abdominal distention, abdominal pain, constipation, diarrhea, nausea and vomiting.   Genitourinary:  Negative for difficulty urinating, frequency and urgency.   Musculoskeletal:  Negative for back pain.   Skin: Negative.    Psychiatric/Behavioral:  Negative.     All other systems reviewed and are negative.      Patient Active Problem List   Diagnosis    Chronic fatigue    Edema    TERRI (obstructive sleep apnea)    Class 3 severe obesity with serious comorbidity and body mass index (BMI) of 40.0 to 44.9 in adult    Allergic rhinitis    Anemia    Benign essential hypertension    BPPV (benign paroxysmal positional vertigo)    Hyperglycemia    Wheezing    Palpitations    S/P laparoscopic sleeve gastrectomy    Primary osteoarthritis involving multiple joints    Malabsorption due to intolerance, not elsewhere classified    Primary insomnia    Macromastia    Encounter for annual physical exam    Cervical strain    Thyroid mass    Swelling of thyroid gland    Hypocalcemia    Strain of left trapezius muscle    Acute bronchitis due to other specified organisms       Past Medical History:   Diagnosis Date    Abnormal uterine bleeding     Acute foot pain, left     Allergic rhinitis     Anemia     Ankle joint pain     Bacterial vaginosis     Benign essential hypertension     BMI 50.0-59.9, adult     BPPV (benign paroxysmal positional vertigo)     Daytime hypersomnolence     Edema     Hemorrhoids     History of blood transfusion     History of chickenpox     Hx of bariatric surgery     Hyperglycemia     Hypocalcemia     Immunization deficiency 9/28/2022    Irregular menstrual cycle     Irritability and anger     Macromastia     Menorrhagia     Obesity     Obstructive sleep apnea syndrome     Optic nerve swelling     Palpitations     Sleep difficulties     Snoring     Strain of right trapezius muscle 9/28/2022    Swelling     Swelling of both ankles     Trapezius muscle spasm     Weight gain     Wheezing        The following portions of the patient's history were reviewed and updated as appropriate: allergies, current medications, past family history, past medical history, past social history, past surgical history, and problem list.    Vitals:    04/16/24 0832   BP: 159/86    Pulse: 82   Temp: 97.5 °F (36.4 °C)       Physical Exam  Vitals and nursing note reviewed.   Constitutional:       Appearance: She is well-developed.   Neck:      Thyroid: No thyromegaly.   Cardiovascular:      Rate and Rhythm: Normal rate.   Pulmonary:      Effort: Pulmonary effort is normal. No respiratory distress.   Abdominal:      Palpations: Abdomen is soft.   Musculoskeletal:         General: No tenderness.   Skin:     General: Skin is warm and dry.   Neurological:      Mental Status: She is alert.   Psychiatric:         Behavior: Behavior normal.         Assessment:   Post-op, the patient is frustrated with her weight gain. She is doing well with her diet but has not been as active as she used to be.     Encounter Diagnoses   Name Primary?    Class 3 severe obesity with serious comorbidity and body mass index (BMI) of 40.0 to 44.9 in adult, unspecified obesity type Yes    Benign essential hypertension     S/P laparoscopic sleeve gastrectomy     TERRI (obstructive sleep apnea)        Plan:   We did discuss GLP-1 therapy, specifically semaglutide. We discussed dosing, administration including injection site preparation with alcohol, titration, common side effects including nausea, vomiting, constipation, diarrhea, hypoglycemia, injection site reaction, headache, and abdominal pain. We discussed contraindications including pregnancy. Patient denies history or family history of MEN2 or thyroid cancer, or history of pancreatitis. her does not take insulin or a sulfonylurea. We did discuss remote chance of renal impairment as it was associated with GI symptoms in trials.     Patient will start weekly Wegovy at the 0.25mg dose subcutaneously. Shruthi HEWITT Overall verbalized understanding that her will take one dose weekly and will repeat this dosing for 1 month. We discussed storing future doses in the refrigerator and her was advised to take a dose if missed ASAP if the next scheduled dose is greater than 48 hours  away.     Patient both watched, and was able to repeat demonstration of administration using medication including site preparation, administration, and disposal. her will call and report any adverse effects to our group in the instance that any occur and we will advise at that time.   Encouraged patient to be sure to get plenty of lean protein per day through small frequent meals all with a protein source.   Activity restrictions: none.   Recommended patient be sure to get at least 70 grams of protein per day by eating small, frequent meals all with high lean protein choices. Be sure to limit/cut back on daily carbohydrate intake. Discussed with the patient the recommended amount of water per day to intake- half of body weight in ounces. Reviewed vitamin requirements. Be sure to do routine exercise, 150 minutes per week minimum, including both cardio and strength training.     Instructions / Recommendations: dietary counseling recommended, recommended a daily protein intake of  grams, vitamin supplement(s) recommended, recommended exercising at least 150 minutes per week, behavior modifications recommended and instructed to call the office for concerns, questions, or problems.     The patient was instructed to follow up in 3 months .     Total time spent during this encounter today was 35 minutes

## 2024-04-30 ENCOUNTER — OFFICE VISIT (OUTPATIENT)
Dept: FAMILY MEDICINE CLINIC | Facility: CLINIC | Age: 49
End: 2024-04-30
Payer: COMMERCIAL

## 2024-04-30 VITALS
HEART RATE: 76 BPM | BODY MASS INDEX: 42.45 KG/M2 | DIASTOLIC BLOOD PRESSURE: 90 MMHG | TEMPERATURE: 97.6 F | WEIGHT: 254.8 LBS | OXYGEN SATURATION: 99 % | HEIGHT: 65 IN | RESPIRATION RATE: 17 BRPM | SYSTOLIC BLOOD PRESSURE: 140 MMHG

## 2024-04-30 DIAGNOSIS — I10 BENIGN ESSENTIAL HYPERTENSION: Primary | ICD-10-CM

## 2024-04-30 PROCEDURE — 99214 OFFICE O/P EST MOD 30 MIN: CPT | Performed by: FAMILY MEDICINE

## 2024-04-30 RX ORDER — SPIRONOLACTONE AND HYDROCHLOROTHIAZIDE 25; 25 MG/1; MG/1
1 TABLET ORAL DAILY
Qty: 90 TABLET | Refills: 3 | Status: SHIPPED | OUTPATIENT
Start: 2024-04-30

## 2024-04-30 RX ORDER — SPIRONOLACTONE AND HYDROCHLOROTHIAZIDE 25; 25 MG/1; MG/1
1 TABLET ORAL DAILY
Qty: 90 TABLET | Refills: 3 | Status: SHIPPED | OUTPATIENT
Start: 2024-04-30 | End: 2024-04-30

## 2024-04-30 RX ORDER — AMLODIPINE BESYLATE 5 MG/1
5 TABLET ORAL DAILY
Qty: 90 TABLET | Refills: 3 | Status: SHIPPED | OUTPATIENT
Start: 2024-04-30

## 2024-04-30 NOTE — PROGRESS NOTES
Chief Complaint   Patient presents with    Hypertension       Wale HEWITT Overall is a 48 y.o. female.     Hypertension  Pertinent negatives include no blurred vision, chest pain or shortness of breath.      FU HTN.  No orthostasis.  On meds regularly .   The following portions of the patient's history were reviewed and updated as appropriate: allergies, current medications, past family history, past medical history, past social history, past surgical history and problem list.    Review of Systems   Constitutional:  Negative for appetite change and fatigue.   HENT:  Negative for nosebleeds and sore throat.    Eyes:  Negative for blurred vision and visual disturbance.   Respiratory:  Negative for shortness of breath and wheezing.    Cardiovascular:  Negative for chest pain and leg swelling.   Gastrointestinal:  Negative for abdominal distention and abdominal pain.   Endocrine: Negative for cold intolerance and polyuria.   Genitourinary:  Negative for dysuria and hematuria.   Musculoskeletal:  Negative for arthralgias and myalgias.   Skin:  Negative for color change and rash.   Neurological:  Negative for weakness and confusion.   Psychiatric/Behavioral:  Negative for agitation and depressed mood.        Patient Active Problem List   Diagnosis    Chronic fatigue    Edema    TERRI (obstructive sleep apnea)    Class 3 severe obesity with serious comorbidity and body mass index (BMI) of 40.0 to 44.9 in adult    Allergic rhinitis    Anemia    Benign essential hypertension    BPPV (benign paroxysmal positional vertigo)    Hyperglycemia    Wheezing    Palpitations    S/P laparoscopic sleeve gastrectomy    Primary osteoarthritis involving multiple joints    Malabsorption due to intolerance, not elsewhere classified    Primary insomnia    Macromastia    Encounter for annual physical exam    Cervical strain    Thyroid mass    Swelling of thyroid gland    Hypocalcemia    Strain of left trapezius muscle    Acute  bronchitis due to other specified organisms       Allergies   Allergen Reactions    Lisinopril-Hydrochlorothiazide Cough    Penicillins Hives    Skelaxin [Metaxalone] Hives    Sulfa Antibiotics Hives         Current Outpatient Medications:     acetaminophen (TYLENOL) 500 MG tablet, Take 1 tablet by mouth Every 4 (Four) Hours As Needed., Disp: , Rfl:     amLODIPine (NORVASC) 5 MG tablet, Take 1 tablet by mouth Daily., Disp: 90 tablet, Rfl: 1    Calcium Carbonate-Vit D-Min (Caltrate 600+D Plus Minerals) 600-800 MG-UNIT chewable tablet, 1 BID, Disp: 60 tablet, Rfl:     docusate sodium 100 MG capsule, Take 1 capsule by mouth As Needed., Disp: , Rfl:     fluticasone (FLONASE) 50 MCG/ACT nasal spray, SPRAY TWICE IN EACH NOSTRIL TOWARDS THE OUTSIDE OF THE NOSE ONCE DAILY AND DON'T INHALE, Disp: , Rfl:     gabapentin (NEURONTIN) 300 MG capsule, Take 1 capsule by mouth Daily., Disp: , Rfl:     levocetirizine (XYZAL) 5 MG tablet, , Disp: , Rfl:     multivitamin (THERAGRAN) tablet tablet, Daily., Disp: , Rfl:     multivitamin with minerals tablet tablet, Take 2 tablets by mouth Daily., Disp: , Rfl:     pantoprazole (PROTONIX) 40 MG EC tablet, TAKE ONE ORALLY DAILY 30 MINUTES TO 2 HOURS BEFORE DINNER, Disp: , Rfl:     Semaglutide-Weight Management (Wegovy) 0.25 MG/0.5ML solution auto-injector, Inject 0.5 mL under the skin into the appropriate area as directed 1 (One) Time Per Week., Disp: 2 mL, Rfl: 0    Semaglutide-Weight Management (Wegovy) 0.5 MG/0.5ML solution auto-injector, Inject 0.5 mL under the skin into the appropriate area as directed 1 (One) Time Per Week for 28 days., Disp: 2 mL, Rfl: 0    Wegovy 1 MG/0.5ML solution auto-injector, Inject 0.5 mL under the skin into the appropriate area as directed 1 (One) Time Per Week for 28 days., Disp: 2 mL, Rfl: 0    Past Medical History:   Diagnosis Date    Abnormal uterine bleeding     Acute foot pain, left     Allergic rhinitis     Anemia     Ankle joint pain     Bacterial  vaginosis     Benign essential hypertension     BMI 50.0-59.9, adult     BPPV (benign paroxysmal positional vertigo)     Daytime hypersomnolence     Edema     Hemorrhoids     History of blood transfusion     History of chickenpox     Hx of bariatric surgery     Hyperglycemia     Hypocalcemia     Immunization deficiency 9/28/2022    Irregular menstrual cycle     Irritability and anger     Macromastia     Menorrhagia     Obesity     Obstructive sleep apnea syndrome     Optic nerve swelling     Palpitations     Sleep difficulties     Snoring     Strain of right trapezius muscle 9/28/2022    Swelling     Swelling of both ankles     Trapezius muscle spasm     Weight gain     Wheezing        Past Surgical History:   Procedure Laterality Date    BARIATRIC SURGERY      BUNIONECTOMY Left 11/30/2021    left foot    GASTRIC SLEEVE LAPAROSCOPIC N/A 04/18/2018    Procedure: GASTRIC SLEEVE LAPAROSCOPIC;  Surgeon: Christ Sunshine Jr., MD;  Location[place] [facility] [place] [facility] [place]  Service: Bariatric    TUBAL ABDOMINAL LIGATION      VAGINAL HYSTERECTOMY         Family History   Problem Relation Age of Onset    Obesity Mother     Hypertension Mother     Stroke Mother     Coronary artery disease Mother     Sleep apnea Mother     Hypertension Sister     Hypertension Maternal Grandfather     Malig Hyperthermia Neg Hx        Social History     Tobacco Use    Smoking status: Never     Passive exposure: Never    Smokeless tobacco: Never   Substance Use Topics    Alcohol use: No            Objective     Vitals:    04/30/24 0823   BP: 140/90   Pulse:    Resp:    Temp:    SpO2:      Body mass index is 42.4 kg/m².    Physical Exam  Vitals reviewed.   Constitutional:       Appearance: She is well-developed. She is not diaphoretic.   HENT:      Head: Normocephalic and atraumatic.   Eyes:      General: No scleral icterus.     Pupils: Pupils are equal, round, and reactive to light.   Neck:      Thyroid: No thyromegaly.   Cardiovascular:      Rate and Rhythm:  Normal rate and regular rhythm.      Heart sounds: No murmur heard.     No friction rub. No gallop.   Pulmonary:      Effort: Pulmonary effort is normal. No respiratory distress.      Breath sounds: No wheezing or rales.   Chest:      Chest wall: No tenderness.   Abdominal:      General: Bowel sounds are normal. There is no distension.      Palpations: Abdomen is soft.      Tenderness: There is no abdominal tenderness.   Musculoskeletal:         General: No deformity. Normal range of motion.   Lymphadenopathy:      Cervical: No cervical adenopathy.   Skin:     General: Skin is warm and dry.      Findings: No rash.   Neurological:      Cranial Nerves: No cranial nerve deficit.      Motor: No abnormal muscle tone.         Lab Results   Component Value Date    GLUCOSE 83 09/25/2023    BUN 16 09/25/2023    CREATININE 1.06 (H) 09/25/2023    EGFRIFNONA 68 01/26/2022    EGFRIFAFRI 79 01/26/2022    BCR 15 09/25/2023    K 4.9 09/25/2023    CO2 23 09/25/2023    CALCIUM 8.9 09/25/2023    PROTENTOTREF 6.5 03/28/2023    ALBUMIN 4.5 03/28/2023    LABIL2 2.3 03/28/2023    AST 12 03/28/2023    ALT 13 03/28/2023       WBC   Date Value Ref Range Status   02/28/2024 8.41 4.5 - 11.0 10*3/uL Final     RBC   Date Value Ref Range Status   02/28/2024 4.45 4.0 - 5.2 10*6/uL Final     Hemoglobin   Date Value Ref Range Status   02/28/2024 12.4 12.0 - 16.0 g/dL Final     Hematocrit   Date Value Ref Range Status   02/28/2024 37.5 36.0 - 46.0 % Final     MCV   Date Value Ref Range Status   02/28/2024 84.3 80.0 - 100.0 fL Final     MCH   Date Value Ref Range Status   02/28/2024 27.9 26.0 - 34.0 pg Final     MCHC   Date Value Ref Range Status   02/28/2024 33.1 31.0 - 37.0 g/dL Final     RDW   Date Value Ref Range Status   02/28/2024 14.0 12.0 - 16.8 % Final     RDW-SD   Date Value Ref Range Status   07/12/2018 47.3 37.0 - 54.0 fl Final     MPV   Date Value Ref Range Status   02/28/2024 9.9 8.4 - 12.4 fL Final     Platelets   Date Value Ref Range  Status   02/28/2024 422 140 - 440 10*3/uL Final     Neutrophil Rel %   Date Value Ref Range Status   02/28/2024 47.9 45 - 80 % Final     Lymphocyte Rel %   Date Value Ref Range Status   02/28/2024 41.9 15 - 50 % Final     Monocyte Rel %   Date Value Ref Range Status   02/28/2024 7.0 0 - 15 % Final     Eosinophil %   Date Value Ref Range Status   02/28/2024 1.9 0 - 7 % Final     Basophil Rel %   Date Value Ref Range Status   02/28/2024 0.8 0 - 2 % Final     Immature Grans %   Date Value Ref Range Status   02/28/2024 0.5 0.0 - 1.0 % Final     Neutrophils Absolute   Date Value Ref Range Status   02/28/2024 4.03 2.0 - 8.8 10*3/uL Final     Lymphocytes Absolute   Date Value Ref Range Status   02/28/2024 3.52 0.7 - 5.5 10*3/uL Final     Monocytes Absolute   Date Value Ref Range Status   02/28/2024 0.59 0.0 - 1.7 10*3/uL Final     Eosinophils Absolute   Date Value Ref Range Status   02/28/2024 0.16 0.0 - 0.8 10*3/uL Final     Basophils Absolute   Date Value Ref Range Status   02/28/2024 0.07 0.0 - 0.2 10*3/uL Final     Immature Grans, Absolute   Date Value Ref Range Status   02/28/2024 0.04 0.00 - 0.10 10*3/uL Final     nRBC   Date Value Ref Range Status   02/28/2024 0 0 /100(WBC) Final       Lab Results   Component Value Date    HGBA1C 5.10 03/28/2023       Lab Results   Component Value Date    FBDPFKBZ37 822 03/28/2023       TSH   Date Value Ref Range Status   03/28/2023 0.555 0.270 - 4.200 uIU/mL Final   12/19/2017 0.915 0.270 - 4.200 mIU/mL Final       Lab Results   Component Value Date    CHOL 150 12/19/2017     Lab Results   Component Value Date    TRIG 57 03/28/2023     Lab Results   Component Value Date    HDL 51 03/28/2023     Lab Results   Component Value Date    LDL 92 03/28/2023     Lab Results   Component Value Date    VLDL 12 03/28/2023     Lab Results   Component Value Date    LDLHDL 2.63 12/19/2017         Procedures    Assessment & Plan   Problems Addressed this Visit       Benign essential hypertension -  Primary     Diagnoses         Codes Comments    Benign essential hypertension    -  Primary ICD-10-CM: I10  ICD-9-CM: 401.1           HTN.  Uncontrolle.d  Stop olmesartan.  Stop hctz.  Start spironolactone/hctz 25/25 a day.  Continue amlo 5mg a day.    No orders of the defined types were placed in this encounter.      Current Outpatient Medications   Medication Sig Dispense Refill    acetaminophen (TYLENOL) 500 MG tablet Take 1 tablet by mouth Every 4 (Four) Hours As Needed.      amLODIPine (NORVASC) 5 MG tablet Take 1 tablet by mouth Daily. 90 tablet 1    Calcium Carbonate-Vit D-Min (Caltrate 600+D Plus Minerals) 600-800 MG-UNIT chewable tablet 1 BID 60 tablet     docusate sodium 100 MG capsule Take 1 capsule by mouth As Needed.      fluticasone (FLONASE) 50 MCG/ACT nasal spray SPRAY TWICE IN EACH NOSTRIL TOWARDS THE OUTSIDE OF THE NOSE ONCE DAILY AND DON'T INHALE      gabapentin (NEURONTIN) 300 MG capsule Take 1 capsule by mouth Daily.      levocetirizine (XYZAL) 5 MG tablet       multivitamin (THERAGRAN) tablet tablet Daily.      multivitamin with minerals tablet tablet Take 2 tablets by mouth Daily.      pantoprazole (PROTONIX) 40 MG EC tablet TAKE ONE ORALLY DAILY 30 MINUTES TO 2 HOURS BEFORE DINNER      Semaglutide-Weight Management (Wegovy) 0.25 MG/0.5ML solution auto-injector Inject 0.5 mL under the skin into the appropriate area as directed 1 (One) Time Per Week. 2 mL 0    Semaglutide-Weight Management (Wegovy) 0.5 MG/0.5ML solution auto-injector Inject 0.5 mL under the skin into the appropriate area as directed 1 (One) Time Per Week for 28 days. 2 mL 0    Wegovy 1 MG/0.5ML solution auto-injector Inject 0.5 mL under the skin into the appropriate area as directed 1 (One) Time Per Week for 28 days. 2 mL 0     No current facility-administered medications for this visit.       Shruthi Dockery had no medications administered during this visit.    Return in about 3 months (around 7/30/2024).    There are no  Patient Instructions on file for this visit.

## 2024-07-09 RX ORDER — SEMAGLUTIDE 1 MG/.5ML
1 INJECTION, SOLUTION SUBCUTANEOUS WEEKLY
Qty: 2 ML | Refills: 0 | OUTPATIENT
Start: 2024-07-09 | End: 2024-08-06

## 2024-07-11 RX ORDER — SEMAGLUTIDE 1 MG/.5ML
1 INJECTION, SOLUTION SUBCUTANEOUS WEEKLY
Qty: 2 ML | Refills: 0 | Status: SHIPPED | OUTPATIENT
Start: 2024-07-11 | End: 2024-08-09

## 2024-07-16 ENCOUNTER — OFFICE VISIT (OUTPATIENT)
Dept: BARIATRICS/WEIGHT MGMT | Facility: CLINIC | Age: 49
End: 2024-07-16
Payer: COMMERCIAL

## 2024-07-16 VITALS
HEART RATE: 83 BPM | TEMPERATURE: 97.9 F | WEIGHT: 247.5 LBS | BODY MASS INDEX: 41.23 KG/M2 | DIASTOLIC BLOOD PRESSURE: 82 MMHG | SYSTOLIC BLOOD PRESSURE: 116 MMHG | HEIGHT: 65 IN

## 2024-07-16 DIAGNOSIS — R53.82 CHRONIC FATIGUE: ICD-10-CM

## 2024-07-16 DIAGNOSIS — I10 BENIGN ESSENTIAL HYPERTENSION: ICD-10-CM

## 2024-07-16 DIAGNOSIS — G47.33 OSA (OBSTRUCTIVE SLEEP APNEA): ICD-10-CM

## 2024-07-16 DIAGNOSIS — Z98.84 S/P LAPAROSCOPIC SLEEVE GASTRECTOMY: ICD-10-CM

## 2024-07-16 DIAGNOSIS — E66.01 CLASS 3 SEVERE OBESITY WITH SERIOUS COMORBIDITY AND BODY MASS INDEX (BMI) OF 40.0 TO 44.9 IN ADULT, UNSPECIFIED OBESITY TYPE: Primary | ICD-10-CM

## 2024-07-16 PROCEDURE — 99214 OFFICE O/P EST MOD 30 MIN: CPT | Performed by: NURSE PRACTITIONER

## 2024-07-16 RX ORDER — SEMAGLUTIDE 1.7 MG/.75ML
1.7 INJECTION, SOLUTION SUBCUTANEOUS WEEKLY
Qty: 3 ML | Refills: 2 | Status: SHIPPED | OUTPATIENT
Start: 2024-07-16 | End: 2024-10-08

## 2024-07-16 NOTE — PROGRESS NOTES
[facility] BARIATRIC [facility] BARIATRIC SURGERY  [address]  [facility] [id number] [phone]  [address]  [facility] [id number] [phone]  Dept: [phone]  7/16/2024      Shruthi Dockery.  [id number]  [id number]  1975  female      Chief Complaint   Patient presents with    Follow-up     Follow up sleeve/RX-Wegovy       BH Post-Op Bariatric Surgery:   Shruthi Dockery is status post Laparoscopic Sleeve procedure, performed on 4/18/18 who started wegovy three months ago and is currently taking the 1mg dose weekly.     HPI:       Today's weight is 112 kg (247 lb 8 oz) pounds, today's BMI is Body mass index is 41.19 kg/m²., she has a loss of 12 pounds since the last visit and her weight loss since surgery is 53 pounds. The patient reports a decreased portion size and loss of appetite.    Shruthi Dockery denies nausea, vomiting, abdominal pain, constipation, dizziness, hypoglycemia and reports she is still doing well getting all of her protein and fluids and she may have a protein shake to supplement 1 to 2 days a week but is primarily getting most of her nutrition from food.  She can tolerate a 3 ounce steak and half a cup to a cup of vegetables in a sitting     Diet and Exercise: Diet history reviewed and discussed with the patient. Weight loss/gains to date discussed with the patient. The patient states they are eating 70 grams of protein per day. She reports eating 3 meals per day, a typical portion size of 1/2 cup, eating 1 snacks per day, drinking 5-6 or more 8-oz. glasses of water per day, no carbonated beverage consumption and exercising regularly.     Supplements: OTC MTV with iron.     Review of Systems   Constitutional:  Positive for appetite change. Negative for fatigue and unexpected weight change.   HENT: Negative.     Eyes: Negative.    Respiratory: Negative.     Cardiovascular: Negative.  Negative for leg swelling.    Gastrointestinal:  Negative for abdominal distention, abdominal pain, constipation, diarrhea, nausea and vomiting.   Genitourinary:  Negative for difficulty urinating, frequency and urgency.   Musculoskeletal:  Negative for back pain.   Skin: Negative.    Psychiatric/Behavioral: Negative.     All other systems reviewed and are negative.      Patient Active Problem List   Diagnosis    Chronic fatigue    Edema    TERRI (obstructive sleep apnea)    Class 3 severe obesity with serious comorbidity and body mass index (BMI) of 40.0 to 44.9 in adult    Allergic rhinitis    Anemia    Benign essential hypertension    BPPV (benign paroxysmal positional vertigo)    Hyperglycemia    Wheezing    Palpitations    S/P laparoscopic sleeve gastrectomy    Primary osteoarthritis involving multiple joints    Malabsorption due to intolerance, not elsewhere classified    Primary insomnia    Macromastia    Encounter for annual physical exam    Cervical strain    Thyroid mass    Swelling of thyroid gland    Hypocalcemia    Strain of left trapezius muscle    Acute bronchitis due to other specified organisms       Past Medical History:   Diagnosis Date    Abnormal uterine bleeding     Acute foot pain, left     Allergic rhinitis     Anemia     Ankle joint pain     Bacterial vaginosis     Benign essential hypertension     BMI 50.0-59.9, adult     BPPV (benign paroxysmal positional vertigo)     Daytime hypersomnolence     Edema     Hemorrhoids     History of blood transfusion     History of chickenpox     Hx of bariatric surgery     Hyperglycemia     Hypocalcemia     Immunization deficiency 9/28/2022    Irregular menstrual cycle     Irritability and anger     Macromastia     Menorrhagia     Obesity     Obstructive sleep apnea syndrome     Optic nerve swelling     Palpitations     Sleep difficulties     Snoring     Strain of right trapezius muscle 9/28/2022    Swelling     Swelling of both ankles     Trapezius muscle spasm     Weight gain      Wheezing        The following portions of the patient's history were reviewed and updated as appropriate: allergies, current medications, past family history, past medical history, past social history, past surgical history, and problem list.    Vitals:    07/16/24 0950   BP: 116/82   Pulse: 83   Temp: 97.9 °F (36.6 °C)       Physical Exam  Vitals and nursing note reviewed.   Constitutional:       Appearance: She is well-developed.   Neck:      Thyroid: No thyromegaly.   Cardiovascular:      Rate and Rhythm: Normal rate.   Pulmonary:      Effort: Pulmonary effort is normal. No respiratory distress.   Abdominal:      Palpations: Abdomen is soft.   Musculoskeletal:         General: No tenderness.   Skin:     General: Skin is warm and dry.   Neurological:      Mental Status: She is alert.   Psychiatric:         Behavior: Behavior normal.         Assessment:   Post-op, the patient is doing well.     Encounter Diagnoses   Name Primary?    Class 3 severe obesity with serious comorbidity and body mass index (BMI) of 40.0 to 44.9 in adult, unspecified obesity type Yes    Benign essential hypertension     S/P laparoscopic sleeve gastrectomy     Chronic fatigue     TERRI (obstructive sleep apnea)        Plan:     Encouraged patient to be sure to get plenty of lean protein per day through small frequent meals all with a protein source.   Activity restrictions: none.   Recommended patient be sure to get at least 70 grams of protein per day by eating small, frequent meals all with high lean protein choices. Be sure to limit/cut back on daily carbohydrate intake. Discussed with the patient the recommended amount of water per day to intake- half of body weight in ounces. Reviewed vitamin requirements. Be sure to do routine exercise, 150 minutes per week minimum, including both cardio and strength training.     Instructions / Recommendations: dietary counseling recommended, recommended a daily protein intake of  grams, vitamin  supplement(s) recommended, recommended exercising at least 150 minutes per week, behavior modifications recommended and instructed to call the office for concerns, questions, or problems.     The patient was instructed to follow up in 3 months .     Total time spent during this encounter today was 25 minutes

## 2024-08-06 ENCOUNTER — OFFICE VISIT (OUTPATIENT)
Dept: FAMILY MEDICINE CLINIC | Facility: CLINIC | Age: 49
End: 2024-08-06
Payer: COMMERCIAL

## 2024-08-06 VITALS
WEIGHT: 247 LBS | DIASTOLIC BLOOD PRESSURE: 82 MMHG | TEMPERATURE: 97.7 F | HEIGHT: 65 IN | HEART RATE: 79 BPM | SYSTOLIC BLOOD PRESSURE: 128 MMHG | BODY MASS INDEX: 41.15 KG/M2 | RESPIRATION RATE: 18 BRPM | OXYGEN SATURATION: 98 %

## 2024-08-06 DIAGNOSIS — M77.12 LATERAL EPICONDYLITIS OF LEFT ELBOW: ICD-10-CM

## 2024-08-06 DIAGNOSIS — I10 BENIGN ESSENTIAL HYPERTENSION: ICD-10-CM

## 2024-08-06 DIAGNOSIS — R73.9 HYPERGLYCEMIA: ICD-10-CM

## 2024-08-06 DIAGNOSIS — Z00.00 ENCOUNTER FOR ANNUAL PHYSICAL EXAM: Primary | ICD-10-CM

## 2024-08-06 DIAGNOSIS — M65.4 TENOSYNOVITIS, DE QUERVAIN: ICD-10-CM

## 2024-08-06 PROCEDURE — 99396 PREV VISIT EST AGE 40-64: CPT | Performed by: FAMILY MEDICINE

## 2024-08-06 PROCEDURE — 99213 OFFICE O/P EST LOW 20 MIN: CPT | Performed by: FAMILY MEDICINE

## 2024-08-06 NOTE — PROGRESS NOTES
Patient here for annual physical exam    Wale Dockery is a 48 y.o. female.     History of Present Illness   47 yo female here for annual.    The following portions of the patient's history were reviewed and updated as appropriate: allergies, current medications, past family history, past medical history, past social history, past surgical history and problem list    Last colonoscopy:2020.  Optometry:utd  Dentist utd.  Last PSA(if applicable):na  Last mammo(if applicable):upcoming sept 2024  PAP 2/24.      C/o pain in L arm for months.  Hard to  items.  No better.      Immunization History   Administered Date(s) Administered    31-influenza Vac Quardvalent Preservativ 09/15/2014, 12/18/2015, 10/10/2016, 10/11/2017    COVID-19 (PFIZER) BIVALENT 12+YRS 09/28/2022    COVID-19 (PFIZER) Purple Cap Monovalent 03/21/2021, 04/10/2021    Flu Vaccine Intradermal Quad 18-64YR 11/19/2018    Flu Vaccine Split Quad 11/19/2018, 11/19/2018, 10/03/2019, 09/18/2020    Fluzone (or Fluarix & Flulaval for VFC) >6mos 11/19/2018, 10/03/2019, 09/18/2020, 09/28/2022    Hepatitis A 05/03/2018, 11/19/2018    Influenza, Unspecified 02/08/2024    Tdap 08/26/2020       Review of Systems   Constitutional:  Negative for appetite change and fatigue.   HENT:  Negative for nosebleeds and sore throat.    Eyes:  Negative for blurred vision and visual disturbance.   Respiratory:  Negative for shortness of breath and wheezing.    Cardiovascular:  Negative for chest pain and leg swelling.   Gastrointestinal:  Negative for abdominal distention and abdominal pain.   Endocrine: Negative for cold intolerance and polyuria.   Genitourinary:  Negative for dysuria and hematuria.   Musculoskeletal:  Negative for arthralgias and myalgias.   Skin:  Negative for color change and rash.   Neurological:  Negative for weakness and confusion.   Psychiatric/Behavioral:  Negative for agitation and depressed mood.        Patient Active Problem List    Diagnosis    Chronic fatigue    Edema    TERRI (obstructive sleep apnea)    Class 3 severe obesity with serious comorbidity and body mass index (BMI) of 40.0 to 44.9 in adult    Allergic rhinitis    Anemia    Benign essential hypertension    BPPV (benign paroxysmal positional vertigo)    Hyperglycemia    Wheezing    Palpitations    S/P laparoscopic sleeve gastrectomy    Primary osteoarthritis involving multiple joints    Malabsorption due to intolerance, not elsewhere classified    Primary insomnia    Macromastia    Encounter for annual physical exam    Cervical strain    Thyroid mass    Swelling of thyroid gland    Hypocalcemia    Strain of left trapezius muscle    Acute bronchitis due to other specified organisms    Lateral epicondylitis of left elbow    Tenosynovitis, de Quervain       Allergies   Allergen Reactions    Lisinopril-Hydrochlorothiazide Cough    Penicillins Hives    Skelaxin [Metaxalone] Hives    Sulfa Antibiotics Hives         Current Outpatient Medications:     acetaminophen (TYLENOL) 500 MG tablet, Take 1 tablet by mouth Every 4 (Four) Hours As Needed., Disp: , Rfl:     amLODIPine (NORVASC) 5 MG tablet, Take 1 tablet by mouth Daily., Disp: 90 tablet, Rfl: 3    Calcium Carbonate-Vit D-Min (Caltrate 600+D Plus Minerals) 600-800 MG-UNIT chewable tablet, 1 BID, Disp: 60 tablet, Rfl:     docusate sodium 100 MG capsule, Take 1 capsule by mouth As Needed., Disp: , Rfl:     fluticasone (FLONASE) 50 MCG/ACT nasal spray, SPRAY TWICE IN EACH NOSTRIL TOWARDS THE OUTSIDE OF THE NOSE ONCE DAILY AND DON'T INHALE, Disp: , Rfl:     gabapentin (NEURONTIN) 300 MG capsule, Take 1 capsule by mouth Daily., Disp: , Rfl:     levocetirizine (XYZAL) 5 MG tablet, , Disp: , Rfl:     multivitamin (THERAGRAN) tablet tablet, Daily., Disp: , Rfl:     multivitamin with minerals tablet tablet, Take 2 tablets by mouth Daily., Disp: , Rfl:     pantoprazole (PROTONIX) 40 MG EC tablet, TAKE ONE ORALLY DAILY 30 MINUTES TO 2 HOURS  BEFORE DINNER, Disp: , Rfl:     spironolactone-hydrochlorothiazide (ALDACTAZIDE) 25-25 MG tablet, Take 1 tablet by mouth Daily., Disp: 90 tablet, Rfl: 3    Wegovy 1.7 MG/0.75ML solution auto-injector, Inject 0.75 mL under the skin into the appropriate area as directed 1 (One) Time Per Week for 84 days., Disp: 3 mL, Rfl: 2    Past Medical History:   Diagnosis Date    Abnormal uterine bleeding     Acute foot pain, left     Allergic rhinitis     Anemia     Ankle joint pain     Bacterial vaginosis     Benign essential hypertension     BMI 50.0-59.9, adult     BPPV (benign paroxysmal positional vertigo)     Daytime hypersomnolence     Edema     Hemorrhoids     History of blood transfusion     History of chickenpox     Hx of bariatric surgery     Hyperglycemia     Hypocalcemia     Immunization deficiency 9/28/2022    Irregular menstrual cycle     Irritability and anger     Macromastia     Menorrhagia     Obesity     Obstructive sleep apnea syndrome     Optic nerve swelling     Palpitations     Sleep difficulties     Snoring     Strain of right trapezius muscle 9/28/2022    Swelling     Swelling of both ankles     Trapezius muscle spasm     Weight gain     Wheezing        Past Surgical History:   Procedure Laterality Date    BARIATRIC SURGERY      BUNIONECTOMY Left 11/30/2021    left foot    GASTRIC SLEEVE LAPAROSCOPIC N/A 04/18/2018    Procedure: GASTRIC SLEEVE LAPAROSCOPIC;  Surgeon: Christ Sunshine Jr., MD;  [place] [facility] [place] [facility] [place]  Service: Bariatric    TUBAL ABDOMINAL LIGATION      VAGINAL HYSTERECTOMY         Family History   Problem Relation Age of Onset    Obesity Mother     Hypertension Mother     Stroke Mother     Coronary artery disease Mother     Sleep apnea Mother     Hypertension Sister     Hypertension Maternal Grandfather     Malig Hyperthermia Neg Hx        Social History     Tobacco Use    Smoking status: Never     Passive exposure: Never    Smokeless tobacco: Never   Substance Use Topics    Alcohol  use: No            Objective     Vitals:    08/06/24 1052   BP: 128/82   Pulse: 79   Resp: 18   Temp: 97.7 °F (36.5 °C)   SpO2: 98%     Body mass index is 41.1 kg/m².    Physical Exam  Vitals reviewed.   Constitutional:       Appearance: She is well-developed. She is not diaphoretic.   HENT:      Head: Normocephalic and atraumatic.   Eyes:      General: No scleral icterus.     Pupils: Pupils are equal, round, and reactive to light.   Neck:      Thyroid: No thyromegaly.   Cardiovascular:      Rate and Rhythm: Normal rate and regular rhythm.      Heart sounds: No murmur heard.     No friction rub. No gallop.   Pulmonary:      Effort: Pulmonary effort is normal. No respiratory distress.      Breath sounds: No wheezing or rales.   Chest:      Chest wall: No tenderness.   Abdominal:      General: Bowel sounds are normal. There is no distension.      Palpations: Abdomen is soft.      Tenderness: There is no abdominal tenderness.   Musculoskeletal:         General: No deformity. Normal range of motion.      Comments: Positive pfinkelstein test on L , TTP lateral epicondyle.     Lymphadenopathy:      Cervical: No cervical adenopathy.   Skin:     General: Skin is warm and dry.      Findings: No rash.   Neurological:      Cranial Nerves: No cranial nerve deficit.      Motor: No abnormal muscle tone.         Lab Results   Component Value Date    GLUCOSE 83 09/25/2023    BUN 16 09/25/2023    CREATININE 1.06 (H) 09/25/2023    EGFRIFNONA 68 01/26/2022    EGFRIFAFRI 79 01/26/2022    BCR 15 09/25/2023    K 4.9 09/25/2023    CO2 23 09/25/2023    CALCIUM 8.9 09/25/2023    PROTENTOTREF 6.5 03/28/2023    ALBUMIN 4.5 03/28/2023    LABIL2 2.3 03/28/2023    AST 12 03/28/2023    ALT 13 03/28/2023       WBC   Date Value Ref Range Status   02/28/2024 8.41 4.5 - 11.0 10*3/uL Final     RBC   Date Value Ref Range Status   02/28/2024 4.45 4.0 - 5.2 10*6/uL Final     Hemoglobin   Date Value Ref Range Status   02/28/2024 12.4 12.0 - 16.0 g/dL  Final     Hematocrit   Date Value Ref Range Status   02/28/2024 37.5 36.0 - 46.0 % Final     MCV   Date Value Ref Range Status   02/28/2024 84.3 80.0 - 100.0 fL Final     MCH   Date Value Ref Range Status   02/28/2024 27.9 26.0 - 34.0 pg Final     MCHC   Date Value Ref Range Status   02/28/2024 33.1 31.0 - 37.0 g/dL Final     RDW   Date Value Ref Range Status   02/28/2024 14.0 12.0 - 16.8 % Final     RDW-SD   Date Value Ref Range Status   07/12/2018 47.3 37.0 - 54.0 fl Final     MPV   Date Value Ref Range Status   02/28/2024 9.9 8.4 - 12.4 fL Final     Platelets   Date Value Ref Range Status   02/28/2024 422 140 - 440 10*3/uL Final     Neutrophil Rel %   Date Value Ref Range Status   02/28/2024 47.9 45 - 80 % Final     Lymphocyte Rel %   Date Value Ref Range Status   02/28/2024 41.9 15 - 50 % Final     Monocyte Rel %   Date Value Ref Range Status   02/28/2024 7.0 0 - 15 % Final     Eosinophil %   Date Value Ref Range Status   02/28/2024 1.9 0 - 7 % Final     Basophil Rel %   Date Value Ref Range Status   02/28/2024 0.8 0 - 2 % Final     Immature Grans %   Date Value Ref Range Status   02/28/2024 0.5 0.0 - 1.0 % Final     Neutrophils Absolute   Date Value Ref Range Status   02/28/2024 4.03 2.0 - 8.8 10*3/uL Final     Lymphocytes Absolute   Date Value Ref Range Status   02/28/2024 3.52 0.7 - 5.5 10*3/uL Final     Monocytes Absolute   Date Value Ref Range Status   02/28/2024 0.59 0.0 - 1.7 10*3/uL Final     Eosinophils Absolute   Date Value Ref Range Status   02/28/2024 0.16 0.0 - 0.8 10*3/uL Final     Basophils Absolute   Date Value Ref Range Status   02/28/2024 0.07 0.0 - 0.2 10*3/uL Final     Immature Grans, Absolute   Date Value Ref Range Status   02/28/2024 0.04 0.00 - 0.10 10*3/uL Final     [facility]   Date Value Ref Range Status   02/28/2024 0 0 /100(WBC) Final       Lab Results   Component Value Date    HGBA1C 5.10 03/28/2023       Lab Results   Component Value Date    PGNIJVWY17 822 03/28/2023       TSH   Date  Value Ref Range Status   03/28/2023 0.555 0.270 - 4.200 uIU/mL Final   12/19/2017 0.915 0.270 - 4.200 mIU/mL Final       Lab Results   Component Value Date    CHOL 150 12/19/2017     Lab Results   Component Value Date    TRIG 57 03/28/2023     Lab Results   Component Value Date    HDL 51 03/28/2023     Lab Results   Component Value Date    LDL 92 03/28/2023     Lab Results   Component Value Date    VLDL 12 03/28/2023     Lab Results   Component Value Date    LDLHDL 2.63 12/19/2017         Procedures    Assessment & Plan   Problems Addressed this Visit       Hyperglycemia    Encounter for annual physical exam - Primary    Lateral epicondylitis of left elbow    Tenosynovitis, de Quervain     Diagnoses         Codes Comments    Encounter for annual physical exam    -  Primary ICD-10-CM: Z00.00  ICD-9-CM: V70.0     Hyperglycemia     ICD-10-CM: R73.9  ICD-9-CM: 790.29     Lateral epicondylitis of left elbow     ICD-10-CM: M77.12  ICD-9-CM: 726.32     Tenosynovitis, de Quervain     ICD-10-CM: M65.4  ICD-9-CM: 727.04         Lateral epicondylitis with tenosynovitis, thumb spica splint and tennis elbow band.  If no better in 8 weeks, to ortho.    Preventive Counseling:  Encouraged to stay active.  Covid vaccine UTD.  HEP A UTD.  Pneumovax UTD.  Colonoscopy UTD.    Dentist UTD.  Optho UTD.      No orders of the defined types were placed in this encounter.      Current Outpatient Medications   Medication Sig Dispense Refill    acetaminophen (TYLENOL) 500 MG tablet Take 1 tablet by mouth Every 4 (Four) Hours As Needed.      amLODIPine (NORVASC) 5 MG tablet Take 1 tablet by mouth Daily. 90 tablet 3    Calcium Carbonate-Vit D-Min (Caltrate 600+D Plus Minerals) 600-800 MG-UNIT chewable tablet 1 BID 60 tablet     docusate sodium 100 MG capsule Take 1 capsule by mouth As Needed.      fluticasone (FLONASE) 50 MCG/ACT nasal spray SPRAY TWICE IN EACH NOSTRIL TOWARDS THE OUTSIDE OF THE NOSE ONCE DAILY AND DON'T INHALE      gabapentin  (NEURONTIN) 300 MG capsule Take 1 capsule by mouth Daily.      levocetirizine (XYZAL) 5 MG tablet       multivitamin (THERAGRAN) tablet tablet Daily.      multivitamin with minerals tablet tablet Take 2 tablets by mouth Daily.      pantoprazole (PROTONIX) 40 MG EC tablet TAKE ONE ORALLY DAILY 30 MINUTES TO 2 HOURS BEFORE DINNER      spironolactone-hydrochlorothiazide (ALDACTAZIDE) 25-25 MG tablet Take 1 tablet by mouth Daily. 90 tablet 3    Wegovy 1.7 MG/0.75ML solution auto-injector Inject 0.75 mL under the skin into the appropriate area as directed 1 (One) Time Per Week for 84 days. 3 mL 2     No current facility-administered medications for this visit.       No follow-ups on file.    There are no Patient Instructions on file for this visit.

## 2024-08-09 LAB
ALBUMIN SERPL-MCNC: 4.1 G/DL (ref 3.9–4.9)
ALP SERPL-CCNC: 84 IU/L (ref 44–121)
ALT SERPL-CCNC: 12 IU/L (ref 0–32)
AST SERPL-CCNC: 12 IU/L (ref 0–40)
BILIRUB SERPL-MCNC: 0.3 MG/DL (ref 0–1.2)
BUN SERPL-MCNC: 14 MG/DL (ref 6–24)
BUN/CREAT SERPL: 11 (ref 9–23)
CALCIUM SERPL-MCNC: 9.6 MG/DL (ref 8.7–10.2)
CHLORIDE SERPL-SCNC: 99 MMOL/L (ref 96–106)
CHOLEST SERPL-MCNC: 162 MG/DL (ref 100–199)
CHOLEST/HDLC SERPL: 3.8 RATIO (ref 0–4.4)
CO2 SERPL-SCNC: 26 MMOL/L (ref 20–29)
CREAT SERPL-MCNC: 1.31 MG/DL (ref 0.57–1)
EGFRCR SERPLBLD CKD-EPI 2021: 50 ML/MIN/1.73
GLOBULIN SER CALC-MCNC: 2.4 G/DL (ref 1.5–4.5)
GLUCOSE SERPL-MCNC: 89 MG/DL (ref 70–99)
HBA1C MFR BLD: 6 % (ref 4.8–5.6)
HDLC SERPL-MCNC: 43 MG/DL
LDLC SERPL CALC-MCNC: 103 MG/DL (ref 0–99)
POTASSIUM SERPL-SCNC: 4.6 MMOL/L (ref 3.5–5.2)
PROT SERPL-MCNC: 6.5 G/DL (ref 6–8.5)
SODIUM SERPL-SCNC: 138 MMOL/L (ref 134–144)
TRIGL SERPL-MCNC: 86 MG/DL (ref 0–149)
VLDLC SERPL CALC-MCNC: 16 MG/DL (ref 5–40)

## 2024-09-20 DIAGNOSIS — I10 BENIGN ESSENTIAL HYPERTENSION: ICD-10-CM

## 2024-09-22 RX ORDER — AMLODIPINE BESYLATE 5 MG/1
5 TABLET ORAL DAILY
Qty: 90 TABLET | Refills: 1 | Status: SHIPPED | OUTPATIENT
Start: 2024-09-22

## 2024-10-16 ENCOUNTER — OFFICE VISIT (OUTPATIENT)
Dept: BARIATRICS/WEIGHT MGMT | Facility: CLINIC | Age: 49
End: 2024-10-16
Payer: COMMERCIAL

## 2024-10-16 VITALS
HEART RATE: 77 BPM | HEIGHT: 65 IN | TEMPERATURE: 97.8 F | BODY MASS INDEX: 40.48 KG/M2 | WEIGHT: 243 LBS | DIASTOLIC BLOOD PRESSURE: 84 MMHG | SYSTOLIC BLOOD PRESSURE: 142 MMHG

## 2024-10-16 DIAGNOSIS — G47.33 OSA (OBSTRUCTIVE SLEEP APNEA): ICD-10-CM

## 2024-10-16 DIAGNOSIS — E66.01 CLASS 3 SEVERE OBESITY WITH SERIOUS COMORBIDITY AND BODY MASS INDEX (BMI) OF 40.0 TO 44.9 IN ADULT, UNSPECIFIED OBESITY TYPE: Primary | ICD-10-CM

## 2024-10-16 DIAGNOSIS — E66.813 CLASS 3 SEVERE OBESITY WITH SERIOUS COMORBIDITY AND BODY MASS INDEX (BMI) OF 40.0 TO 44.9 IN ADULT, UNSPECIFIED OBESITY TYPE: Primary | ICD-10-CM

## 2024-10-16 DIAGNOSIS — Z98.84 S/P LAPAROSCOPIC SLEEVE GASTRECTOMY: ICD-10-CM

## 2024-10-16 PROCEDURE — 99214 OFFICE O/P EST MOD 30 MIN: CPT | Performed by: NURSE PRACTITIONER

## 2024-10-16 RX ORDER — SEMAGLUTIDE 2.4 MG/.75ML
2.4 INJECTION, SOLUTION SUBCUTANEOUS WEEKLY
Qty: 9 ML | Refills: 0 | Status: SHIPPED | OUTPATIENT
Start: 2024-10-16 | End: 2025-01-08

## 2024-10-16 RX ORDER — SEMAGLUTIDE 1.7 MG/.75ML
1.7 INJECTION, SOLUTION SUBCUTANEOUS WEEKLY
COMMUNITY
Start: 2024-09-21 | End: 2024-10-16

## 2024-10-16 NOTE — PROGRESS NOTES
[facility] BARIATRIC SURGERY  [address]  [facility] [id number] [phone]  [address]  [facility] [id number] [phone]  Dept: [phone]  10/16/2024      Shruthi oDckery.  [id number]  [id number]  1975  female      Chief Complaint   Patient presents with    Follow-up     Follow up rx-Wegovy        Post-Op Bariatric Surgery:   Shruthi Dockery is status post Laparoscopic Sleeve procedure, performed on 4/18/2018 who transitioned from saxenda to wegovy on 4/16/24 who has lost 14lb since starting    HPI:       Today's weight is 110 kg (243 lb) pounds, today's BMI is Body mass index is 40.44 kg/m²., she has a loss of 27 pounds since the last visit and her weight loss since surgery is 57 pounds. The patient reports a decreased portion size and loss of appetite.    Shruthi Dockery denies nausea, vomiting, reflux and reports that she is doing well     Diet and Exercise: Diet history reviewed and discussed with the patient. Weight loss/gains to date discussed with the patient. The patient states they are eating 72 grams of protein per day. She reports eating 3 meals per day, a typical portion size of 1/2 cup, eating 1-2 snacks per day, drinking 5-6 or more 8-oz. glasses of water per day, no carbonated beverage consumption and exercising regularly.     Supplements: none.     Review of Systems   Constitutional:  Positive for appetite change. Negative for fatigue and unexpected weight change.   HENT: Negative.     Eyes: Negative.    Respiratory: Negative.     Cardiovascular: Negative.  Negative for leg swelling.   Gastrointestinal:  Negative for abdominal distention, abdominal pain, constipation, diarrhea, nausea and vomiting.   Genitourinary:  Negative for difficulty urinating, frequency and urgency.   Musculoskeletal:  Negative for back pain.   Skin: Negative.    Psychiatric/Behavioral: Negative.     All other systems reviewed and  are negative.      Patient Active Problem List   Diagnosis    Chronic fatigue    Edema    TERRI (obstructive sleep apnea)    Class 3 severe obesity with serious comorbidity and body mass index (BMI) of 40.0 to 44.9 in adult    Allergic rhinitis    Anemia    Benign essential hypertension    BPPV (benign paroxysmal positional vertigo)    Hyperglycemia    Wheezing    Palpitations    S/P laparoscopic sleeve gastrectomy    Primary osteoarthritis involving multiple joints    Malabsorption due to intolerance, not elsewhere classified    Primary insomnia    Macromastia    Encounter for annual physical exam    Cervical strain    Thyroid mass    Swelling of thyroid gland    Hypocalcemia    Strain of left trapezius muscle    Acute bronchitis due to other specified organisms    Lateral epicondylitis of left elbow    Tenosynovitis, de Quervain       Past Medical History:   Diagnosis Date    Abnormal uterine bleeding     Acute foot pain, left     Allergic rhinitis     Anemia     Ankle joint pain     Bacterial vaginosis     Benign essential hypertension     BMI 50.0-59.9, adult     BPPV (benign paroxysmal positional vertigo)     Daytime hypersomnolence     Edema     Hemorrhoids     History of blood transfusion     History of chickenpox     Hx of bariatric surgery     Hyperglycemia     Hypocalcemia     Immunization deficiency 9/28/2022    Irregular menstrual cycle     Irritability and anger     Macromastia     Menorrhagia     Obesity     Obstructive sleep apnea syndrome     Optic nerve swelling     Palpitations     Sleep difficulties     Snoring     Strain of right trapezius muscle 9/28/2022    Swelling     Swelling of both ankles     Trapezius muscle spasm     Weight gain     Wheezing        The following portions of the patient's history were reviewed and updated as appropriate: allergies, current medications, past family history, past medical history, past social history, past surgical history, and problem list.    Vitals:     10/16/24 1018   BP: 142/84   Pulse: 77   Temp: 97.8 °F (36.6 °C)       Physical Exam  Vitals and nursing note reviewed.   Constitutional:       Appearance: She is well-developed.   Neck:      Thyroid: No thyromegaly.   Cardiovascular:      Rate and Rhythm: Normal rate.   Pulmonary:      Effort: Pulmonary effort is normal. No respiratory distress.   Abdominal:      Palpations: Abdomen is soft.   Musculoskeletal:         General: No tenderness.   Skin:     General: Skin is warm and dry.   Neurological:      Mental Status: She is alert.   Psychiatric:         Behavior: Behavior normal.         Assessment:   Post-op, the patient is doing well but still having sugar cravings but also having sleep disturbance which she associated with menopause.     Encounter Diagnoses   Name Primary?    Class 3 severe obesity with serious comorbidity and body mass index (BMI) of 40.0 to 44.9 in adult, unspecified obesity type Yes    S/P laparoscopic sleeve gastrectomy     TERRI (obstructive sleep apnea)        Plan:   Will increase wegovy to 2.4mg dose.  Encouraged patient to be sure to get plenty of lean protein per day through small frequent meals all with a protein source.   Activity restrictions: none.   Recommended patient be sure to get at least 70 grams of protein per day by eating small, frequent meals all with high lean protein choices. Be sure to limit/cut back on daily carbohydrate intake. Discussed with the patient the recommended amount of water per day to intake- half of body weight in ounces. Reviewed vitamin requirements. Be sure to do routine exercise, 150 minutes per week minimum, including both cardio and strength training.     Instructions / Recommendations: dietary counseling recommended, recommended a daily protein intake of  grams, vitamin supplement(s) recommended, recommended exercising at least 150 minutes per week, behavior modifications recommended and instructed to call the office for concerns, questions,  or problems.     The patient was instructed to follow up 3 months .     Total time spent during this encounter today was 25 minutes

## 2024-10-30 ENCOUNTER — TELEPHONE (OUTPATIENT)
Dept: BARIATRICS/WEIGHT MGMT | Facility: CLINIC | Age: 49
End: 2024-10-30
Payer: COMMERCIAL

## 2024-10-30 RX ORDER — SEMAGLUTIDE 1.7 MG/.75ML
INJECTION, SOLUTION SUBCUTANEOUS
Refills: 2 | OUTPATIENT
Start: 2024-10-30

## 2024-10-30 NOTE — TELEPHONE ENCOUNTER
LVM for patient to call me back in regards to the prescription refill request we got - Wegovy 1.7mg. Her last fill was for Wegovy 2.4mg. Need clarification from patient.

## 2024-10-30 NOTE — TELEPHONE ENCOUNTER
Patient called back and she is not on the 1.7mg. Provider put her on the next dose of 2.4mg. I have refused the prescription of the 1.7mg

## 2024-11-26 RX ORDER — SEMAGLUTIDE 2.4 MG/.75ML
2.4 INJECTION, SOLUTION SUBCUTANEOUS WEEKLY
OUTPATIENT
Start: 2024-11-26 | End: 2025-02-18

## 2024-11-26 RX ORDER — SEMAGLUTIDE 1.7 MG/.75ML
INJECTION, SOLUTION SUBCUTANEOUS
Refills: 2 | OUTPATIENT
Start: 2024-11-26

## 2025-01-29 ENCOUNTER — OFFICE VISIT (OUTPATIENT)
Dept: BARIATRICS/WEIGHT MGMT | Facility: CLINIC | Age: 50
End: 2025-01-29
Payer: COMMERCIAL

## 2025-01-29 VITALS
HEART RATE: 82 BPM | SYSTOLIC BLOOD PRESSURE: 137 MMHG | WEIGHT: 233 LBS | TEMPERATURE: 97.5 F | BODY MASS INDEX: 38.82 KG/M2 | DIASTOLIC BLOOD PRESSURE: 79 MMHG | HEIGHT: 65 IN

## 2025-01-29 DIAGNOSIS — I10 BENIGN ESSENTIAL HYPERTENSION: ICD-10-CM

## 2025-01-29 DIAGNOSIS — Z98.84 S/P LAPAROSCOPIC SLEEVE GASTRECTOMY: ICD-10-CM

## 2025-01-29 DIAGNOSIS — E66.813 CLASS 3 SEVERE OBESITY WITH SERIOUS COMORBIDITY AND BODY MASS INDEX (BMI) OF 40.0 TO 44.9 IN ADULT, UNSPECIFIED OBESITY TYPE: Primary | ICD-10-CM

## 2025-01-29 DIAGNOSIS — R53.82 CHRONIC FATIGUE: ICD-10-CM

## 2025-01-29 DIAGNOSIS — G47.33 OSA (OBSTRUCTIVE SLEEP APNEA): ICD-10-CM

## 2025-01-29 DIAGNOSIS — E66.01 CLASS 3 SEVERE OBESITY WITH SERIOUS COMORBIDITY AND BODY MASS INDEX (BMI) OF 40.0 TO 44.9 IN ADULT, UNSPECIFIED OBESITY TYPE: Primary | ICD-10-CM

## 2025-01-29 PROBLEM — J20.8 ACUTE BRONCHITIS DUE TO OTHER SPECIFIED ORGANISMS: Status: RESOLVED | Noted: 2023-12-27 | Resolved: 2025-01-29

## 2025-01-29 PROBLEM — Z00.00 ENCOUNTER FOR ANNUAL PHYSICAL EXAM: Status: RESOLVED | Noted: 2023-03-28 | Resolved: 2025-01-29

## 2025-01-29 PROCEDURE — 99214 OFFICE O/P EST MOD 30 MIN: CPT | Performed by: NURSE PRACTITIONER

## 2025-01-29 RX ORDER — SEMAGLUTIDE 2.4 MG/.75ML
INJECTION, SOLUTION SUBCUTANEOUS
COMMUNITY
Start: 2024-12-04 | End: 2025-01-29 | Stop reason: SDUPTHER

## 2025-01-29 RX ORDER — SEMAGLUTIDE 2.4 MG/.75ML
2.4 INJECTION, SOLUTION SUBCUTANEOUS WEEKLY
Qty: 9 ML | Refills: 0 | Status: SHIPPED | OUTPATIENT
Start: 2025-01-29

## 2025-01-29 NOTE — PROGRESS NOTES
[facility] BARIATRIC SURGERY  [address]  [facility] [id number] [phone]  [address]  [facility] [id number] [phone]  Dept: [phone]  1/29/2025      Shruthi Dockery.  [id number]  [id number]  1975  female      Chief Complaint   Patient presents with    Follow-up     Follow up sleeve/RX       [facility] Post-Op Bariatric Surgery:   Shruthi Dockery is status post Laparoscopic Sleeve procedure, performed on 4/18/18 who started wegovy in April and has lost 27lb since starting. She is currently on the 2.4mg weekly    HPI:       Today's weight is 106 kg (233 lb) pounds, today's BMI is Body mass index is 38.77 kg/m²., she has a loss of 10 pounds since the last visit and her weight loss since surgery is 67 pounds. The patient reports a decreased portion size and loss of appetite.    Shruthi Dockery denies nausea, vomiting, reflux and reports heartburn if she doesn't take her protonix but heartburn is well controlled.      Diet and Exercise: Diet history reviewed and discussed with the patient. Weight loss/gains to date discussed with the patient. The patient states they are eating 60 grams of protein per day. She reports eating 3 meals per day, a typical portion size of 1/2 cup, eating 1-2 snacks per day, drinking 5-6 or more 8-oz. glasses of water per day, no carbonated beverage consumption and exercising regularly.     Supplements: none.     Review of Systems   Constitutional:  Positive for appetite change. Negative for fatigue and unexpected weight change.   HENT: Negative.     Eyes: Negative.    Respiratory: Negative.     Cardiovascular: Negative.  Negative for leg swelling.   Gastrointestinal:  Negative for abdominal distention, abdominal pain, constipation, diarrhea, nausea and vomiting.   Genitourinary:  Negative for difficulty urinating, frequency and urgency.   Musculoskeletal:  Negative for back pain.   Skin: Negative.     Psychiatric/Behavioral: Negative.     All other systems reviewed and are negative.      Patient Active Problem List   Diagnosis    Chronic fatigue    Edema    TERRI (obstructive sleep apnea)    Class 3 severe obesity with serious comorbidity and body mass index (BMI) of 40.0 to 44.9 in adult    Allergic rhinitis    Anemia    Benign essential hypertension    BPPV (benign paroxysmal positional vertigo)    Hyperglycemia    Palpitations    S/P laparoscopic sleeve gastrectomy    Primary osteoarthritis involving multiple joints    Malabsorption due to intolerance, not elsewhere classified    Primary insomnia    Macromastia    Cervical strain    Thyroid mass    Swelling of thyroid gland    Hypocalcemia    Strain of left trapezius muscle    Lateral epicondylitis of left elbow    Tenosynovitis, de Quervain       Past Medical History:   Diagnosis Date    Abnormal uterine bleeding     Acute foot pain, left     Allergic rhinitis     Anemia     Ankle joint pain     Bacterial vaginosis     Benign essential hypertension     BMI 50.0-59.9, adult     BPPV (benign paroxysmal positional vertigo)     Daytime hypersomnolence     Edema     Hemorrhoids     History of blood transfusion     History of chickenpox     Hx of bariatric surgery     Hyperglycemia     Hypocalcemia     Immunization deficiency 9/28/2022    Irregular menstrual cycle     Irritability and anger     Macromastia     Menorrhagia     Obesity     Obstructive sleep apnea syndrome     Optic nerve swelling     Palpitations     Sleep difficulties     Snoring     Strain of right trapezius muscle 9/28/2022    Swelling     Swelling of both ankles     Trapezius muscle spasm     Weight gain     Wheezing        The following portions of the patient's history were reviewed and updated as appropriate: allergies, current medications, past family history, past medical history, past social history, past surgical history, and problem list.    Vitals:    01/29/25 1004   BP: 137/79   Pulse:  82   Temp: 97.5 °F (36.4 °C)       Physical Exam  Vitals and nursing note reviewed.   Constitutional:       Appearance: She is well-developed.   Neck:      Thyroid: No thyromegaly.   Cardiovascular:      Rate and Rhythm: Normal rate.   Pulmonary:      Effort: Pulmonary effort is normal. No respiratory distress.   Abdominal:      Palpations: Abdomen is soft.   Musculoskeletal:         General: No tenderness.   Skin:     General: Skin is warm and dry.   Neurological:      Mental Status: She is alert.   Psychiatric:         Behavior: Behavior normal.         Assessment:   Post-op, the patient is doing well.     Encounter Diagnoses   Name Primary?    Class 3 severe obesity with serious comorbidity and body mass index (BMI) of 40.0 to 44.9 in adult, unspecified obesity type Yes    Benign essential hypertension     Chronic fatigue     TERRI (obstructive sleep apnea)        Plan:   Continue wegovy at 2.4mg weekly.   Encouraged patient to be sure to get plenty of lean protein per day through small frequent meals all with a protein source.   Activity restrictions: none.   Recommended patient be sure to get at least 70 grams of protein per day by eating small, frequent meals all with high lean protein choices. Be sure to limit/cut back on daily carbohydrate intake. Discussed with the patient the recommended amount of water per day to intake- half of body weight in ounces. Reviewed vitamin requirements. Be sure to do routine exercise, 150 minutes per week minimum, including both cardio and strength training.     Instructions / Recommendations: dietary counseling recommended, recommended a daily protein intake of  grams, vitamin supplement(s) recommended, recommended exercising at least 150 minutes per week, behavior modifications recommended and instructed to call the office for concerns, questions, or problems.     The patient was instructed to follow up in 6 months .     Total time spent during this encounter today was  35 minutes

## 2025-02-04 ENCOUNTER — OFFICE VISIT (OUTPATIENT)
Dept: FAMILY MEDICINE CLINIC | Facility: CLINIC | Age: 50
End: 2025-02-04
Payer: COMMERCIAL

## 2025-02-04 VITALS
SYSTOLIC BLOOD PRESSURE: 132 MMHG | OXYGEN SATURATION: 98 % | BODY MASS INDEX: 38.62 KG/M2 | HEART RATE: 82 BPM | TEMPERATURE: 97.5 F | HEIGHT: 65 IN | WEIGHT: 231.8 LBS | RESPIRATION RATE: 18 BRPM | DIASTOLIC BLOOD PRESSURE: 88 MMHG

## 2025-02-04 DIAGNOSIS — R73.9 HYPERGLYCEMIA: ICD-10-CM

## 2025-02-04 DIAGNOSIS — I10 BENIGN ESSENTIAL HYPERTENSION: Primary | ICD-10-CM

## 2025-02-04 PROBLEM — S39.012A LUMBAR STRAIN, INITIAL ENCOUNTER: Status: ACTIVE | Noted: 2025-02-04

## 2025-02-04 PROCEDURE — 99214 OFFICE O/P EST MOD 30 MIN: CPT | Performed by: FAMILY MEDICINE

## 2025-02-04 RX ORDER — AMLODIPINE BESYLATE 5 MG/1
5 TABLET ORAL DAILY
Qty: 90 TABLET | Refills: 1 | Status: SHIPPED | OUTPATIENT
Start: 2025-02-04

## 2025-02-04 RX ORDER — MELOXICAM 15 MG/1
15 TABLET ORAL DAILY
Qty: 30 TABLET | Refills: 1 | Status: SHIPPED | OUTPATIENT
Start: 2025-02-04

## 2025-02-04 RX ORDER — SPIRONOLACTONE AND HYDROCHLOROTHIAZIDE 25; 25 MG/1; MG/1
1 TABLET ORAL DAILY
Qty: 90 TABLET | Refills: 3 | Status: SHIPPED | OUTPATIENT
Start: 2025-02-04

## 2025-02-04 NOTE — PROGRESS NOTES
Chief Complaint   Patient presents with    Back Pain     Low back     Joint Pain    Hypertension    Obesity       Subjective   Shruthi Dockery is a 49 y.o. female.     Back Pain  Pertinent negatives include no abdominal pain, chest pain, dysuria, fever, numbness or weakness.   Joint Pain  Symptoms include myalgias.    Pertinent negative symptoms include no abdominal pain, no chest pain, no chills, no congestion, no cough, no diaphoresis, no fatigue, no fever, no nausea, no neck pain, no numbness, no rash, no sore throat, no swollen glands, no dysuria, no vomiting and no weakness.   Hypertension  Pertinent negatives include no chest pain or neck pain.   Obesity  Symptoms include myalgias.    Pertinent negative symptoms include no abdominal pain, no chest pain, no chills, no congestion, no cough, no diaphoresis, no fatigue, no fever, no nausea, no neck pain, no numbness, no rash, no sore throat, no swollen glands, no dysuria, no vomiting and no weakness.      F/U HTN.  Doing well with meds.    C/o R sided back pain.  Started yesterday.  Hurts if turning over in bed.  Tearing out a nook in house now.  No radiation of pain.    F/U hyperglycemia.  Down 12 lbs since 10/24.    The following portions of the patient's history were reviewed and updated as appropriate: allergies, current medications, past family history, past medical history, past social history, past surgical history and problem list.    Review of Systems   Constitutional:  Negative for chills, diaphoresis, fatigue and fever.   HENT:  Negative for congestion, sore throat and swollen glands.    Respiratory:  Negative for cough.    Cardiovascular:  Negative for chest pain.   Gastrointestinal:  Negative for abdominal pain, nausea and vomiting.   Genitourinary:  Negative for dysuria.   Musculoskeletal:  Positive for arthralgias, back pain and myalgias. Negative for neck pain.   Skin:  Negative for rash.   Neurological:  Negative for weakness and numbness.        Patient Active Problem List   Diagnosis    Chronic fatigue    Edema    TERRI (obstructive sleep apnea)    Class 3 severe obesity with serious comorbidity and body mass index (BMI) of 40.0 to 44.9 in adult    Allergic rhinitis    Anemia    Benign essential hypertension    BPPV (benign paroxysmal positional vertigo)    Hyperglycemia    Palpitations    S/P laparoscopic sleeve gastrectomy    Primary osteoarthritis involving multiple joints    Malabsorption due to intolerance, not elsewhere classified    Primary insomnia    Macromastia    Cervical strain    Thyroid mass    Swelling of thyroid gland    Hypocalcemia    Strain of left trapezius muscle    Lateral epicondylitis of left elbow    Tenosynovitis, de Quervain    Lumbar strain, initial encounter       Allergies   Allergen Reactions    Lisinopril-Hydrochlorothiazide Cough    Penicillins Hives    Skelaxin [Metaxalone] Hives    Sulfa Antibiotics Hives         Current Outpatient Medications:     amLODIPine (NORVASC) 5 MG tablet, Take 1 tablet by mouth Daily., Disp: 90 tablet, Rfl: 1    fluticasone (FLONASE) 50 MCG/ACT nasal spray, SPRAY TWICE IN EACH NOSTRIL TOWARDS THE OUTSIDE OF THE NOSE ONCE DAILY AND DON'T INHALE, Disp: , Rfl:     levocetirizine (XYZAL) 5 MG tablet, , Disp: , Rfl:     multivitamin (THERAGRAN) tablet tablet, Daily., Disp: , Rfl:     pantoprazole (PROTONIX) 40 MG EC tablet, TAKE ONE ORALLY DAILY 30 MINUTES TO 2 HOURS BEFORE DINNER, Disp: , Rfl:     spironolactone-hydrochlorothiazide (ALDACTAZIDE) 25-25 MG tablet, Take 1 tablet by mouth Daily., Disp: 90 tablet, Rfl: 3    Wegovy 2.4 MG/0.75ML solution auto-injector, Inject 2.4 mg under the skin into the appropriate area as directed 1 (One) Time Per Week., Disp: 9 mL, Rfl: 0    meloxicam (MOBIC) 15 MG tablet, Take 1 tablet by mouth Daily., Disp: 30 tablet, Rfl: 1    Past Medical History:   Diagnosis Date    Abnormal uterine bleeding     Acute foot pain, left     Allergic rhinitis     Anemia      Ankle joint pain     Bacterial vaginosis     Benign essential hypertension     BMI 50.0-59.9, adult     BPPV (benign paroxysmal positional vertigo)     Daytime hypersomnolence     Edema     Hemorrhoids     History of blood transfusion     History of chickenpox     Hx of bariatric surgery     Hyperglycemia     Hypocalcemia     Immunization deficiency 9/28/2022    Irregular menstrual cycle     Irritability and anger     Macromastia     Menorrhagia     Obesity     Obstructive sleep apnea syndrome     Optic nerve swelling     Palpitations     Sleep difficulties     Snoring     Strain of right trapezius muscle 9/28/2022    Swelling     Swelling of both ankles     Trapezius muscle spasm     Weight gain     Wheezing        Past Surgical History:   Procedure Laterality Date    BARIATRIC SURGERY      BUNIONECTOMY Left 11/30/2021    left foot    GASTRIC SLEEVE LAPAROSCOPIC N/A 04/18/2018    Procedure: GASTRIC SLEEVE LAPAROSCOPIC;  Surgeon: Christ Sunshine Jr., MD;  [place] [facility] [place] [facility] [place]  Service: Bariatric    TUBAL ABDOMINAL LIGATION      VAGINAL HYSTERECTOMY         Family History   Problem Relation Age of Onset    Obesity Mother     Hypertension Mother     Stroke Mother     Coronary artery disease Mother     Sleep apnea Mother     Hypertension Sister     Hypertension Maternal Grandfather     Malig Hyperthermia Neg Hx        Social History     Tobacco Use    Smoking status: Never     Passive exposure: Never    Smokeless tobacco: Never   Substance Use Topics    Alcohol use: No            Objective     Vitals:    02/04/25 1001   BP: 132/88   Pulse:    Resp:    Temp:    SpO2:      Body mass index is 38.52 kg/m².    Physical Exam  Vitals reviewed.   Constitutional:       Appearance: She is well-developed. She is not diaphoretic.   HENT:      Head: Normocephalic and atraumatic.   Eyes:      General: No scleral icterus.     Pupils: Pupils are equal, round, and reactive to light.   Neck:      Thyroid: No thyromegaly.    Cardiovascular:      Rate and Rhythm: Normal rate and regular rhythm.      Heart sounds: No murmur heard.     No friction rub. No gallop.   Pulmonary:      Effort: Pulmonary effort is normal. No respiratory distress.      Breath sounds: No wheezing or rales.   Chest:      Chest wall: No tenderness.   Abdominal:      General: Bowel sounds are normal. There is no distension.      Palpations: Abdomen is soft.      Tenderness: There is no abdominal tenderness.   Musculoskeletal:         General: No deformity. Normal range of motion.      Comments: TTP R lumbosacral paraspinals.     Lymphadenopathy:      Cervical: No cervical adenopathy.   Skin:     General: Skin is warm and dry.      Findings: No rash.   Neurological:      Cranial Nerves: No cranial nerve deficit.      Motor: No abnormal muscle tone.         Lab Results   Component Value Date    GLUCOSE 89 08/08/2024    BUN 14 08/08/2024    CREATININE 1.31 (H) 08/08/2024    EGFRIFNONA 68 01/26/2022    EGFRIFAFRI 79 01/26/2022    BCR 11 08/08/2024    K 4.6 08/08/2024    CO2 26 08/08/2024    CALCIUM 9.6 08/08/2024    PROTENTOTREF 6.5 08/08/2024    ALBUMIN 4.1 08/08/2024    LABIL2 2.3 03/28/2023    AST 12 08/08/2024    ALT 12 08/08/2024       WBC   Date Value Ref Range Status   02/28/2024 8.41 4.5 - 11.0 10*3/uL Final     RBC   Date Value Ref Range Status   02/28/2024 4.45 4.0 - 5.2 10*6/uL Final     Hemoglobin   Date Value Ref Range Status   02/28/2024 12.4 12.0 - 16.0 g/dL Final     Hematocrit   Date Value Ref Range Status   02/28/2024 37.5 36.0 - 46.0 % Final     MCV   Date Value Ref Range Status   02/28/2024 84.3 80.0 - 100.0 fL Final     MCH   Date Value Ref Range Status   02/28/2024 27.9 26.0 - 34.0 pg Final     MCHC   Date Value Ref Range Status   02/28/2024 33.1 31.0 - 37.0 g/dL Final     RDW   Date Value Ref Range Status   02/28/2024 14.0 12.0 - 16.8 % Final     RDW-SD   Date Value Ref Range Status   07/12/2018 47.3 37.0 - 54.0 fl Final     MPV   Date Value Ref  Range Status   02/28/2024 9.9 8.4 - 12.4 fL Final     Platelets   Date Value Ref Range Status   02/28/2024 422 140 - 440 10*3/uL Final     Neutrophil Rel %   Date Value Ref Range Status   02/28/2024 47.9 45 - 80 % Final     Lymphocyte Rel %   Date Value Ref Range Status   02/28/2024 41.9 15 - 50 % Final     Monocyte Rel %   Date Value Ref Range Status   02/28/2024 7.0 0 - 15 % Final     Eosinophil %   Date Value Ref Range Status   02/28/2024 1.9 0 - 7 % Final     Basophil Rel %   Date Value Ref Range Status   02/28/2024 0.8 0 - 2 % Final     Immature Grans %   Date Value Ref Range Status   02/28/2024 0.5 0.0 - 1.0 % Final     Neutrophils Absolute   Date Value Ref Range Status   02/28/2024 4.03 2.0 - 8.8 10*3/uL Final     Lymphocytes Absolute   Date Value Ref Range Status   02/28/2024 3.52 0.7 - 5.5 10*3/uL Final     Monocytes Absolute   Date Value Ref Range Status   02/28/2024 0.59 0.0 - 1.7 10*3/uL Final     Eosinophils Absolute   Date Value Ref Range Status   02/28/2024 0.16 0.0 - 0.8 10*3/uL Final     Basophils Absolute   Date Value Ref Range Status   02/28/2024 0.07 0.0 - 0.2 10*3/uL Final     Immature Grans, Absolute   Date Value Ref Range Status   02/28/2024 0.04 0.00 - 0.10 10*3/uL Final     nRBC   Date Value Ref Range Status   02/28/2024 0 0 /100(WBC) Final       Lab Results   Component Value Date    HGBA1C 6.0 (H) 08/08/2024       Lab Results   Component Value Date    IEMREDWB76 822 03/28/2023       TSH   Date Value Ref Range Status   03/28/2023 0.555 0.270 - 4.200 uIU/mL Final   12/19/2017 0.915 0.270 - 4.200 mIU/mL Final       Lab Results   Component Value Date    CHOL 150 12/19/2017     Lab Results   Component Value Date    TRIG 86 08/08/2024     Lab Results   Component Value Date    HDL 43 08/08/2024     Lab Results   Component Value Date     (H) 08/08/2024     Lab Results   Component Value Date    VLDL 16 08/08/2024     Lab Results   Component Value Date    LDLHDL 2.63 12/19/2017          Procedures    Assessment & Plan   Problems Addressed this Visit       Benign essential hypertension - Primary    Relevant Medications    spironolactone-hydrochlorothiazide (ALDACTAZIDE) 25-25 MG tablet    amLODIPine (NORVASC) 5 MG tablet    Other Relevant Orders    Comprehensive Metabolic Panel    Hyperglycemia    Relevant Orders    Hemoglobin A1c     Diagnoses         Codes Comments    Benign essential hypertension    -  Primary ICD-10-CM: I10  ICD-9-CM: 401.1     Hyperglycemia     ICD-10-CM: R73.9  ICD-9-CM: 790.29           HTN.  Controlled.  Continue amlo/aldactazide.  RX sent.  Check CMP.    Hyperglycemia.  Check A1c.    Lumbar strain.  Heat TID.  Meloxicam 15 a day prn pain.      Orders Placed This Encounter   Procedures    Comprehensive Metabolic Panel     Order Specific Question:   Release to patient     Answer:   Routine Release [[id number]]    Hemoglobin A1c     Order Specific Question:   Release to patient     Answer:   Routine Release [[id number]]       Current Outpatient Medications   Medication Sig Dispense Refill    amLODIPine (NORVASC) 5 MG tablet Take 1 tablet by mouth Daily. 90 tablet 1    fluticasone (FLONASE) 50 MCG/ACT nasal spray SPRAY TWICE IN EACH NOSTRIL TOWARDS THE OUTSIDE OF THE NOSE ONCE DAILY AND DON'T INHALE      levocetirizine (XYZAL) 5 MG tablet       multivitamin (THERAGRAN) tablet tablet Daily.      pantoprazole (PROTONIX) 40 MG EC tablet TAKE ONE ORALLY DAILY 30 MINUTES TO 2 HOURS BEFORE DINNER      spironolactone-hydrochlorothiazide (ALDACTAZIDE) 25-25 MG tablet Take 1 tablet by mouth Daily. 90 tablet 3    Wegovy 2.4 MG/0.75ML solution auto-injector Inject 2.4 mg under the skin into the appropriate area as directed 1 (One) Time Per Week. 9 mL 0    meloxicam (MOBIC) 15 MG tablet Take 1 tablet by mouth Daily. 30 tablet 1     No current facility-administered medications for this visit.       Shruthi Dockery had no medications administered during this visit.    Return in  about 6 months (around 8/7/2025) for Annual physical.    There are no Patient Instructions on file for this visit.

## 2025-02-05 LAB
ALBUMIN SERPL-MCNC: 4.1 G/DL (ref 3.5–5.2)
ALBUMIN/GLOB SERPL: 1.3 G/DL
ALP SERPL-CCNC: 80 U/L (ref 39–117)
ALT SERPL-CCNC: 11 U/L (ref 1–33)
AST SERPL-CCNC: 14 U/L (ref 1–32)
BILIRUB SERPL-MCNC: 0.4 MG/DL (ref 0–1.2)
BUN SERPL-MCNC: 16 MG/DL (ref 6–20)
BUN/CREAT SERPL: 12.4 (ref 7–25)
CALCIUM SERPL-MCNC: 9.3 MG/DL (ref 8.6–10.5)
CHLORIDE SERPL-SCNC: 103 MMOL/L (ref 98–107)
CO2 SERPL-SCNC: 29.6 MMOL/L (ref 22–29)
CREAT SERPL-MCNC: 1.29 MG/DL (ref 0.57–1)
EGFRCR SERPLBLD CKD-EPI 2021: 50.4 ML/MIN/1.73
GLOBULIN SER CALC-MCNC: 3.1 GM/DL
GLUCOSE SERPL-MCNC: 83 MG/DL (ref 65–99)
HBA1C MFR BLD: 5.6 % (ref 4.8–5.6)
POTASSIUM SERPL-SCNC: 4 MMOL/L (ref 3.5–5.2)
PROT SERPL-MCNC: 7.2 G/DL (ref 6–8.5)
SODIUM SERPL-SCNC: 142 MMOL/L (ref 136–145)

## 2025-02-25 RX ORDER — SEMAGLUTIDE 2.4 MG/.75ML
INJECTION, SOLUTION SUBCUTANEOUS
Qty: 9 ML | Refills: 0 | Status: SHIPPED | OUTPATIENT
Start: 2025-02-25

## 2025-07-02 ENCOUNTER — TELEPHONE (OUTPATIENT)
Dept: FAMILY MEDICINE CLINIC | Facility: CLINIC | Age: 50
End: 2025-07-02
Payer: COMMERCIAL

## 2025-07-07 DIAGNOSIS — I10 BENIGN ESSENTIAL HYPERTENSION: ICD-10-CM

## 2025-07-07 RX ORDER — AMLODIPINE BESYLATE 5 MG/1
5 TABLET ORAL DAILY
Qty: 90 TABLET | Refills: 1 | Status: SHIPPED | OUTPATIENT
Start: 2025-07-07

## 2025-07-07 NOTE — TELEPHONE ENCOUNTER
Rx Refill Note  Requested Prescriptions     Pending Prescriptions Disp Refills    amLODIPine (NORVASC) 5 MG tablet 90 tablet 1     Sig: Take 1 tablet by mouth Daily.      Last office visit with prescribing clinician: 2/4/2025   Last telemedicine visit with prescribing clinician: Visit date not found   Next office visit with prescribing clinician: 8/11/2025                         Would you like a call back once the refill request has been completed: [] Yes [] No    If the office needs to give you a call back, can they leave a voicemail: [] Yes [] No    Monica Ramirez MA  07/07/25, 10:03 EDT

## 2025-07-23 ENCOUNTER — TELEPHONE (OUTPATIENT)
Dept: FAMILY MEDICINE CLINIC | Facility: CLINIC | Age: 50
End: 2025-07-23
Payer: COMMERCIAL

## 2025-07-23 NOTE — TELEPHONE ENCOUNTER
Called and notified patient, voiced understanding. Also voice concerns of graves disease as ENT had mentioned this to her

## 2025-07-23 NOTE — TELEPHONE ENCOUNTER
Caller: Shruthi Dockery    Relationship: Self    Best call back number: [phone]    What is the medical concern/diagnosis:   THYROIDS-MENTIONED GRAVES DISEASE  What specialty or service is being requested: ENDOCRINOLOGIST    What is the provider, practice or medical service name: DR.MARY GUANAKO LYNN WITH [facility] ENDOCRINOLOGY.     What is the office location: [address] [place] [address]    What is the office phone number: PHONE- [phone]  [phone]  Any additional details:   PLEASE ADVISE.

## 2025-08-11 ENCOUNTER — OFFICE VISIT (OUTPATIENT)
Dept: FAMILY MEDICINE CLINIC | Facility: CLINIC | Age: 50
End: 2025-08-11
Payer: COMMERCIAL

## 2025-08-11 VITALS
BODY MASS INDEX: 39.52 KG/M2 | OXYGEN SATURATION: 98 % | HEIGHT: 65 IN | HEART RATE: 95 BPM | WEIGHT: 237.2 LBS | SYSTOLIC BLOOD PRESSURE: 132 MMHG | DIASTOLIC BLOOD PRESSURE: 90 MMHG | TEMPERATURE: 97.9 F

## 2025-08-11 DIAGNOSIS — E87.6 HYPOKALEMIA: ICD-10-CM

## 2025-08-11 DIAGNOSIS — I10 PRIMARY HYPERTENSION: ICD-10-CM

## 2025-08-11 DIAGNOSIS — Z00.00 ENCOUNTER FOR ANNUAL HEALTH EXAMINATION: Primary | ICD-10-CM

## 2025-08-11 PROCEDURE — 90471 IMMUNIZATION ADMIN: CPT | Performed by: FAMILY MEDICINE

## 2025-08-11 PROCEDURE — 90684 PCV21 VACCINE IM: CPT | Performed by: FAMILY MEDICINE

## 2025-08-11 PROCEDURE — 99214 OFFICE O/P EST MOD 30 MIN: CPT | Performed by: FAMILY MEDICINE

## 2025-08-11 PROCEDURE — 99396 PREV VISIT EST AGE 40-64: CPT | Performed by: FAMILY MEDICINE

## 2025-08-11 RX ORDER — SPIRONOLACTONE 50 MG/1
50 TABLET, FILM COATED ORAL DAILY
Qty: 90 TABLET | Refills: 3 | Status: SHIPPED | OUTPATIENT
Start: 2025-08-11

## 2025-08-12 DIAGNOSIS — R79.89 DECREASED THYROID STIMULATING HORMONE (TSH) LEVEL: Primary | ICD-10-CM

## 2025-08-12 LAB
CHOLEST SERPL-MCNC: 165 MG/DL (ref 100–199)
CHOLEST/HDLC SERPL: 3.8 RATIO (ref 0–4.4)
HDLC SERPL-MCNC: 43 MG/DL
LDLC SERPL CALC-MCNC: 109 MG/DL (ref 0–99)
T4 FREE SERPL-MCNC: 1.18 NG/DL (ref 0.82–1.77)
TRIGL SERPL-MCNC: 66 MG/DL (ref 0–149)
TSH SERPL DL<=0.005 MIU/L-ACNC: 0.2 UIU/ML (ref 0.45–4.5)
VLDLC SERPL CALC-MCNC: 13 MG/DL (ref 5–40)

## 2025-08-21 ENCOUNTER — OFFICE VISIT (OUTPATIENT)
Dept: BARIATRICS/WEIGHT MGMT | Facility: CLINIC | Age: 50
End: 2025-08-21
Payer: COMMERCIAL

## 2025-08-21 VITALS
HEIGHT: 65 IN | BODY MASS INDEX: 39.49 KG/M2 | DIASTOLIC BLOOD PRESSURE: 70 MMHG | HEART RATE: 76 BPM | TEMPERATURE: 97.5 F | SYSTOLIC BLOOD PRESSURE: 121 MMHG | WEIGHT: 237 LBS

## 2025-08-21 DIAGNOSIS — E66.812 OBESITY, CLASS II, BMI 35-39.9: Primary | ICD-10-CM

## 2025-08-21 DIAGNOSIS — R53.82 CHRONIC FATIGUE: ICD-10-CM

## 2025-08-21 DIAGNOSIS — G47.33 OSA (OBSTRUCTIVE SLEEP APNEA): ICD-10-CM

## 2025-08-21 DIAGNOSIS — Z98.84 S/P LAPAROSCOPIC SLEEVE GASTRECTOMY: ICD-10-CM

## 2025-08-21 DIAGNOSIS — K91.2 POSTOPERATIVE MALABSORPTION: ICD-10-CM

## 2025-08-21 PROBLEM — E83.51 HYPOCALCEMIA: Status: RESOLVED | Noted: 2023-08-07 | Resolved: 2025-08-21

## 2025-08-21 PROBLEM — Z00.00 ENCOUNTER FOR ANNUAL HEALTH EXAMINATION: Status: RESOLVED | Noted: 2020-08-26 | Resolved: 2025-08-21

## 2025-08-21 PROBLEM — E66.813 CLASS 3 SEVERE OBESITY WITH SERIOUS COMORBIDITY AND BODY MASS INDEX (BMI) OF 40.0 TO 44.9 IN ADULT: Status: RESOLVED | Noted: 2018-04-18 | Resolved: 2025-08-21

## 2025-08-21 PROCEDURE — 99214 OFFICE O/P EST MOD 30 MIN: CPT | Performed by: NURSE PRACTITIONER

## 2025-08-21 RX ORDER — SEMAGLUTIDE 2.4 MG/.75ML
2.4 INJECTION, SOLUTION SUBCUTANEOUS
COMMUNITY
Start: 2025-07-27 | End: 2025-08-21

## 2025-08-21 RX ORDER — SPIRONOLACTONE AND HYDROCHLOROTHIAZIDE 25; 25 MG/1; MG/1
1 TABLET ORAL DAILY
COMMUNITY
Start: 2025-08-10 | End: 2025-08-21

## 2025-08-21 RX ORDER — ALBUTEROL SULFATE 90 UG/1
2 INHALANT RESPIRATORY (INHALATION)
COMMUNITY
Start: 2025-08-11

## 2025-08-25 ENCOUNTER — LAB (OUTPATIENT)
Dept: LAB | Facility: HOSPITAL | Age: 50
End: 2025-08-25
Payer: COMMERCIAL

## 2025-08-25 DIAGNOSIS — R53.82 CHRONIC FATIGUE: ICD-10-CM

## 2025-08-25 DIAGNOSIS — E66.812 OBESITY, CLASS II, BMI 35-39.9: ICD-10-CM

## 2025-08-25 DIAGNOSIS — G47.33 OSA (OBSTRUCTIVE SLEEP APNEA): ICD-10-CM

## 2025-08-25 DIAGNOSIS — Z98.84 S/P LAPAROSCOPIC SLEEVE GASTRECTOMY: ICD-10-CM

## 2025-08-25 DIAGNOSIS — K91.2 POSTOPERATIVE MALABSORPTION: ICD-10-CM

## 2025-08-25 LAB
25(OH)D3 SERPL-MCNC: 37 NG/ML (ref 30–100)
FERRITIN SERPL-MCNC: 226 NG/ML (ref 13–150)
FOLATE SERPL-MCNC: 19.8 NG/ML (ref 4.78–24.2)
IRON 24H UR-MRATE: 48 MCG/DL (ref 37–145)
PREALB SERPL-MCNC: 22.8 MG/DL (ref 20–40)

## 2025-08-25 PROCEDURE — 84134 ASSAY OF PREALBUMIN: CPT

## 2025-08-25 PROCEDURE — 36415 COLL VENOUS BLD VENIPUNCTURE: CPT

## 2025-08-25 PROCEDURE — 83540 ASSAY OF IRON: CPT

## 2025-08-25 PROCEDURE — 84425 ASSAY OF VITAMIN B-1: CPT

## 2025-08-25 PROCEDURE — 82306 VITAMIN D 25 HYDROXY: CPT

## 2025-08-25 PROCEDURE — 83921 ORGANIC ACID SINGLE QUANT: CPT

## 2025-08-25 PROCEDURE — 82728 ASSAY OF FERRITIN: CPT

## 2025-08-25 PROCEDURE — 82746 ASSAY OF FOLIC ACID SERUM: CPT

## 2025-08-26 ENCOUNTER — TELEPHONE (OUTPATIENT)
Dept: BARIATRICS/WEIGHT MGMT | Facility: CLINIC | Age: 50
End: 2025-08-26
Payer: COMMERCIAL

## 2025-08-29 LAB — METHYLMALONATE SERPL-SCNC: 104 NMOL/L (ref 0–378)

## 2025-08-30 LAB — VIT B1 BLD-SCNC: 106.2 NMOL/L (ref 66.5–200)

## (undated) DEVICE — GLV SURG SENSICARE MICRO PF LF 6 STRL

## (undated) DEVICE — VISIGI 3D®  CALIBRATION SYSTEM  SIZE 36FR STD W/ BULB: Brand: BOEHRINGER® VISIGI 3D™ SLEEVE GASTRECTOMY CALIBRATION SYSTEM, SIZE 36FR W/BULB

## (undated) DEVICE — PK OSC LAP SLV 40

## (undated) DEVICE — ENSEAL LAPAROSCOPIC TISSUE SEALER G2 ARTICULATING CURVED JAW FOR USE WITH G2 GENERATOR 5MM DIAMETER 45CM SHAFT LENGTH: Brand: ENSEAL

## (undated) DEVICE — GLV SURG SENSICARE GREEN W/ALOE PF LF 6 STRL

## (undated) DEVICE — GLV SURG SENSICARE MICRO PF LF 8.5 STRL

## (undated) DEVICE — GLV SURG SENSICARE GREEN W/ALOE PF LF 8.5 STRL

## (undated) DEVICE — APL DUPLOSPRAYER MIS 40CM